# Patient Record
Sex: MALE | Race: WHITE | HISPANIC OR LATINO | Employment: OTHER | URBAN - METROPOLITAN AREA
[De-identification: names, ages, dates, MRNs, and addresses within clinical notes are randomized per-mention and may not be internally consistent; named-entity substitution may affect disease eponyms.]

---

## 2019-02-05 ENCOUNTER — OFFICE VISIT (OUTPATIENT)
Dept: URGENT CARE | Facility: CLINIC | Age: 69
End: 2019-02-05
Payer: MEDICARE

## 2019-02-05 VITALS
TEMPERATURE: 98 F | BODY MASS INDEX: 27.7 KG/M2 | HEIGHT: 69 IN | OXYGEN SATURATION: 95 % | DIASTOLIC BLOOD PRESSURE: 76 MMHG | SYSTOLIC BLOOD PRESSURE: 147 MMHG | HEART RATE: 68 BPM | RESPIRATION RATE: 18 BRPM | WEIGHT: 187 LBS

## 2019-02-05 DIAGNOSIS — J00 ACUTE NASOPHARYNGITIS: Primary | ICD-10-CM

## 2019-02-05 PROCEDURE — 99203 OFFICE O/P NEW LOW 30 MIN: CPT | Mod: S$GLB,,, | Performed by: NURSE PRACTITIONER

## 2019-02-05 PROCEDURE — 99203 PR OFFICE/OUTPT VISIT, NEW, LEVL III, 30-44 MIN: ICD-10-PCS | Mod: S$GLB,,, | Performed by: NURSE PRACTITIONER

## 2019-02-05 RX ORDER — LISINOPRIL 5 MG/1
5 TABLET ORAL DAILY
Status: ON HOLD | COMMUNITY
End: 2020-02-17

## 2019-02-05 RX ORDER — LEVOCETIRIZINE DIHYDROCHLORIDE 5 MG/1
5 TABLET, FILM COATED ORAL NIGHTLY
Qty: 10 TABLET | Refills: 0 | Status: ON HOLD | OUTPATIENT
Start: 2019-02-05 | End: 2020-02-17

## 2019-02-05 RX ORDER — METHYLPREDNISOLONE 4 MG/1
TABLET ORAL
Qty: 21 TABLET | Refills: 0 | Status: ON HOLD | OUTPATIENT
Start: 2019-02-05 | End: 2020-02-17

## 2019-02-05 RX ORDER — METOPROLOL SUCCINATE 50 MG/1
50 TABLET, EXTENDED RELEASE ORAL DAILY
COMMUNITY

## 2019-02-05 NOTE — PROGRESS NOTES
"Subjective:       Patient ID: Antonio Gross is a 68 y.o. male.    Vitals:  height is 5' 9" (1.753 m) and weight is 84.8 kg (187 lb). His temperature is 98 °F (36.7 °C). His blood pressure is 147/76 (abnormal) and his pulse is 68. His respiration is 18 and oxygen saturation is 95%.     Chief Complaint: Cough    Pt is here for a cough. He states that it started 7 days ago. He was in really cold weather in [place], and now is here where it is warm. He states that he has a hx of afib, he wants to rule out cough being caused to afib, or fluid in lungs. He denies any chest pain or palpitations. He solely complains of the cough. He has been taking his pulse and BP regularly and everything was normal. Accumulation of phlegm in the back of throat at nighttime. Pt took robitussin day and night medication for the past 3 days.       Cough   This is a new problem. The current episode started in the past 7 days. The problem has been unchanged. The cough is productive of sputum. Associated symptoms include postnasal drip. Pertinent negatives include no chills, ear congestion, ear pain, eye redness, fever, hemoptysis, myalgias, nasal congestion, rash, sore throat, shortness of breath or wheezing. Nothing aggravates the symptoms. He has tried OTC cough suppressant for the symptoms. There is no history of bronchitis, COPD or pneumonia.       Constitution: Negative for chills, sweating, fatigue and fever.   HENT: Positive for postnasal drip. Negative for ear pain, congestion, sinus pain, sinus pressure, sore throat and voice change.    Neck: Negative for painful lymph nodes.   Eyes: Negative for eye redness.   Respiratory: Positive for cough. Negative for chest tightness, sputum production, bloody sputum, COPD, shortness of breath, stridor, wheezing and asthma.    Gastrointestinal: Negative for nausea and vomiting.   Musculoskeletal: Negative for muscle ache.   Skin: Negative for rash.   Allergic/Immunologic: Negative " for seasonal allergies and asthma.   Hematologic/Lymphatic: Negative for swollen lymph nodes.       Objective:      Physical Exam   Constitutional: He is oriented to person, place, and time. He appears well-developed and well-nourished. He is cooperative.  Non-toxic appearance. He does not appear ill. No distress.   HENT:   Head: Normocephalic and atraumatic.   Right Ear: Hearing, tympanic membrane, external ear and ear canal normal.   Left Ear: Hearing, tympanic membrane, external ear and ear canal normal.   Nose: Rhinorrhea present. No mucosal edema or nasal deformity. No epistaxis. Right sinus exhibits no maxillary sinus tenderness and no frontal sinus tenderness. Left sinus exhibits no maxillary sinus tenderness and no frontal sinus tenderness.   Mouth/Throat: Uvula is midline, oropharynx is clear and moist and mucous membranes are normal. No trismus in the jaw. Normal dentition. No uvula swelling. No posterior oropharyngeal erythema.   PND   Eyes: Conjunctivae and lids are normal. No scleral icterus.   Sclera clear bilat   Neck: Trachea normal, full passive range of motion without pain and phonation normal. Neck supple.   Cardiovascular: Normal rate, regular rhythm, normal heart sounds, intact distal pulses and normal pulses.   Pulmonary/Chest: Effort normal and breath sounds normal. No stridor. No respiratory distress. He has no decreased breath sounds. He has no wheezes. He has no rhonchi. He has no rales.   Abdominal: Soft. Normal appearance and bowel sounds are normal. He exhibits no distension. There is no tenderness.   Musculoskeletal: Normal range of motion. He exhibits no edema or deformity.   Neurological: He is alert and oriented to person, place, and time. He exhibits normal muscle tone. Coordination normal.   Skin: Skin is warm, dry and intact. He is not diaphoretic. No pallor.   Psychiatric: He has a normal mood and affect. His speech is normal and behavior is normal. Judgment and thought content  normal. Cognition and memory are normal.   Nursing note and vitals reviewed.      Assessment:       1. Acute nasopharyngitis        Plan:         Acute nasopharyngitis    Other orders  -     methylPREDNISolone (MEDROL DOSEPACK) 4 mg tablet; use as directed  Dispense: 21 tablet; Refill: 0  -     levocetirizine (XYZAL) 5 MG tablet; Take 1 tablet (5 mg total) by mouth every evening. for 10 days  Dispense: 10 tablet; Refill: 0        Enfermedad Respiratoria Viral [Uri, Viral Respiratory Illness, Adult, No Abx]  Usted tiene madison enfermedad respiratoria de las vías superiores provocada por un virus. Esta enfermedad es contagiosa rohan los primeros días. Se transmite por el aire, por la tos o el estornudo de la persona afectada, o por contacto directo con zahra persona (si raymond toca a la persona enferma y después se toca los ojos, la nariz o la boca). La mayoría de las enfermedades virales mejoran en 7-10 días con reposo y simples fortino caseros; aunque, en ocasiones, la enfermedad puede durar varias semanas. Los antibióticos son ineficaces contra los virus, por lo que generalmente no se recetan para esta enfermedad.    Cuidados En [place]:  1) Si los síntomas son severos, descanse en guerrero casa rohan los primeros 2 ó 3 días. Cuando reanude derick actividades, evite cansarse demasiado.  2) Evite exponerse al humo de cigarrillo (suyo o de otras personas).  3) Puede usar Tylenol (acetaminofén) o ibuprofeno (Motrin o Advil) para controlar la fiebre o el dolor muscular y el dolor de guillaume. (La aspirina no debe usarse nunca en personas menores de 18 años enfermas con fiebre, ya que puede causar daños graves al hígado.)  4) Es posible que tenga poco apetito, por lo que madison dieta ligera es adecuada. Para evitar la deshidratación, asael entre 6 y 8 vasos de líquido cada día (agua, refrescos, jugos de fruta, té, sopa etc.). La abundancia de líquido ayuda a desprender las secreciones de la nariz y los pulmones.  5) Los medicamentos sin  receta para el resfriado no acortarán la duración de la enfermedad, eddi pueden ser útiles para aliviar los siguientes síntomas: tos (Robitussin MD); dolor de garganta (Chloraseptic en comprimidos o spray); congestión nasal y de los senos paranasales (Actifed, Sudafed o Chlortrimeton).  Dane madison VISITA DE CONTROL a guerrero médico, o según le indiquen, si no se siente mejor rohan la semana próxima.  Busque Prontamente Atención Médica  si algo de lo siguiente ocurre:  -- Tos con esputo de color (mucosidad) o con margarito.  -- Dolor en el pecho, falta de aire, silbidos o dificultad para respirar.  -- Dolor de guillaume justine, dolor en la nicole, el jamilah o los oídos.  -- Fiebre superior a los 100.4º F (38.0º C) rohan más de marnie días.  -- Incapacidad de tragar a causa del dolor de garganta.  Date Last Reviewed: 9/13/2015  © [id number] [employer]. [address] [place] [id number]. Todos los derechos reservados. Esta información no pretende sustituir la atención médica profesional. Sólo guerrero médico puede diagnosticar y tratar un problema de carlito.    Please drink plenty of fluids.  Please get plenty of rest.  Please return here or go to the Emergency Department for any concerns or worsening of condition.  If you were given a steroid shot in the clinic and have also been given a prescription for a steroid such as Prednisone or a Medrol Dose Pack, please begin taking them tomorrow.  If you do not have Hypertension or any history of palpitations, it is ok to take over the counter Sudafed or Mucinex D or Allegra-D or Claritin-D or Zyrtec-D.  If you do take one of the above, it is ok to combine that with plain over the counter Mucinex or Allegra or Claritin or Zyrtec.  If for example you are taking Zyrtec -D, you can combine that with Mucinex, but not Mucinex-D.  If you are taking Mucinex-D, you can combine that with plain Allegra or Claritin or Zyrtec.   If you do have Hypertension or palpitations, it is safe  to take Coricidin HBP for relief of sinus symptoms.  If not allergic, please take over the counter Tylenol (Acetaminophen) and/or Motrin (Ibuprofen) as directed for control of pain and/or fever.  Please follow up with your primary care doctor or specialist as needed.    If you  smoke, please stop smoking.

## 2019-02-05 NOTE — PATIENT INSTRUCTIONS
Enfermedad Respiratoria Viral [Uri, Viral Respiratory Illness, Adult, No Abx]  Usted tiene madison enfermedad respiratoria de las vías superiores provocada por un virus. Esta enfermedad es contagiosa rohan los primeros días. Se transmite por el aire, por la tos o el estornudo de la persona afectada, o por contacto directo con zahra persona (si raymond toca a la persona enferma y después se toca los ojos, la nariz o la boca). La mayoría de las enfermedades virales mejoran en 7-10 días con reposo y simples fortino caseros; aunque, en ocasiones, la enfermedad puede durar varias semanas. Los antibióticos son ineficaces contra los virus, por lo que generalmente no se recetan para esta enfermedad.    Cuidados En [place]:  1) Si los síntomas son severos, descanse en guerrero casa rohan los primeros 2 ó 3 días. Cuando reanude derick actividades, evite cansarse demasiado.  2) Evite exponerse al humo de cigarrillo (suyo o de otras personas).  3) Puede usar Tylenol (acetaminofén) o ibuprofeno (Motrin o Advil) para controlar la fiebre o el dolor muscular y el dolor de guillaume. (La aspirina no debe usarse nunca en personas menores de 18 años enfermas con fiebre, ya que puede causar daños graves al hígado.)  4) Es posible que tenga poco apetito, por lo que madison dieta ligera es adecuada. Para evitar la deshidratación, asael entre 6 y 8 vasos de líquido cada día (agua, refrescos, jugos de fruta, té, sopa etc.). La abundancia de líquido ayuda a desprender las secreciones de la nariz y los pulmones.  5) Los medicamentos sin receta para el resfriado no acortarán la duración de la enfermedad, eddi pueden ser útiles para aliviar los siguientes síntomas: tos (Robitussin MD); dolor de garganta (Chloraseptic en comprimidos o spray); congestión nasal y de los senos paranasales (Actifed, Sudafed o Chlortrimeton).  Dane madison VISITA DE CONTROL a guerrero médico, o según le indiquen, si no se siente mejor rohan la semana próxima.  Busque Prontamente Atención  Médica  si algo de lo siguiente ocurre:  -- Tos con esputo de color (mucosidad) o con margarito.  -- Dolor en el pecho, falta de aire, silbidos o dificultad para respirar.  -- Dolor de guillaume justine, dolor en la nicole, el jamilah o los oídos.  -- Fiebre superior a los 100.4º F (38.0º C) rohan más de marnie días.  -- Incapacidad de tragar a causa del dolor de garganta.  Date Last Reviewed: 9/13/2015  © 5448-3217 [employer]. [address] [place] [address], [place] [address]. Todos los derechos reservados. Esta información no pretende sustituir la atención médica profesional. Sólo guerrero médico puede diagnosticar y tratar un problema de carlito.    Please drink plenty of fluids.  Please get plenty of rest.  Please return here or go to the Emergency Department for any concerns or worsening of condition.  If you were given a steroid shot in the clinic and have also been given a prescription for a steroid such as Prednisone or a Medrol Dose Pack, please begin taking them tomorrow.  If you do not have Hypertension or any history of palpitations, it is ok to take over the counter Sudafed or Mucinex D or Allegra-D or Claritin-D or Zyrtec-D.  If you do take one of the above, it is ok to combine that with plain over the counter Mucinex or Allegra or Claritin or Zyrtec.  If for example you are taking Zyrtec -D, you can combine that with Mucinex, but not Mucinex-D.  If you are taking Mucinex-D, you can combine that with plain Allegra or Claritin or Zyrtec.   If you do have Hypertension or palpitations, it is safe to take Coricidin HBP for relief of sinus symptoms.  If not allergic, please take over the counter Tylenol (Acetaminophen) and/or Motrin (Ibuprofen) as directed for control of pain and/or fever.  Please follow up with your primary care doctor or specialist as needed.    If you  smoke, please stop smoking.

## 2020-02-15 ENCOUNTER — ANESTHESIA (OUTPATIENT)
Dept: SURGERY | Facility: HOSPITAL | Age: 70
DRG: 854 | End: 2020-02-15
Payer: MEDICARE

## 2020-02-15 ENCOUNTER — HOSPITAL ENCOUNTER (INPATIENT)
Facility: HOSPITAL | Age: 70
LOS: 7 days | Discharge: HOME HEALTH | DRG: 854 | End: 2020-02-22
Attending: EMERGENCY MEDICINE | Admitting: INTERNAL MEDICINE
Payer: MEDICARE

## 2020-02-15 ENCOUNTER — ANESTHESIA EVENT (OUTPATIENT)
Dept: SURGERY | Facility: HOSPITAL | Age: 70
DRG: 854 | End: 2020-02-15
Payer: MEDICARE

## 2020-02-15 DIAGNOSIS — R78.81 BACTEREMIA: ICD-10-CM

## 2020-02-15 DIAGNOSIS — A41.01 SEPSIS DUE TO METHICILLIN SUSCEPTIBLE STAPHYLOCOCCUS AUREUS, UNSPECIFIED WHETHER ACUTE ORGAN DYSFUNCTION PRESENT: ICD-10-CM

## 2020-02-15 DIAGNOSIS — R93.2 ABNORMAL FINDING ON IMAGING OF LIVER: ICD-10-CM

## 2020-02-15 DIAGNOSIS — R50.9 FEVER: ICD-10-CM

## 2020-02-15 DIAGNOSIS — N20.1 URETERAL STONE: ICD-10-CM

## 2020-02-15 DIAGNOSIS — N20.0 NEPHROLITHIASIS: ICD-10-CM

## 2020-02-15 DIAGNOSIS — B95.8 BACTEREMIA DUE TO STAPHYLOCOCCUS: Primary | ICD-10-CM

## 2020-02-15 DIAGNOSIS — R78.81 BACTEREMIA DUE TO STAPHYLOCOCCUS: Primary | ICD-10-CM

## 2020-02-15 DIAGNOSIS — A41.9 SEPSIS, DUE TO UNSPECIFIED ORGANISM, UNSPECIFIED WHETHER ACUTE ORGAN DYSFUNCTION PRESENT: ICD-10-CM

## 2020-02-15 DIAGNOSIS — N20.1 RIGHT URETERAL STONE: ICD-10-CM

## 2020-02-15 DIAGNOSIS — N12 PYELONEPHRITIS: ICD-10-CM

## 2020-02-15 DIAGNOSIS — R07.9 CHEST PAIN: ICD-10-CM

## 2020-02-15 PROBLEM — Z79.01 CURRENT USE OF LONG TERM ANTICOAGULATION: Status: ACTIVE | Noted: 2020-02-15

## 2020-02-15 PROBLEM — I48.91 ATRIAL FIBRILLATION: Status: ACTIVE | Noted: 2020-02-15

## 2020-02-15 PROBLEM — I10 ESSENTIAL HYPERTENSION: Status: ACTIVE | Noted: 2020-02-15

## 2020-02-15 LAB
ABO GROUP BLD: NORMAL
ALBUMIN SERPL BCP-MCNC: 3.1 G/DL (ref 3.5–5.2)
ALP SERPL-CCNC: 70 U/L (ref 55–135)
ALT SERPL W/O P-5'-P-CCNC: 40 U/L (ref 10–44)
ANION GAP SERPL CALC-SCNC: 9 MMOL/L (ref 8–16)
AST SERPL-CCNC: 33 U/L (ref 10–40)
BACTERIA #/AREA URNS AUTO: ABNORMAL /HPF
BASOPHILS # BLD AUTO: 0.03 K/UL (ref 0–0.2)
BASOPHILS NFR BLD: 0.2 % (ref 0–1.9)
BILIRUB SERPL-MCNC: 2.2 MG/DL (ref 0.1–1)
BILIRUB UR QL STRIP: NEGATIVE
BLD GP AB SCN CELLS X3 SERPL QL: NORMAL
BUN SERPL-MCNC: 18 MG/DL (ref 8–23)
CALCIUM SERPL-MCNC: 9.3 MG/DL (ref 8.7–10.5)
CHLORIDE SERPL-SCNC: 97 MMOL/L (ref 95–110)
CLARITY UR REFRACT.AUTO: ABNORMAL
CO2 SERPL-SCNC: 22 MMOL/L (ref 23–29)
COLOR UR AUTO: ABNORMAL
CREAT SERPL-MCNC: 1.4 MG/DL (ref 0.5–1.4)
DIFFERENTIAL METHOD: ABNORMAL
EOSINOPHIL # BLD AUTO: 0 K/UL (ref 0–0.5)
EOSINOPHIL NFR BLD: 0 % (ref 0–8)
ERYTHROCYTE [DISTWIDTH] IN BLOOD BY AUTOMATED COUNT: 13 % (ref 11.5–14.5)
EST. GFR  (AFRICAN AMERICAN): 58 ML/MIN/1.73 M^2
EST. GFR  (NON AFRICAN AMERICAN): 50.2 ML/MIN/1.73 M^2
GLUCOSE SERPL-MCNC: 115 MG/DL (ref 70–110)
GLUCOSE UR QL STRIP: NEGATIVE
HCT VFR BLD AUTO: 37.6 % (ref 40–54)
HGB BLD-MCNC: 13.1 G/DL (ref 14–18)
HGB UR QL STRIP: ABNORMAL
HYALINE CASTS UR QL AUTO: 0 /LPF
IMM GRANULOCYTES # BLD AUTO: 0.66 K/UL (ref 0–0.04)
IMM GRANULOCYTES NFR BLD AUTO: 3.5 % (ref 0–0.5)
INFLUENZA A, MOLECULAR: NEGATIVE
INFLUENZA B, MOLECULAR: NEGATIVE
KETONES UR QL STRIP: NEGATIVE
LACTATE SERPL-SCNC: 1.1 MMOL/L (ref 0.5–2.2)
LACTATE SERPL-SCNC: 1.7 MMOL/L (ref 0.5–2.2)
LEUKOCYTE ESTERASE UR QL STRIP: ABNORMAL
LYMPHOCYTES # BLD AUTO: 0.5 K/UL (ref 1–4.8)
LYMPHOCYTES NFR BLD: 2.7 % (ref 18–48)
MAGNESIUM SERPL-MCNC: 1.5 MG/DL (ref 1.6–2.6)
MCH RBC QN AUTO: 34.7 PG (ref 27–31)
MCHC RBC AUTO-ENTMCNC: 34.8 G/DL (ref 32–36)
MCV RBC AUTO: 100 FL (ref 82–98)
MICROSCOPIC COMMENT: ABNORMAL
MONOCYTES # BLD AUTO: 1.7 K/UL (ref 0.3–1)
MONOCYTES NFR BLD: 8.8 % (ref 4–15)
NEUTROPHILS # BLD AUTO: 15.9 K/UL (ref 1.8–7.7)
NEUTROPHILS NFR BLD: 84.8 % (ref 38–73)
NITRITE UR QL STRIP: NEGATIVE
NRBC BLD-RTO: 0 /100 WBC
PH UR STRIP: 5 [PH] (ref 5–8)
PHOSPHATE SERPL-MCNC: 2.6 MG/DL (ref 2.7–4.5)
PLATELET # BLD AUTO: 100 K/UL (ref 150–350)
PMV BLD AUTO: 10.3 FL (ref 9.2–12.9)
POTASSIUM SERPL-SCNC: 4.2 MMOL/L (ref 3.5–5.1)
PROCALCITONIN SERPL IA-MCNC: 2.77 NG/ML
PROT SERPL-MCNC: 7.1 G/DL (ref 6–8.4)
PROT UR QL STRIP: ABNORMAL
RBC # BLD AUTO: 3.77 M/UL (ref 4.6–6.2)
RBC #/AREA URNS AUTO: 6 /HPF (ref 0–4)
RH BLD: NORMAL
SODIUM SERPL-SCNC: 128 MMOL/L (ref 136–145)
SP GR UR STRIP: 1.02 (ref 1–1.03)
SPECIMEN SOURCE: NORMAL
URN SPEC COLLECT METH UR: ABNORMAL
WBC # BLD AUTO: 18.76 K/UL (ref 3.9–12.7)
WBC #/AREA URNS AUTO: >100 /HPF (ref 0–5)

## 2020-02-15 PROCEDURE — 87186 SC STD MICRODIL/AGAR DIL: CPT | Mod: 59

## 2020-02-15 PROCEDURE — 25000003 PHARM REV CODE 250: Performed by: STUDENT IN AN ORGANIZED HEALTH CARE EDUCATION/TRAINING PROGRAM

## 2020-02-15 PROCEDURE — D9220A PRA ANESTHESIA: Mod: ANES,,, | Performed by: ANESTHESIOLOGY

## 2020-02-15 PROCEDURE — 87502 INFLUENZA DNA AMP PROBE: CPT

## 2020-02-15 PROCEDURE — 11000001 HC ACUTE MED/SURG PRIVATE ROOM

## 2020-02-15 PROCEDURE — 99291 CRITICAL CARE FIRST HOUR: CPT | Mod: ,,, | Performed by: EMERGENCY MEDICINE

## 2020-02-15 PROCEDURE — 52332 CYSTOSCOPY AND TREATMENT: CPT | Mod: RT,,, | Performed by: UROLOGY

## 2020-02-15 PROCEDURE — C2617 STENT, NON-COR, TEM W/O DEL: HCPCS | Performed by: UROLOGY

## 2020-02-15 PROCEDURE — 87040 BLOOD CULTURE FOR BACTERIA: CPT | Mod: 59

## 2020-02-15 PROCEDURE — 83605 ASSAY OF LACTIC ACID: CPT

## 2020-02-15 PROCEDURE — S0028 INJECTION, FAMOTIDINE, 20 MG: HCPCS | Performed by: NURSE ANESTHETIST, CERTIFIED REGISTERED

## 2020-02-15 PROCEDURE — 84145 PROCALCITONIN (PCT): CPT

## 2020-02-15 PROCEDURE — 87086 URINE CULTURE/COLONY COUNT: CPT

## 2020-02-15 PROCEDURE — 86850 RBC ANTIBODY SCREEN: CPT

## 2020-02-15 PROCEDURE — 63600175 PHARM REV CODE 636 W HCPCS: Performed by: NURSE ANESTHETIST, CERTIFIED REGISTERED

## 2020-02-15 PROCEDURE — 36000706: Performed by: UROLOGY

## 2020-02-15 PROCEDURE — 52332 PR CYSTOSCOPY,INSERT URETERAL STENT: ICD-10-PCS | Mod: RT,,, | Performed by: UROLOGY

## 2020-02-15 PROCEDURE — 63600175 PHARM REV CODE 636 W HCPCS: Performed by: INTERNAL MEDICINE

## 2020-02-15 PROCEDURE — 86900 BLOOD TYPING SEROLOGIC ABO: CPT

## 2020-02-15 PROCEDURE — 25000003 PHARM REV CODE 250: Performed by: HOSPITALIST

## 2020-02-15 PROCEDURE — D9220A PRA ANESTHESIA: ICD-10-PCS | Mod: ANES,,, | Performed by: ANESTHESIOLOGY

## 2020-02-15 PROCEDURE — C1769 GUIDE WIRE: HCPCS | Performed by: UROLOGY

## 2020-02-15 PROCEDURE — 87086 URINE CULTURE/COLONY COUNT: CPT | Mod: 59

## 2020-02-15 PROCEDURE — C1758 CATHETER, URETERAL: HCPCS | Performed by: UROLOGY

## 2020-02-15 PROCEDURE — 87077 CULTURE AEROBIC IDENTIFY: CPT | Mod: 59

## 2020-02-15 PROCEDURE — 80053 COMPREHEN METABOLIC PANEL: CPT

## 2020-02-15 PROCEDURE — 63600175 PHARM REV CODE 636 W HCPCS: Performed by: EMERGENCY MEDICINE

## 2020-02-15 PROCEDURE — D9220A PRA ANESTHESIA: Mod: CRNA,,, | Performed by: NURSE ANESTHETIST, CERTIFIED REGISTERED

## 2020-02-15 PROCEDURE — 93005 ELECTROCARDIOGRAM TRACING: CPT

## 2020-02-15 PROCEDURE — 36000707: Performed by: UROLOGY

## 2020-02-15 PROCEDURE — 25000003 PHARM REV CODE 250: Performed by: NURSE ANESTHETIST, CERTIFIED REGISTERED

## 2020-02-15 PROCEDURE — 71000015 HC POSTOP RECOV 1ST HR: Performed by: UROLOGY

## 2020-02-15 PROCEDURE — 93005 ELECTROCARDIOGRAM TRACING: CPT | Performed by: INTERNAL MEDICINE

## 2020-02-15 PROCEDURE — 37000009 HC ANESTHESIA EA ADD 15 MINS: Performed by: UROLOGY

## 2020-02-15 PROCEDURE — 63600175 PHARM REV CODE 636 W HCPCS: Performed by: STUDENT IN AN ORGANIZED HEALTH CARE EDUCATION/TRAINING PROGRAM

## 2020-02-15 PROCEDURE — 84100 ASSAY OF PHOSPHORUS: CPT

## 2020-02-15 PROCEDURE — 25500020 PHARM REV CODE 255: Performed by: EMERGENCY MEDICINE

## 2020-02-15 PROCEDURE — 96375 TX/PRO/DX INJ NEW DRUG ADDON: CPT

## 2020-02-15 PROCEDURE — D9220A PRA ANESTHESIA: ICD-10-PCS | Mod: CRNA,,, | Performed by: NURSE ANESTHETIST, CERTIFIED REGISTERED

## 2020-02-15 PROCEDURE — 71000033 HC RECOVERY, INTIAL HOUR: Performed by: UROLOGY

## 2020-02-15 PROCEDURE — 99291 CRITICAL CARE FIRST HOUR: CPT | Mod: 25

## 2020-02-15 PROCEDURE — 99223 PR INITIAL HOSPITAL CARE,LEVL III: ICD-10-PCS | Mod: AI,,, | Performed by: INTERNAL MEDICINE

## 2020-02-15 PROCEDURE — 85025 COMPLETE CBC W/AUTO DIFF WBC: CPT

## 2020-02-15 PROCEDURE — 83735 ASSAY OF MAGNESIUM: CPT

## 2020-02-15 PROCEDURE — 93010 EKG 12-LEAD: ICD-10-PCS | Mod: ,,, | Performed by: INTERNAL MEDICINE

## 2020-02-15 PROCEDURE — 96374 THER/PROPH/DIAG INJ IV PUSH: CPT | Mod: 59

## 2020-02-15 PROCEDURE — 93010 ELECTROCARDIOGRAM REPORT: CPT | Mod: ,,, | Performed by: INTERNAL MEDICINE

## 2020-02-15 PROCEDURE — 99291 PR CRITICAL CARE, E/M 30-74 MINUTES: ICD-10-PCS | Mod: ,,, | Performed by: EMERGENCY MEDICINE

## 2020-02-15 PROCEDURE — 25500020 PHARM REV CODE 255: Performed by: UROLOGY

## 2020-02-15 PROCEDURE — 99223 1ST HOSP IP/OBS HIGH 75: CPT | Mod: AI,,, | Performed by: INTERNAL MEDICINE

## 2020-02-15 PROCEDURE — 81001 URINALYSIS AUTO W/SCOPE: CPT

## 2020-02-15 PROCEDURE — 86901 BLOOD TYPING SEROLOGIC RH(D): CPT

## 2020-02-15 PROCEDURE — 37000008 HC ANESTHESIA 1ST 15 MINUTES: Performed by: UROLOGY

## 2020-02-15 PROCEDURE — 87088 URINE BACTERIA CULTURE: CPT

## 2020-02-15 PROCEDURE — 25000003 PHARM REV CODE 250: Performed by: EMERGENCY MEDICINE

## 2020-02-15 DEVICE — STENT URETERAL UNIV 6FR 26CM: Type: IMPLANTABLE DEVICE | Site: URETER | Status: FUNCTIONAL

## 2020-02-15 RX ORDER — ACETAMINOPHEN 325 MG/1
650 TABLET ORAL EVERY 4 HOURS PRN
Status: DISCONTINUED | OUTPATIENT
Start: 2020-02-15 | End: 2020-02-23 | Stop reason: HOSPADM

## 2020-02-15 RX ORDER — SODIUM CHLORIDE 9 MG/ML
INJECTION, SOLUTION INTRAVENOUS CONTINUOUS
Status: DISCONTINUED | OUTPATIENT
Start: 2020-02-15 | End: 2020-02-17

## 2020-02-15 RX ORDER — SODIUM CHLORIDE 0.9 % (FLUSH) 0.9 %
10 SYRINGE (ML) INJECTION
Status: DISCONTINUED | OUTPATIENT
Start: 2020-02-15 | End: 2020-02-23 | Stop reason: HOSPADM

## 2020-02-15 RX ORDER — LIDOCAINE HYDROCHLORIDE 20 MG/ML
INJECTION INTRAVENOUS
Status: DISCONTINUED | OUTPATIENT
Start: 2020-02-15 | End: 2020-02-15

## 2020-02-15 RX ORDER — ROCURONIUM BROMIDE 10 MG/ML
INJECTION, SOLUTION INTRAVENOUS
Status: DISCONTINUED | OUTPATIENT
Start: 2020-02-15 | End: 2020-02-15

## 2020-02-15 RX ORDER — AMIODARONE HYDROCHLORIDE 100 MG/1
100 TABLET ORAL DAILY
Status: DISCONTINUED | OUTPATIENT
Start: 2020-02-16 | End: 2020-02-16

## 2020-02-15 RX ORDER — MORPHINE SULFATE 2 MG/ML
2 INJECTION, SOLUTION INTRAMUSCULAR; INTRAVENOUS EVERY 4 HOURS PRN
Status: DISCONTINUED | OUTPATIENT
Start: 2020-02-15 | End: 2020-02-23 | Stop reason: HOSPADM

## 2020-02-15 RX ORDER — OXYBUTYNIN CHLORIDE 5 MG/1
5 TABLET ORAL 3 TIMES DAILY PRN
Status: DISCONTINUED | OUTPATIENT
Start: 2020-02-15 | End: 2020-02-23 | Stop reason: HOSPADM

## 2020-02-15 RX ORDER — ONDANSETRON 2 MG/ML
4 INJECTION INTRAMUSCULAR; INTRAVENOUS DAILY PRN
Status: CANCELLED | OUTPATIENT
Start: 2020-02-15

## 2020-02-15 RX ORDER — FENTANYL CITRATE 50 UG/ML
25 INJECTION, SOLUTION INTRAMUSCULAR; INTRAVENOUS EVERY 5 MIN PRN
Status: CANCELLED | OUTPATIENT
Start: 2020-02-15

## 2020-02-15 RX ORDER — EPHEDRINE SULFATE 50 MG/ML
INJECTION, SOLUTION INTRAVENOUS
Status: DISCONTINUED | OUTPATIENT
Start: 2020-02-15 | End: 2020-02-15

## 2020-02-15 RX ORDER — TAMSULOSIN HYDROCHLORIDE 0.4 MG/1
0.4 CAPSULE ORAL DAILY
Status: DISCONTINUED | OUTPATIENT
Start: 2020-02-15 | End: 2020-02-23 | Stop reason: HOSPADM

## 2020-02-15 RX ORDER — PROPOFOL 10 MG/ML
VIAL (ML) INTRAVENOUS
Status: DISCONTINUED | OUTPATIENT
Start: 2020-02-15 | End: 2020-02-15

## 2020-02-15 RX ORDER — ONDANSETRON 2 MG/ML
INJECTION INTRAMUSCULAR; INTRAVENOUS
Status: DISCONTINUED | OUTPATIENT
Start: 2020-02-15 | End: 2020-02-15

## 2020-02-15 RX ORDER — ACETAMINOPHEN 500 MG
1000 TABLET ORAL
Status: COMPLETED | OUTPATIENT
Start: 2020-02-15 | End: 2020-02-15

## 2020-02-15 RX ORDER — DEXAMETHASONE SODIUM PHOSPHATE 4 MG/ML
INJECTION, SOLUTION INTRA-ARTICULAR; INTRALESIONAL; INTRAMUSCULAR; INTRAVENOUS; SOFT TISSUE
Status: DISCONTINUED | OUTPATIENT
Start: 2020-02-15 | End: 2020-02-15

## 2020-02-15 RX ORDER — AMOXICILLIN 250 MG
1 CAPSULE ORAL DAILY PRN
Status: DISCONTINUED | OUTPATIENT
Start: 2020-02-15 | End: 2020-02-23 | Stop reason: HOSPADM

## 2020-02-15 RX ORDER — ONDANSETRON 2 MG/ML
4 INJECTION INTRAMUSCULAR; INTRAVENOUS EVERY 8 HOURS PRN
Status: DISCONTINUED | OUTPATIENT
Start: 2020-02-15 | End: 2020-02-23 | Stop reason: HOSPADM

## 2020-02-15 RX ORDER — IBUPROFEN 200 MG
24 TABLET ORAL
Status: DISCONTINUED | OUTPATIENT
Start: 2020-02-15 | End: 2020-02-23 | Stop reason: HOSPADM

## 2020-02-15 RX ORDER — PHENYLEPHRINE HYDROCHLORIDE 10 MG/ML
INJECTION INTRAVENOUS
Status: DISCONTINUED | OUTPATIENT
Start: 2020-02-15 | End: 2020-02-15

## 2020-02-15 RX ORDER — SUCCINYLCHOLINE CHLORIDE 20 MG/ML
INJECTION INTRAMUSCULAR; INTRAVENOUS
Status: DISCONTINUED | OUTPATIENT
Start: 2020-02-15 | End: 2020-02-15

## 2020-02-15 RX ORDER — GLUCAGON 1 MG
1 KIT INJECTION
Status: DISCONTINUED | OUTPATIENT
Start: 2020-02-15 | End: 2020-02-23 | Stop reason: HOSPADM

## 2020-02-15 RX ORDER — FAMOTIDINE 10 MG/ML
INJECTION INTRAVENOUS
Status: DISCONTINUED | OUTPATIENT
Start: 2020-02-15 | End: 2020-02-15

## 2020-02-15 RX ORDER — FENTANYL CITRATE 50 UG/ML
INJECTION, SOLUTION INTRAMUSCULAR; INTRAVENOUS
Status: DISCONTINUED | OUTPATIENT
Start: 2020-02-15 | End: 2020-02-15

## 2020-02-15 RX ORDER — IBUPROFEN 200 MG
16 TABLET ORAL
Status: DISCONTINUED | OUTPATIENT
Start: 2020-02-15 | End: 2020-02-23 | Stop reason: HOSPADM

## 2020-02-15 RX ORDER — MAGNESIUM SULFATE HEPTAHYDRATE 40 MG/ML
2 INJECTION, SOLUTION INTRAVENOUS
Status: COMPLETED | OUTPATIENT
Start: 2020-02-15 | End: 2020-02-15

## 2020-02-15 RX ORDER — OXYCODONE HYDROCHLORIDE 5 MG/1
5 TABLET ORAL EVERY 6 HOURS PRN
Status: DISCONTINUED | OUTPATIENT
Start: 2020-02-15 | End: 2020-02-23 | Stop reason: HOSPADM

## 2020-02-15 RX ORDER — ACETAMINOPHEN 10 MG/ML
1000 INJECTION, SOLUTION INTRAVENOUS ONCE
Status: COMPLETED | OUTPATIENT
Start: 2020-02-15 | End: 2020-02-15

## 2020-02-15 RX ORDER — ONDANSETRON 2 MG/ML
4 INJECTION INTRAMUSCULAR; INTRAVENOUS ONCE AS NEEDED
Status: DISCONTINUED | OUTPATIENT
Start: 2020-02-15 | End: 2020-02-23 | Stop reason: HOSPADM

## 2020-02-15 RX ORDER — CETIRIZINE HYDROCHLORIDE 5 MG/1
5 TABLET ORAL DAILY
Status: DISCONTINUED | OUTPATIENT
Start: 2020-02-16 | End: 2020-02-23 | Stop reason: HOSPADM

## 2020-02-15 RX ADMIN — PHENYLEPHRINE HYDROCHLORIDE 100 MCG: 10 INJECTION INTRAVENOUS at 05:02

## 2020-02-15 RX ADMIN — APIXABAN 5 MG: 5 TABLET, FILM COATED ORAL at 10:02

## 2020-02-15 RX ADMIN — ACETAMINOPHEN 1000 MG: 500 TABLET ORAL at 09:02

## 2020-02-15 RX ADMIN — FENTANYL CITRATE 50 MCG: 50 INJECTION, SOLUTION INTRAMUSCULAR; INTRAVENOUS at 04:02

## 2020-02-15 RX ADMIN — ACETAMINOPHEN 1000 MG: 10 INJECTION, SOLUTION INTRAVENOUS at 06:02

## 2020-02-15 RX ADMIN — FAMOTIDINE 20 MG: 10 INJECTION, SOLUTION INTRAVENOUS at 05:02

## 2020-02-15 RX ADMIN — SODIUM CHLORIDE 100 ML/HR: 0.9 INJECTION, SOLUTION INTRAVENOUS at 06:02

## 2020-02-15 RX ADMIN — EPHEDRINE SULFATE 10 MG: 50 INJECTION, SOLUTION INTRAMUSCULAR; INTRAVENOUS; SUBCUTANEOUS at 05:02

## 2020-02-15 RX ADMIN — IOHEXOL 100 ML: 350 INJECTION, SOLUTION INTRAVENOUS at 01:02

## 2020-02-15 RX ADMIN — MAGNESIUM SULFATE HEPTAHYDRATE 2 G: 40 INJECTION, SOLUTION INTRAVENOUS at 09:02

## 2020-02-15 RX ADMIN — ROCURONIUM BROMIDE 10 MG: 10 INJECTION, SOLUTION INTRAVENOUS at 04:02

## 2020-02-15 RX ADMIN — SODIUM CHLORIDE, SODIUM GLUCONATE, SODIUM ACETATE, POTASSIUM CHLORIDE, MAGNESIUM CHLORIDE, SODIUM PHOSPHATE, DIBASIC, AND POTASSIUM PHOSPHATE: .53; .5; .37; .037; .03; .012; .00082 INJECTION, SOLUTION INTRAVENOUS at 04:02

## 2020-02-15 RX ADMIN — DEXAMETHASONE SODIUM PHOSPHATE 8 MG: 4 INJECTION, SOLUTION INTRAMUSCULAR; INTRAVENOUS at 05:02

## 2020-02-15 RX ADMIN — PIPERACILLIN SODIUM AND TAZOBACTAM SODIUM 4.5 G: 4; .5 INJECTION, POWDER, LYOPHILIZED, FOR SOLUTION INTRAVENOUS at 08:02

## 2020-02-15 RX ADMIN — PIPERACILLIN SODIUM AND TAZOBACTAM SODIUM 4.5 G: 4; .5 INJECTION, POWDER, LYOPHILIZED, FOR SOLUTION INTRAVENOUS at 11:02

## 2020-02-15 RX ADMIN — LIDOCAINE HYDROCHLORIDE 100 MG: 20 INJECTION, SOLUTION INTRAVENOUS at 04:02

## 2020-02-15 RX ADMIN — SUCCINYLCHOLINE CHLORIDE 120 MG: 20 INJECTION, SOLUTION INTRAMUSCULAR; INTRAVENOUS at 04:02

## 2020-02-15 RX ADMIN — TAMSULOSIN HYDROCHLORIDE 0.4 MG: 0.4 CAPSULE ORAL at 06:02

## 2020-02-15 RX ADMIN — SODIUM CHLORIDE 2586 ML: 0.9 INJECTION, SOLUTION INTRAVENOUS at 09:02

## 2020-02-15 RX ADMIN — ONDANSETRON 4 MG: 2 INJECTION INTRAMUSCULAR; INTRAVENOUS at 05:02

## 2020-02-15 RX ADMIN — PROPOFOL 100 MG: 10 INJECTION, EMULSION INTRAVENOUS at 04:02

## 2020-02-15 NOTE — ASSESSMENT & PLAN NOTE
69 y.o. male with a history of HTN, afib (on Eliquis), prostate cancer s/p RRP in 2013 who presents to the [facility] ED on 2/15/20 due to right pyelonephritis in the setting of a 6.3 mm right proximal ureteral stone with proximal hydronephrosis.    - To OR for class A right ureteral stent placement  - Consent obtained. I have explained the indications, risks, benefits, and alternatives of the procedure in detail. The patient voices understanding and all questions have been answered. The patient agrees to proceed as planned. Patient's first language is Yoruba but he also speaks English. I offered a Yoruba  and he declined.  - NPO. Last meal at 6 am and had a cup of ice at 11 am.  - Will defer work-up for right adrenal nodule as an outpatient  - Recommend admission to medicine vs. MICU given medical comorbidities.

## 2020-02-15 NOTE — SUBJECTIVE & OBJECTIVE
Past Medical History:   Diagnosis Date    A-fib     Hypertension        Past Surgical History:   Procedure Laterality Date    PROSTATECTOMY         Review of patient's allergies indicates:  No Known Allergies    Family History     Problem Relation (Age of Onset)    No Known Problems Mother, Father          Tobacco Use    Smoking status: Never Smoker    Smokeless tobacco: Never Used   Substance and Sexual Activity    Alcohol use: Yes    Drug use: No    Sexual activity: Not on file       Review of Systems   Constitutional: Positive for appetite change, chills and fever.   HENT: Negative.    Eyes: Negative.    Respiratory: Negative for chest tightness and shortness of breath.    Cardiovascular: Negative for chest pain and palpitations.   Gastrointestinal: Positive for abdominal pain and nausea. Negative for vomiting.   Genitourinary: Positive for flank pain and hematuria. Negative for dysuria.   Musculoskeletal: Negative for arthralgias and gait problem.   Skin: Negative for color change and rash.   Neurological: Negative for dizziness and headaches.   Psychiatric/Behavioral: Negative for agitation and confusion.       Objective:     Temp:  [99 °F (37.2 °C)-102.5 °F (39.2 °C)] 99 °F (37.2 °C)  Pulse:  [54-71] 67  Resp:  [18-26] 25  SpO2:  [94 %-99 %] 95 %  BP: (102-124)/(53-77) 124/61     Body mass index is 23.87 kg/m².           Drains     None                 Physical Exam   Nursing note and vitals reviewed.  Constitutional: He is oriented to person, place, and time. He appears well-developed and well-nourished. No distress.   HENT:   Head: Normocephalic and atraumatic.   Eyes: Pupils are equal, round, and reactive to light. Right eye exhibits no discharge. Left eye exhibits no discharge.   Cardiovascular: Normal rate.    Pulmonary/Chest: Effort normal. No respiratory distress.   Abdominal: Soft. He exhibits no distension. There is no tenderness.   R CVA tenderness   Genitourinary:   Genitourinary Comments:  Penis circumcised   Neurological: He is alert and oriented to person, place, and time.   Skin: Skin is warm. He is diaphoretic.     Psychiatric: He has a normal mood and affect. His behavior is normal. Judgment and thought content normal.       Significant Labs:    BMP:  Recent Labs   Lab 02/15/20  0919   *   K 4.2   CL 97   CO2 22*   BUN 18   CREATININE 1.4   CALCIUM 9.3       CBC:  Recent Labs   Lab 02/15/20  0919   WBC 18.76*   HGB 13.1*   HCT 37.6*   *       All pertinent labs results from the past 24 hours have been reviewed.    Significant Imaging:  CT Abdomen/Pelvis with Contrast 2/15/20  - 1.3 cm right adrenal nodule, left adrenal unremarkable  - No left-sided stones, masses, or hydronephrosis  - Left ureter normal in course and caliber  - Mild right hydronephrosis; no right renal stones or masses  - 5.3x6.3 mm right proximal ureteral stone  - Bladder unremarkable  - Surgical clips in pelvis  - Prostate surgically absent

## 2020-02-15 NOTE — H&P (VIEW-ONLY)
[facility]  Urology  Consult Note    Patient Name: Antonio Gross  MRN: [id number]  Admission Date: 2/15/2020  Hospital Length of Stay: 0   Code Status: No Order   Attending Provider: Keri Mauricio MD   Consulting Provider: Freddie Winters MD  Primary Care Physician: Primary Doctor No  Principal Problem:Right ureteral stone    Inpatient consult to Urology  Consult performed by: Freddie Winters MD  Consult ordered by: Keri Mauricio MD          Subjective:     HPI:  69 y.o. male with a history of HTN, afib (on Eliquis), prostate cancer s/p RRP in 2013 who [place] [facility] [place]15/20 due to right pyelonephritis in the setting of a 6.3 mm right proximal ureteral stone with proximal hydronephrosis. Patient has had weakness and constant right flank pain which began 3 days ago. He had an episode of diarrhea 3 days ago and has had poor appetite since. He has been nauseated but has had no vomiting. He denies dysuria but endorses gross hematuria. No suprapubic pain. Denies UTI or stone history.    In the ED, he was febrile to 102.5. He has not been tachycardic or hypotensive. Lactate is normal. His WBC is elevated to 18.8 and his Cr is 1.4 (no baseline on file). Clean catch UA was nitrite negative and showed 6 RBC/hpf, >100 WBC/hpf, and occasional bacteria. He has received 4.5 g of Zosyn. CT abdomen/pelvis with contrast demonstrated mild right hydronephrosis secondary to a 5.3.x6.3 mm right proximal ureteral stone. He has a delayed nephrogram on the right. In addition, there is a 1.3 cm right adrenal nodule.    Past Medical History:   Diagnosis Date    A-fib     Hypertension        Past Surgical History:   Procedure Laterality Date    PROSTATECTOMY         Review of patient's allergies indicates:  No Known Allergies    Family History     Problem Relation (Age of Onset)    No Known Problems Mother, Father          Tobacco Use    Smoking status: Never Smoker    Smokeless tobacco:  Never Used   Substance and Sexual Activity    Alcohol use: Yes    Drug use: No    Sexual activity: Not on file       Review of Systems   Constitutional: Positive for appetite change, chills and fever.   HENT: Negative.    Eyes: Negative.    Respiratory: Negative for chest tightness and shortness of breath.    Cardiovascular: Negative for chest pain and palpitations.   Gastrointestinal: Positive for abdominal pain and nausea. Negative for vomiting.   Genitourinary: Positive for flank pain and hematuria. Negative for dysuria.   Musculoskeletal: Negative for arthralgias and gait problem.   Skin: Negative for color change and rash.   Neurological: Negative for dizziness and headaches.   Psychiatric/Behavioral: Negative for agitation and confusion.       Objective:     Temp:  [99 °F (37.2 °C)-102.5 °F (39.2 °C)] 99 °F (37.2 °C)  Pulse:  [54-71] 67  Resp:  [18-26] 25  SpO2:  [94 %-99 %] 95 %  BP: (102-124)/(53-77) 124/61     Body mass index is 23.87 kg/m².           Drains     None                 Physical Exam   Nursing note and vitals reviewed.  Constitutional: He is oriented to person, place, and time. He appears well-developed and well-nourished. No distress.   HENT:   Head: Normocephalic and atraumatic.   Eyes: Pupils are equal, round, and reactive to light. Right eye exhibits no discharge. Left eye exhibits no discharge.   Cardiovascular: Normal rate.    Pulmonary/Chest: Effort normal. No respiratory distress.   Abdominal: Soft. He exhibits no distension. There is no tenderness.   R CVA tenderness   Genitourinary:   Genitourinary Comments: Penis circumcised   Neurological: He is alert and oriented to person, place, and time.   Skin: Skin is warm. He is diaphoretic.     Psychiatric: He has a normal mood and affect. His behavior is normal. Judgment and thought content normal.       Significant Labs:    BMP:  Recent Labs   Lab 02/15/20  0919   *   K 4.2   CL 97   CO2 22*   BUN 18   CREATININE 1.4   CALCIUM 9.3        CBC:  Recent Labs   Lab 02/15/20  0919   WBC 18.76*   HGB 13.1*   HCT 37.6*   *       All pertinent labs results from the past 24 hours have been reviewed.    Significant Imaging:  CT Abdomen/Pelvis with Contrast 2/15/20  - 1.3 cm right adrenal nodule, left adrenal unremarkable  - No left-sided stones, masses, or hydronephrosis  - Left ureter normal in course and caliber  - Mild right hydronephrosis; no right renal stones or masses  - 5.3x6.3 mm right proximal ureteral stone  - Bladder unremarkable  - Surgical clips in pelvis  - Prostate surgically absent      Assessment and Plan:     * Right ureteral stone  69 y.o. male with a history of HTN, afib (on Eliquis), prostate cancer s/p RRP in 2013 who [place] [facility] [place] due to right pyelonephritis in the setting of a 6.3 mm right proximal ureteral stone with proximal hydronephrosis.    - To OR for class A right ureteral stent placement  - Consent obtained. I have explained the indications, risks, benefits, and alternatives of the procedure in detail. The patient voices understanding and all questions have been answered. The patient agrees to proceed as planned. Patient's first language is Luxembourger but he also speaks English. I offered a Luxembourger  and he declined.  - NPO. Last meal at 6 am and had a cup of ice at 11 am.  - Follow-up urine and blood cultures. Continue empiric antibiotics and tailor to susceptibilities.  - Will defer work-up for right adrenal nodule as an outpatient   - Recommend admission to medicine vs. MICU given medical comorbidities.        VTE Risk Mitigation (From admission, onward)    None          Thank you for your consult. I will follow-up with patient. Please contact us if you have any additional questions.    Freddie Winters MD  Urology  [facility]

## 2020-02-15 NOTE — OP NOTE
Ureteral Stent Placement Operative Note    Preoperative Diagnosis:   1. Urolithiasis    Postoperative Diagnosis:  1. Urolithiasis    Procedure:  right ureteral stent placement    Attending Surgeon: Alejandro Anguiano MD    Anesthesia: General Endotracheal    EBL: <10 mL    Complications: None    Findings: Right Ureteral obstruction; purulent drainage.    Drains: Right ureteral stent    Reason for procedure: Antonio Gross is a very pleasant 69 y.o. male who presented with a history of obstructing urolithiasis. His fever and elevated WBC raised suspicion for infection behind the stone and recommendation was made for urgent ureteral stent placement.     Procedure in detail:  Informed consent was obtained by explaining all risks and benefits of the procedure to the patient in detail.  After informed consent, the patient was brought to the OR where General Endotracheal anesthesia  was administered by the anesthesia staff. Appropriate perioperative antibiotics were given within 30 minutes of beginning the procedure. A formal timeout was performed prior to the procedure. After induction, the patient was gently placed in lithotomy position with all pressure points padded. Bilateral sequential compression devices were applied and activated prior to induction. The patient was prepped and draped in standard fashion.    A 20 Faroese rigid cystoscope was passed per urethra into the bladder. Inspection of the bladder demonstrated no primary bladder pathology and no stones were seen to have passed into the bladder. There was persistent contrast behind the proximally obstructing stone and no retrograde pyelogram was performed.       A flexible-tipped Glidewire was advanced to the level of the renal pelvis, beyond the stone. Over the wire, a 6Fr x 26cm double-pigtailed ureteral stent was advanced into the ureter to the renal pelvis. The wire was withdrawn from the stent as the proximal end of the stent was advanced into the  bladder.  The proximal coil of the stent was confirmed in the renal pelvis and the distal coil was confirmed in the bladder. Purulent drainage was seen with decompression. A urine culture was then sent.     Of note, there was a standing column of contrast in the left distal ureter, though no ureteral dilation. There were no filling defects obvious on fluoroscopy in the left ureter. Review of the CT demonstrates no hydronephrosis or hydroureter and a normal delayed image after contrast on that side. With infected urine I saw no advantage to performing left retrograde pyelography.     He tolerated the procedure well and was extubated and transferred in stable condition to the recovery room.     We plan to see the patient back in 2-3 weeks, after adequate management of his infection, for definitive management of his right ureteral calculus with right USE.

## 2020-02-15 NOTE — HPI
69 y.o. male with a history of HTN, afib (on Eliquis), prostate cancer s/p RRP in 2013 who [place] [facility] [place] due to right pyelonephritis in the setting of a 6.3 mm right proximal ureteral stone with proximal hydronephrosis. Patient has had weakness and constant right flank pain which began 3 days ago. He had an episode of diarrhea 3 days ago and has had poor appetite since. He has been nauseated but has had no vomiting. He denies dysuria but endorses gross hematuria. No suprapubic pain. Denies UTI or stone history.    In the ED, he was febrile to 102.5. He has not been tachycardic or hypotensive. Lactate is normal. His WBC is elevated to 18.8 and his Cr is 1.4 (no baseline on file). Clean catch UA was nitrite negative and showed 6 RBC/hpf, >100 WBC/hpf, and occasional bacteria. He has received 4.5 g of Zosyn. CT abdomen/pelvis with contrast demonstrated mild right hydronephrosis secondary to a 5.3.x6.3 mm right proximal ureteral stone. He has a delayed nephrogram on the right. In addition, there is a 1.3 cm right adrenal nodule.

## 2020-02-15 NOTE — ANESTHESIA PREPROCEDURE EVALUATION
[facility]  Anesthesia Pre-Operative Evaluation         Patient Name: Antonio Gross  YOB: 1950  MRN: [id number]    SUBJECTIVE:     Pre-operative evaluation for Procedure(s) (LRB):  CYSTOSCOPY, WITH URETERAL STENT INSERTION (Right)     02/15/2020    Antonio Gross is a 69 y.o. male w/ a significant PMHx of HTN, afib (on Eliquis), prostate cancer s/p RRP in 2013 who presents to the [facility] on 2/15/20 due to right pyelonephritis in the setting of a 6.3 mm right proximal ureteral stone with proximal hydronephrosis.    Patient now presents for the above procedure(s).      LDA:       Peripheral IV - Single Lumen 02/15/20 0919 20 G Left Forearm (Active)   Site Assessment Clean;Dry;Intact 2/15/2020  9:19 AM   Dressing Intervention First dressing 2/15/2020  9:19 AM   Number of days: 0       Prev airway: None documented    Drips: None documented.      Patient Active Problem List   Diagnosis    Right ureteral stone    Pyelonephritis       Review of patient's allergies indicates:  No Known Allergies    Current Inpatient Medications:   piperacillin-tazobactam (ZOSYN) IVPB  4.5 g Intravenous Q8H       No current facility-administered medications on file prior to encounter.      Current Outpatient Medications on File Prior to Encounter   Medication Sig Dispense Refill    amiodarone HCl (AMIODARONE ORAL) Take 80 mg by mouth.      apixaban (ELIQUIS ORAL) Take by mouth.      levocetirizine (XYZAL) 5 MG tablet Take 1 tablet (5 mg total) by mouth every evening. for 10 days 10 tablet 0    lisinopril (PRINIVIL,ZESTRIL) 5 MG tablet Take 5 mg by mouth once daily.      methylPREDNISolone (MEDROL DOSEPACK) 4 mg tablet use as directed 21 tablet 0    metoprolol succinate (TOPROL-XL) 50 MG 24 hr tablet Take 50 mg by mouth once daily.         Past Surgical History:   Procedure Laterality Date     PROSTATECTOMY         Social History     Socioeconomic History    Marital status:      Spouse name: Not on file    Number of children: Not on file    Years of education: Not on file    Highest education level: Not on file   Occupational History    Not on file   Social Needs    Financial resource strain: Not on file    Food insecurity:     Worry: Not on file     Inability: Not on file    Transportation needs:     Medical: Not on file     Non-medical: Not on file   Tobacco Use    Smoking status: Never Smoker    Smokeless tobacco: Never Used   Substance and Sexual Activity    Alcohol use: Yes    Drug use: No    Sexual activity: Not on file   Lifestyle    Physical activity:     Days per week: Not on file     Minutes per session: Not on file    Stress: Not on file   Relationships    Social connections:     Talks on phone: Not on file     Gets together: Not on file     Attends [employer] service: Not on file     Active member of club or organization: Not on file     Attends meetings of clubs or organizations: Not on file     Relationship status: Not on file   Other Topics Concern    Not on file   Social History Narrative    Not on file       OBJECTIVE:     Vital Signs Range (Last 24H):  Temp:  [37.2 °C (99 °F)-39.2 °C (102.5 °F)]   Pulse:  [54-71]   Resp:  [18-26]   BP: (102-147)/(53-77)   SpO2:  [94 %-99 %]       Significant Labs:  Lab Results   Component Value Date    WBC 18.76 (H) 02/15/2020    HGB 13.1 (L) 02/15/2020    HCT 37.6 (L) 02/15/2020     (L) 02/15/2020    ALT 40 02/15/2020    AST 33 02/15/2020     (L) 02/15/2020    K 4.2 02/15/2020    CL 97 02/15/2020    CREATININE 1.4 02/15/2020    BUN 18 02/15/2020    CO2 22 (L) 02/15/2020       Diagnostic Studies: No relevant studies.    EKG:   No results found for this or any previous visit.    2D ECHO:  TTE:  No results found for this or any previous visit.    JOE:  No results found for this or any previous visit.    ASSESSMENT/PLAN:          Anesthesia Evaluation    I have reviewed the Patient Summary Reports.     I have reviewed the Medications.     Review of Systems  Anesthesia Hx:  No problems with previous Anesthesia  History of prior surgery of interest to airway management or planning: Denies Family Hx of Anesthesia complications.   Denies Personal Hx of Anesthesia complications.   Hematology/Oncology:         -- Anemia: --  Cancer in past history:  surgery    Cardiovascular:   Hypertension Dysrhythmias atrial fibrillation        Physical Exam  General:  Well nourished    Airway/Jaw/Neck:  Airway Findings: Mouth Opening: Normal Tongue: Large  General Airway Assessment: Adult  Mallampati: III  Improves to II with phonation.  TM Distance: 4 - 6 cm  Jaw/Neck Findings:  Neck ROM: Normal ROM     Eyes/Ears/Nose:  EYES/EARS/NOSE FINDINGS: Normal   Dental:  Dental Findings:   Chest/Lungs:  Chest/Lungs Findings: Clear to auscultation, Normal Respiratory Rate     Heart/Vascular:  Heart Findings: Rate: Normal  Rhythm: Regular Rhythm  Sounds: Normal  Vascular Findings:  Vascular Access: Peripheral IV(s)     Abdomen:  Abdomen Findings:  Normal, Nontender, Soft     Musculoskeletal:  Musculoskeletal Findings:    Skin:  Skin Findings:     Mental Status:  Mental Status Findings:  Cooperative, Alert and Oriented         Anesthesia Plan  Type of Anesthesia, risks & benefits discussed:  Anesthesia Type:  general  Patient's Preference: General  Intra-op Monitoring Plan: standard ASA monitors  Intra-op Monitoring Plan Comments:   Post Op Pain Control Plan: per primary service following discharge from PACU, IV/PO Opioids PRN and multimodal analgesia  Post Op Pain Control Plan Comments:   Induction:   IV  Beta Blocker:  Patient is on a Beta-Blocker and has received one dose within the past 24 hours (No further documentation required).       Informed Consent: Patient understands risks and agrees with Anesthesia plan.  Questions answered. Anesthesia consent signed  with patient.  ASA Score: 3  emergent   Day of Surgery Review of History & Physical:    H&P update referred to the surgeon.     Anesthesia Plan Notes: Discussed plan for general endotracheal anesthesia, pt understands and agrees with plan        Ready For Surgery From Anesthesia Perspective.

## 2020-02-15 NOTE — H&P
[facility]  Emergency Medicine  History & Physical      Patient Name: Antonio Gross  MRN: [id number]  Admission Date: 2/15/2020  Attending Physician: Keri Mauricio MD   Primary Care Provider: Primary Doctor [facility] Medicine Team: [facility]OSP MED D Lc Fabian MD     Patient information was obtained from patient and ER records.     Subjective:     Principal Problem:Right ureteral stone    Chief Complaint:   Chief Complaint   Patient presents with    Multiple Complaints     thurs morning noted unsteady, diarrhea        HPI:   Mr. Gloria is a 69 year old gentleman with PMH of HTN, atrial fibrillation (on Eliquis), prostate cancer s/p RRP in 2013 who presented to [facility]ED with 3 day history of generalized weakness, fatigue, and decreased po intake. He reports that he has had several episodes of diarrhea at the onset of his symptoms which have resolved. He also notes right flank and lower abdominal pain that is intermittent, non-radiating, crampy and 5/10 in severity. At baseline, the patient has a mild unsteady gait which has worsened due to the recent illness. He denies chills, nausea, vomiting, chest pain, shortness of breath or lower extremity swelling.     In the ED, he was febrile to 102.5F, tachypneic but not tachycardic. BP was stable and patient sating well on room air. WBC elevated at 18.8 and SCr was 1.4. Lactate normal and U/A showed > 100 WBC. CXR showed atelectasis without evidence of infection. CT A/P with contrast revealed mild hydronephrosis and obstructing 5 mm calculus at the right UPJ. The patient was started on zosyn, blood cultures sent and urology was consulted.     Past Medical History:   Diagnosis Date    A-fib     Hypertension        Past Surgical History:   Procedure Laterality Date    PROSTATECTOMY         Review of patient's allergies indicates:  No Known Allergies    No current facility-administered medications on file prior to encounter.      Current  Outpatient Medications on File Prior to Encounter   Medication Sig    amiodarone HCl (AMIODARONE ORAL) Take 80 mg by mouth.    apixaban (ELIQUIS ORAL) Take by mouth.    levocetirizine (XYZAL) 5 MG tablet Take 1 tablet (5 mg total) by mouth every evening. for 10 days    lisinopril (PRINIVIL,ZESTRIL) 5 MG tablet Take 5 mg by mouth once daily.    methylPREDNISolone (MEDROL DOSEPACK) 4 mg tablet use as directed    metoprolol succinate (TOPROL-XL) 50 MG 24 hr tablet Take 50 mg by mouth once daily.     Family History     Problem Relation (Age of Onset)    No Known Problems Mother, Father        Tobacco Use    Smoking status: Never Smoker    Smokeless tobacco: Never Used   Substance and Sexual Activity    Alcohol use: Yes    Drug use: No    Sexual activity: Not on file     Review of Systems   Constitutional: Positive for activity change, appetite change, fatigue and fever. Negative for chills.   HENT: Negative for congestion.    Eyes: Negative for visual disturbance.   Respiratory: Negative for cough and shortness of breath.    Cardiovascular: Negative for chest pain and leg swelling.   Gastrointestinal: Positive for abdominal pain. Negative for abdominal distention, nausea and vomiting.   Genitourinary: Positive for dysuria and flank pain. Negative for hematuria.   Musculoskeletal: Positive for gait problem.   Neurological: Positive for weakness. Negative for dizziness and numbness.   Psychiatric/Behavioral: Negative for confusion.     Objective:     Vital Signs (Most Recent):  Temp: 99 °F (37.2 °C) (02/15/20 1321)  Pulse: 65 (02/15/20 1507)  Resp: (!) 25 (02/15/20 1321)  BP: (!) 147/63 (02/15/20 1507)  SpO2: 96 % (02/15/20 1507) Vital Signs (24h Range):  Temp:  [99 °F (37.2 °C)-102.5 °F (39.2 °C)] 99 °F (37.2 °C)  Pulse:  [54-71] 65  Resp:  [18-26] 25  SpO2:  [94 %-99 %] 96 %  BP: (102-147)/(53-77) 147/63     Weight: 86.2 kg (190 lb)  Body mass index is 23.87 kg/m².    Physical Exam   Constitutional: He is  oriented to person, place, and time. He appears well-developed.   HENT:   Head: Normocephalic and atraumatic.   Eyes: Pupils are equal, round, and reactive to light. Conjunctivae and EOM are normal.   Neck: Neck supple.   Cardiovascular: Normal rate and regular rhythm.   Pulmonary/Chest: Effort normal and breath sounds normal.   Abdominal: Soft. Bowel sounds are normal. He exhibits no distension. There is tenderness. There is no guarding.   Musculoskeletal: Normal range of motion. He exhibits no edema.   Neurological: He is alert and oriented to person, place, and time.   Skin: Skin is warm. No erythema.   Psychiatric: He has a normal mood and affect.         CRANIAL NERVES     CN III, IV, VI   Pupils are equal, round, and reactive to light.  Extraocular motions are normal.       Significant Labs:   A1C: No results for input(s): HGBA1C in the last 4320 hours.  Blood Culture: No results for input(s): LABBLOO in the last 48 hours.  CBC:   Recent Labs   Lab 02/15/20  0919   WBC 18.76*   HGB 13.1*   HCT 37.6*   *     CMP:   Recent Labs   Lab 02/15/20  0919   *   K 4.2   CL 97   CO2 22*   *   BUN 18   CREATININE 1.4   CALCIUM 9.3   PROT 7.1   ALBUMIN 3.1*   BILITOT 2.2*   ALKPHOS 70   AST 33   ALT 40   ANIONGAP 9   EGFRNONAA 50.2*     Lactic Acid:   Recent Labs   Lab 02/15/20  0919 02/15/20  1329   LACTATE 1.7 1.1     Magnesium:   Recent Labs   Lab 02/15/20  0919   MG 1.5*     Urine Culture: No results for input(s): LABURIN in the last 48 hours.  Urine Studies:   Recent Labs   Lab 02/15/20  1145   COLORU Pamella   APPEARANCEUA Hazy*   PHUR 5.0   SPECGRAV 1.020   PROTEINUA 1+*   GLUCUA Negative   KETONESU Negative   BILIRUBINUA Negative   OCCULTUA 3+*   NITRITE Negative   LEUKOCYTESUR 3+*   RBCUA 6*   WBCUA >100*   BACTERIA Occasional   HYALINECASTS 0       Significant Imaging: I have reviewed and interpreted all pertinent imaging results/findings within the past 24 hours.     CT A/P  (2/15):      Assessment/Plan:     Active Diagnoses:    Diagnosis Date Noted POA    PRINCIPAL PROBLEM:  Right ureteral stone [N20.1] 02/15/2020 Yes    Pyelonephritis [N12] 02/15/2020 Yes      Problems Resolved During this Admission:     PLAN:    Sepsis due to pyelonephritis of right kidney   Obstructing right proximal ureteral stone   Hydronephrosis of right kidney   - febrile, tachycpneic, normotensive, sating well on room air  - WBC 18.8  - procal: 2.77  - lactate wnl  - CXR negative   - CT A/P showed obstructing 5 mm calculus at the right UPJ associated with mild hydronephrosis, renomegaly and perinephric stranding.   - U/A positive for UTI. followup ucx  - followup bcx  - 2.5L NS bolus in ED  - continue with NS @ 100 cc/hr  - urology consulted. apprec recs   -- NPO for class A right ureteral stent placement on 2/15  -- continue IV zosyn (D1- 2/15)  - tylenol and oxycodone prn for pain     Hyponatremia  - NA on admit, 128  - likely due to hypovolemia  - continue NS @ 100 cc/hr  - BMP daily     Renal insufficiency  - DDx: ALBERTO vs CKD  - Scr 1.4  - baseline creatinine not documented   - continue IVF  - BMP daily     Hypomagnesia   Hypophosphatemia   - replet prn     HTN  - hold home metoprolol and lisinopril due to normotensive   - resume antihypertensives when clinically appropriate     Atrial fibrillation   - resume Eliquis after stent placement   - hold metoprolol for now due to sepsis. Resume when clinically appropriate     Right adrenal nodule  - Outpatient endocrinology followup    Allergies  - continue home antihistamines      VTE Risk Mitigation (From admission, onward)    None        Time spent in care of patient: < 45 minutes     Lc Fabian MD  Department of Hospital Medicine   [facility]

## 2020-02-15 NOTE — TRANSFER OF CARE
"Anesthesia Transfer of Care Note    Patient: Antonio Gross    Procedure(s) Performed: Procedure(s) (LRB):  CYSTOSCOPY, WITH URETERAL STENT INSERTION (Right)    Patient location: PACU    Anesthesia Type: general    Transport from OR: Transported from OR on 6-10 L/min O2 by face mask with adequate spontaneous ventilation    Post pain: adequate analgesia    Post assessment: no apparent anesthetic complications and tolerated procedure well    Post vital signs: stable    Level of consciousness: awake    Nausea/Vomiting: no nausea/vomiting    Complications: none    Transfer of care protocol was followed      Last vitals:   Visit Vitals  BP (!) 145/70 (BP Location: Right arm, Patient Position: Lying)   Pulse 93   Temp 37.2 °C (99 °F)   Resp (!) 28   Ht 6' 2.8" (1.9 m)   Wt 86.2 kg (190 lb)   SpO2 99%   BMI 23.87 kg/m²     "

## 2020-02-15 NOTE — ANESTHESIA PROCEDURE NOTES
Intubation  Performed by: Jayne Ozuna CRNA  Authorized by: Chacho Bonilla MD     Intubation:     Induction:  Intravenous    Intubated:  Postinduction    Mask Ventilation:  N/a    Attempts:  1    Attempted By:  CRNA    Method of Intubation:  Direct    Blade:  Patel 2    Laryngeal View Grade: Grade IIA - cords partially seen      Difficult Airway Encountered?: No      Complications:  None    Airway Device:  Oral endotracheal tube    Airway Device Size:  8.0    Style/Cuff Inflation:  Cuffed    Inflation Amount (mL):  6    Tube secured:  23    Secured at:  The lips    Placement Verified By:  Capnometry    Complicating Factors:  Anterior larynx    Findings Post-Intubation:  BS equal bilateral

## 2020-02-15 NOTE — PROGRESS NOTES
Patient admitted to recovery see [facility] for complete assessment pacu bcg's maintained safety measures verified patient waking from sedation . Orders being carried out. Also blood drawn and sent down to lab as per orders.

## 2020-02-15 NOTE — CONSULTS
[facility]  Urology  Consult Note    Patient Name: Antonio Gross  MRN: [id number]  Admission Date: 2/15/2020  Hospital Length of Stay: 0   Code Status: No Order   Attending Provider: Keri Mauricio MD   Consulting Provider: Freddie Winters MD  Primary Care Physician: Primary Doctor No  Principal Problem:Right ureteral stone    Inpatient consult to Urology  Consult performed by: Freddie Winters MD  Consult ordered by: Keri Mauricio MD          Subjective:     HPI:  69 y.o. male with a history of HTN, afib (on Eliquis), prostate cancer s/p RRP in 2013 who presents to the [facility] ED on 2/15/20 due to right pyelonephritis in the setting of a 6.3 mm right proximal ureteral stone with proximal hydronephrosis. Patient has had weakness and constant right flank pain which began 3 days ago. He had an episode of diarrhea 3 days ago and has had poor appetite since. He has been nauseated but has had no vomiting. He denies dysuria but endorses gross hematuria. No suprapubic pain. Denies UTI or stone history.    In the ED, he was febrile to 102.5. He has not been tachycardic or hypotensive. Lactate is normal. His WBC is elevated to 18.8 and his Cr is 1.4 (no baseline on file). Clean catch UA was nitrite negative and showed 6 RBC/hpf, >100 WBC/hpf, and occasional bacteria. He has received 4.5 g of Zosyn. CT abdomen/pelvis with contrast demonstrated mild right hydronephrosis secondary to a 5.3.x6.3 mm right proximal ureteral stone. He has a delayed nephrogram on the right. In addition, there is a 1.3 cm right adrenal nodule.    Past Medical History:   Diagnosis Date    A-fib     Hypertension        Past Surgical History:   Procedure Laterality Date    PROSTATECTOMY         Review of patient's allergies indicates:  No Known Allergies    Family History     Problem Relation (Age of Onset)    No Known Problems Mother, Father          Tobacco Use    Smoking status: Never Smoker    Smokeless tobacco:  Never Used   Substance and Sexual Activity    Alcohol use: Yes    Drug use: No    Sexual activity: Not on file       Review of Systems   Constitutional: Positive for appetite change, chills and fever.   HENT: Negative.    Eyes: Negative.    Respiratory: Negative for chest tightness and shortness of breath.    Cardiovascular: Negative for chest pain and palpitations.   Gastrointestinal: Positive for abdominal pain and nausea. Negative for vomiting.   Genitourinary: Positive for flank pain and hematuria. Negative for dysuria.   Musculoskeletal: Negative for arthralgias and gait problem.   Skin: Negative for color change and rash.   Neurological: Negative for dizziness and headaches.   Psychiatric/Behavioral: Negative for agitation and confusion.       Objective:     Temp:  [99 °F (37.2 °C)-102.5 °F (39.2 °C)] 99 °F (37.2 °C)  Pulse:  [54-71] 67  Resp:  [18-26] 25  SpO2:  [94 %-99 %] 95 %  BP: (102-124)/(53-77) 124/61     Body mass index is 23.87 kg/m².           Drains     None                 Physical Exam   Nursing note and vitals reviewed.  Constitutional: He is oriented to person, place, and time. He appears well-developed and well-nourished. No distress.   HENT:   Head: Normocephalic and atraumatic.   Eyes: Pupils are equal, round, and reactive to light. Right eye exhibits no discharge. Left eye exhibits no discharge.   Cardiovascular: Normal rate.    Pulmonary/Chest: Effort normal. No respiratory distress.   Abdominal: Soft. He exhibits no distension. There is no tenderness.   R CVA tenderness   Genitourinary:   Genitourinary Comments: Penis circumcised   Neurological: He is alert and oriented to person, place, and time.   Skin: Skin is warm. He is diaphoretic.     Psychiatric: He has a normal mood and affect. His behavior is normal. Judgment and thought content normal.       Significant Labs:    BMP:  Recent Labs   Lab 02/15/20  0919   *   K 4.2   CL 97   CO2 22*   BUN 18   CREATININE 1.4   CALCIUM 9.3        CBC:  Recent Labs   Lab 02/15/20  0919   WBC 18.76*   HGB 13.1*   HCT 37.6*   *       All pertinent labs results from the past 24 hours have been reviewed.    Significant Imaging:  CT Abdomen/Pelvis with Contrast 2/15/20  - 1.3 cm right adrenal nodule, left adrenal unremarkable  - No left-sided stones, masses, or hydronephrosis  - Left ureter normal in course and caliber  - Mild right hydronephrosis; no right renal stones or masses  - 5.3x6.3 mm right proximal ureteral stone  - Bladder unremarkable  - Surgical clips in pelvis  - Prostate surgically absent      Assessment and Plan:     * Right ureteral stone  69 y.o. male with a history of HTN, afib (on Eliquis), prostate cancer s/p RRP in 2013 who presents to the [facility] ED on 2/15/20 due to right pyelonephritis in the setting of a 6.3 mm right proximal ureteral stone with proximal hydronephrosis.    - To OR for class A right ureteral stent placement  - Consent obtained. I have explained the indications, risks, benefits, and alternatives of the procedure in detail. The patient voices understanding and all questions have been answered. The patient agrees to proceed as planned. Patient's first language is Filipino but he also speaks English. I offered a Filipino  and he declined.  - NPO. Last meal at 6 am and had a cup of ice at 11 am.  - Follow-up urine and blood cultures. Continue empiric antibiotics and tailor to susceptibilities.  - Will defer work-up for right adrenal nodule as an outpatient   - Recommend admission to medicine vs. MICU given medical comorbidities.        VTE Risk Mitigation (From admission, onward)    None          Thank you for your consult. I will follow-up with patient. Please contact us if you have any additional questions.    Freddie Winters MD  Urology  [facility]

## 2020-02-15 NOTE — ED PROVIDER NOTES
Encounter Date: 2/15/2020       History     Chief Complaint   Patient presents with    Multiple Complaints     thurs morning noted unsteady, diarrhea     Mr. Gloria is a 69 M history of AFib on Eliquis, hypertension brought in by wife for evaluation of generalized weakness. Patient states for the past 3 days he has felt fatigued, weak with decreased p.o. intake.  He did report several episodes of diarrhea when symptoms started which is currently resolved.  He continues to have no appetite or energy.  Has been drinking fluids to stay hydrated.  Last bowel movement was 3 days ago.  Also reports right mid flank/lower abdominal pain that is intermittent.  Pain is described as cramping in nature, 5/10, without radiation.  He denies dysuria, nausea, vomiting.  Wife states he typically has non steady gait at baseline which has gotten worse since he has not felt well.        Review of patient's allergies indicates:  No Known Allergies  Past Medical History:   Diagnosis Date    A-fib     Hypertension      Past Surgical History:   Procedure Laterality Date    PROSTATECTOMY       Family History   Problem Relation Age of Onset    No Known Problems Mother     No Known Problems Father      Social History     Tobacco Use    Smoking status: Never Smoker    Smokeless tobacco: Never Used   Substance Use Topics    Alcohol use: Yes    Drug use: No     Review of Systems   Constitutional: Positive for activity change, appetite change, chills, fatigue and fever.   HENT: Negative for congestion, rhinorrhea and sore throat.    Eyes: Negative for photophobia.   Respiratory: Negative for shortness of breath.    Cardiovascular: Negative for chest pain, palpitations and leg swelling.   Gastrointestinal: Positive for abdominal pain and diarrhea. Negative for nausea and vomiting.   Genitourinary: Negative for dysuria and hematuria.   Musculoskeletal: Positive for gait problem. Negative for arthralgias and back pain.   Skin: Negative for  pallor and rash.   Neurological: Positive for weakness. Negative for light-headedness.   Psychiatric/Behavioral: Negative for confusion.       Physical Exam     Initial Vitals [02/15/20 0851]   BP Pulse Resp Temp SpO2   (!) 111/58 71 18 (!) 102.5 °F (39.2 °C) 96 %      MAP       --         Physical Exam    Nursing note and vitals reviewed.  Constitutional: He appears well-developed. No distress.   HENT:   Mouth/Throat: Oropharynx is clear and moist.   Eyes: Conjunctivae are normal. No scleral icterus.   Cardiovascular: Normal rate, regular rhythm and intact distal pulses.   Pulmonary/Chest: Breath sounds normal. No stridor. He has no wheezes. He has no rales.   Abdominal: Soft. There is tenderness (Right mid tenderness with no rebound or guarding).   Musculoskeletal: Normal range of motion. He exhibits no edema.   Neurological: He is alert and oriented to person, place, and time. He has normal strength.   Skin: Skin is warm. Capillary refill takes less than 2 seconds. No rash noted.         ED Course   Procedures  Labs Reviewed   CBC W/ AUTO DIFFERENTIAL - Abnormal; Notable for the following components:       Result Value    WBC 18.76 (*)     RBC 3.77 (*)     Hemoglobin 13.1 (*)     Hematocrit 37.6 (*)     Mean Corpuscular Volume 100 (*)     Mean Corpuscular Hemoglobin 34.7 (*)     Platelets 100 (*)     Immature Granulocytes 3.5 (*)     Gran # (ANC) 15.9 (*)     Immature Grans (Abs) 0.66 (*)     Lymph # 0.5 (*)     Mono # 1.7 (*)     Gran% 84.8 (*)     Lymph% 2.7 (*)     All other components within normal limits   COMPREHENSIVE METABOLIC PANEL - Abnormal; Notable for the following components:    Sodium 128 (*)     CO2 22 (*)     Glucose 115 (*)     Albumin 3.1 (*)     Total Bilirubin 2.2 (*)     eGFR if  58.0 (*)     eGFR if non  50.2 (*)     All other components within normal limits   URINALYSIS, REFLEX TO URINE CULTURE - Abnormal; Notable for the following components:    Appearance,  UA Hazy (*)     Protein, UA 1+ (*)     Occult Blood UA 3+ (*)     Leukocytes, UA 3+ (*)     All other components within normal limits    Narrative:     Preferred Collection Type->Urine, Clean Catch   PROCALCITONIN - Abnormal; Notable for the following components:    Procalcitonin 2.77 (*)     All other components within normal limits   MAGNESIUM - Abnormal; Notable for the following components:    Magnesium 1.5 (*)     All other components within normal limits   PHOSPHORUS - Abnormal; Notable for the following components:    Phosphorus 2.6 (*)     All other components within normal limits   URINALYSIS MICROSCOPIC - Abnormal; Notable for the following components:    RBC, UA 6 (*)     WBC, UA >100 (*)     All other components within normal limits    Narrative:     Preferred Collection Type->Urine, Clean Catch   INFLUENZA A & B BY MOLECULAR   CULTURE, BLOOD   CULTURE, BLOOD   CULTURE, URINE   LACTIC ACID, PLASMA   LACTIC ACID, PLASMA     EKG Readings: (Independently Interpreted)   EKG:  Sinus rhythm at 69, left bundle branch block, no ST elevations or depressions       Imaging Results           CT Abdomen Pelvis With Contrast (Final result)  Result time 02/15/20 14:08:06    Final result by Rosa Brizuela MD (02/15/20 14:08:06)                 Impression:      1. Obstructing calculus at the right ureteral pelvic junction measuring 5 mm associated with mild right hydronephrosis, renomegaly, and perinephric stranding.  2. Hepatosplenomegaly with slight nodularity of the liver suggesting the possibility of cirrhosis.  Correlate with liver function tests  3. Two indeterminate enhancing liver lesions.  Abdominal MRI is recommended for further characterization.  4. 1.3 cm right adrenal nodule with nonspecific enhancement characteristics.  This could also be further evaluated with abdominal MRI.  5. Postsurgical change of prior prostatectomy  6. Fat containing umbilical hernia and bilateral inguinal hernias  This report  was flagged in Epic as abnormal.      Electronically signed by: Rosa Brizuela MD  Date:    02/15/2020  Time:    14:08             Narrative:    EXAMINATION:  CT ABDOMEN PELVIS WITH CONTRAST    CLINICAL HISTORY:  Weakness, diarrhea, abdominal pain.    TECHNIQUE:  Low dose axial images, sagittal and coronal reformations were obtained from the lung bases to the pubic symphysis following the IV administration of  Omnipaque 350 without oral contrast.    COMPARISON:  None    FINDINGS:  The lung bases are clear.    The liver is mildly enlarged measuring approximately 19 cm and there is a suggestion of slight peripheral nodularity..  Background hepatic parenchyma is diffusely low attenuation suggesting steatosis.  There is an enhancing lesion in hepatic segment 8 measuring 2 cm (2:19).  A 2nd similar masses noted adjacent to the gallbladder fossa measuring approximately 2.5 cm (02:40).  The gallbladder is nondistended.  The portal vein is patent.  The spleen is mildly enlarged, measuring 13 cm.    There is a 1.3 cm right adrenal nodule with nonspecific attenuation characteristics.    There is relative hypoattenuation of the right kidney with renomegaly mild hydronephrosis as well as perinephric stranding associated with an obstructing calculus at the right ureteropelvic junction measuring 5 mm.    Stomach and small bowel are nondilated.  The colon has normal appearance    The bladder has a normal appearance.  There is postsurgical change of prior prostatectomy.  There is a fat containing umbilical hernia.    There is no free air or free fluid in the abdomen or pelvis.  No adenopathy is seen.  There are bilateral fat containing inguinal hernias.    There is osteopenia and degenerative change of the spine.                               X-Ray Chest AP Portable (Final result)  Result time 02/15/20 09:23:34    Final result by Jensen Lewis DO (02/15/20 09:23:34)                 Impression:      Left basilar subsegmental  atelectasis.  Otherwise no acute cardiopulmonary abnormality.      Electronically signed by: Jensen Lewis  Date:    02/15/2020  Time:    09:23             Narrative:    EXAMINATION:  XR CHEST AP PORTABLE    CLINICAL HISTORY:  Sepsis;    TECHNIQUE:  AP chest radiograph.    COMPARISON:  None available.    FINDINGS:  The lungs are well expanded.  There are mild streaky opacities in the left lung base, likely subsegmental atelectasis.  The pleural spaces are clear.  The cardiac silhouette is unremarkable.  Calcification of the aortic arch.  The visualized osseous structures demonstrate degenerative changes.                              X-Rays:   Independently Interpreted Readings:   Other Readings:  Chest x-ray:  No large consolidation or effusion    Medical Decision Making:   History:   I obtained history from: someone other than patient.       <> Summary of History: Wife at bedside  Old Medical Records: I decided to obtain old medical records.  Initial Assessment:   Emergent evaluation of 69-year-old male history of AFib, hypertension here for generalized weakness.  Patient was found to be febrile to 102.5 on arrival.  Differential Diagnosis:   Colitis, diverticulitis, enteritis, influenza, UTI, pneumonia, ALBERTO, dehydration, electrolyte derangement  Clinical Tests:   Lab Tests: Reviewed and Ordered  Radiological Study: Ordered and Reviewed  Medical Tests: Ordered and Reviewed  ED Management:  - labs  - EKG  - chest x-ray                  Attending Attestation:         Attending Critical Care:   Critical Care Times:   ==============================================================  · Total Critical Care Time - exclusive of procedural time: 35 minutes.  ==============================================================  Critical care was necessary to treat or prevent imminent or life-threatening deterioration of the following conditions: sepsis.   Critical care was time spent personally by me on the following activities:  examination of patient, review of old charts, ordering lab, x-rays, and/or EKG, discussion with consultants, evaluation of patient's response to treatment and re-evaluation of patient's conition.   Critical Care Condition: potentially life-threatening               ED Course as of Feb 15 1549   Sat Feb 15, 2020   1036 WBC(!): 18.76 [GM]   1036 Hemoglobin(!): 13.1 [GM]   1036 Platelets(!): 100 [GM]   1036 Sodium(!): 128 [GM]   1036 Creatinine: 1.4 [GM]   1036 Procalcitonin(!): 2.77 [GM]   1036 2/4 SIRS criteria- Zosyn given for possible intraabdominal source.    Magnesium(!): 1.5 [GM]   1250 UA with > 100 WBC, occasional bacteria, 3+ occult blood, 3+ leuks    [GM]   1546 CT abd/pelvis: 5 mm obstructing stone with hydronephrosis on the right.     Urology paged.  Will evaluated the patient    Pt admitted to  for urosepsis    [GM]      ED Course User Index  [GM] Keri Mauricio MD                Clinical Impression:       ICD-10-CM ICD-9-CM   1. Pyelonephritis N12 590.80   2. Fever R50.9 780.60   3. Sepsis, due to unspecified organism, unspecified whether acute organ dysfunction present A41.9 038.9     995.91   4. Nephrolithiasis N20.0 592.0         Disposition:   Disposition: Admitted  Condition: Serious                     Keri Mauricio MD  02/15/20 3248

## 2020-02-15 NOTE — ED NOTES
LOC: The patient is awake, alert and aware of environment with an appropriate affect, the patient is oriented x 3 and speaking appropriately.  APPEARANCE: patient is clean and well groomed, patient's clothing is properly fastened.  SKIN:  skin intact, no breakdown or bruising noted.  MUSCULOSKELETAL: Patient moving all extremities spontaneously, no obvious swelling or deformities noted.  RESPIRATORY: Airway is open and patent, respirations are spontaneous, patient has a normal effort and rate, no accessory muscle use noted  NEUROLOGIC:  facial expression is symmetrical, patient moving all extremities spontaneously, normal sensation in all extremities when touched with a finger.  Follows all commands appropriately.    Patient states since Wednesday night he has not been feeling well, patient states he has been weak all over and more tired, patient states he has not been having much of a appetite but trys to make himself drink water when he can, patient states he had a few episodes of diarrhea on Wednesday night but nothing since then, patient states he also has pain that comes and goes on his right side/right upper abd, cardiac monitoring in progress, will continue to monitor

## 2020-02-16 LAB
ALBUMIN SERPL BCP-MCNC: 2.4 G/DL (ref 3.5–5.2)
ALP SERPL-CCNC: 63 U/L (ref 55–135)
ALT SERPL W/O P-5'-P-CCNC: 34 U/L (ref 10–44)
ANION GAP SERPL CALC-SCNC: 9 MMOL/L (ref 8–16)
AST SERPL-CCNC: 28 U/L (ref 10–40)
BACTERIA UR CULT: NORMAL
BACTERIA UR CULT: NORMAL
BASOPHILS # BLD AUTO: 0 K/UL (ref 0–0.2)
BASOPHILS NFR BLD: 0 % (ref 0–1.9)
BILIRUB SERPL-MCNC: 1.1 MG/DL (ref 0.1–1)
BUN SERPL-MCNC: 19 MG/DL (ref 8–23)
CALCIUM SERPL-MCNC: 8.8 MG/DL (ref 8.7–10.5)
CHLORIDE SERPL-SCNC: 102 MMOL/L (ref 95–110)
CO2 SERPL-SCNC: 22 MMOL/L (ref 23–29)
CREAT SERPL-MCNC: 1.2 MG/DL (ref 0.5–1.4)
DIFFERENTIAL METHOD: ABNORMAL
EOSINOPHIL # BLD AUTO: 0 K/UL (ref 0–0.5)
EOSINOPHIL NFR BLD: 0 % (ref 0–8)
ERYTHROCYTE [DISTWIDTH] IN BLOOD BY AUTOMATED COUNT: 13.1 % (ref 11.5–14.5)
EST. GFR  (AFRICAN AMERICAN): >60 ML/MIN/1.73 M^2
EST. GFR  (NON AFRICAN AMERICAN): >60 ML/MIN/1.73 M^2
ESTIMATED AVG GLUCOSE: 77 MG/DL (ref 68–131)
GLUCOSE SERPL-MCNC: 235 MG/DL (ref 70–110)
HBA1C MFR BLD HPLC: 4.3 % (ref 4–5.6)
HCT VFR BLD AUTO: 35.9 % (ref 40–54)
HGB BLD-MCNC: 11.9 G/DL (ref 14–18)
IMM GRANULOCYTES # BLD AUTO: 0.21 K/UL (ref 0–0.04)
IMM GRANULOCYTES NFR BLD AUTO: 2.5 % (ref 0–0.5)
LYMPHOCYTES # BLD AUTO: 0.3 K/UL (ref 1–4.8)
LYMPHOCYTES NFR BLD: 4 % (ref 18–48)
MAGNESIUM SERPL-MCNC: 2.1 MG/DL (ref 1.6–2.6)
MCH RBC QN AUTO: 34.6 PG (ref 27–31)
MCHC RBC AUTO-ENTMCNC: 33.1 G/DL (ref 32–36)
MCV RBC AUTO: 104 FL (ref 82–98)
MONOCYTES # BLD AUTO: 0.5 K/UL (ref 0.3–1)
MONOCYTES NFR BLD: 5.5 % (ref 4–15)
NEUTROPHILS # BLD AUTO: 7.3 K/UL (ref 1.8–7.7)
NEUTROPHILS NFR BLD: 88 % (ref 38–73)
NRBC BLD-RTO: 0 /100 WBC
PHOSPHATE SERPL-MCNC: 3.2 MG/DL (ref 2.7–4.5)
PLATELET # BLD AUTO: 73 K/UL (ref 150–350)
PMV BLD AUTO: 10.9 FL (ref 9.2–12.9)
POTASSIUM SERPL-SCNC: 4 MMOL/L (ref 3.5–5.1)
PROT SERPL-MCNC: 6.2 G/DL (ref 6–8.4)
RBC # BLD AUTO: 3.44 M/UL (ref 4.6–6.2)
SODIUM SERPL-SCNC: 133 MMOL/L (ref 136–145)
TROPONIN I SERPL DL<=0.01 NG/ML-MCNC: 0.12 NG/ML (ref 0–0.03)
WBC # BLD AUTO: 8.29 K/UL (ref 3.9–12.7)

## 2020-02-16 PROCEDURE — 84484 ASSAY OF TROPONIN QUANT: CPT

## 2020-02-16 PROCEDURE — 84100 ASSAY OF PHOSPHORUS: CPT

## 2020-02-16 PROCEDURE — 80053 COMPREHEN METABOLIC PANEL: CPT

## 2020-02-16 PROCEDURE — 25000003 PHARM REV CODE 250: Performed by: HOSPITALIST

## 2020-02-16 PROCEDURE — 11000001 HC ACUTE MED/SURG PRIVATE ROOM

## 2020-02-16 PROCEDURE — 99233 SBSQ HOSP IP/OBS HIGH 50: CPT | Mod: ,,, | Performed by: INTERNAL MEDICINE

## 2020-02-16 PROCEDURE — 25000003 PHARM REV CODE 250: Performed by: INTERNAL MEDICINE

## 2020-02-16 PROCEDURE — 85025 COMPLETE CBC W/AUTO DIFF WBC: CPT

## 2020-02-16 PROCEDURE — 99233 PR SUBSEQUENT HOSPITAL CARE,LEVL III: ICD-10-PCS | Mod: ,,, | Performed by: INTERNAL MEDICINE

## 2020-02-16 PROCEDURE — 63600175 PHARM REV CODE 636 W HCPCS: Performed by: INTERNAL MEDICINE

## 2020-02-16 PROCEDURE — 83735 ASSAY OF MAGNESIUM: CPT

## 2020-02-16 PROCEDURE — 25000003 PHARM REV CODE 250: Performed by: STUDENT IN AN ORGANIZED HEALTH CARE EDUCATION/TRAINING PROGRAM

## 2020-02-16 PROCEDURE — 83036 HEMOGLOBIN GLYCOSYLATED A1C: CPT

## 2020-02-16 PROCEDURE — 36415 COLL VENOUS BLD VENIPUNCTURE: CPT

## 2020-02-16 RX ORDER — CANDESARTAN 4 MG/1
8 TABLET ORAL DAILY
Status: DISCONTINUED | OUTPATIENT
Start: 2020-02-16 | End: 2020-02-16

## 2020-02-16 RX ORDER — AMIODARONE HYDROCHLORIDE 100 MG/1
100 TABLET ORAL DAILY
Status: DISCONTINUED | OUTPATIENT
Start: 2020-02-16 | End: 2020-02-16

## 2020-02-16 RX ORDER — VANCOMYCIN 1.75 GRAM/500 ML IN 0.9 % SODIUM CHLORIDE INTRAVENOUS
1750 ONCE
Status: COMPLETED | OUTPATIENT
Start: 2020-02-16 | End: 2020-02-16

## 2020-02-16 RX ORDER — METOPROLOL SUCCINATE 50 MG/1
50 TABLET, EXTENDED RELEASE ORAL DAILY
Status: DISCONTINUED | OUTPATIENT
Start: 2020-02-17 | End: 2020-02-16

## 2020-02-16 RX ORDER — METOPROLOL SUCCINATE 50 MG/1
50 TABLET, EXTENDED RELEASE ORAL DAILY
Status: DISCONTINUED | OUTPATIENT
Start: 2020-02-16 | End: 2020-02-17

## 2020-02-16 RX ORDER — CANDESARTAN 4 MG/1
8 TABLET ORAL DAILY
Status: DISCONTINUED | OUTPATIENT
Start: 2020-02-16 | End: 2020-02-19

## 2020-02-16 RX ORDER — CANDESARTAN 8 MG/1
8 TABLET ORAL DAILY
COMMUNITY

## 2020-02-16 RX ORDER — METOPROLOL SUCCINATE 50 MG/1
50 TABLET, EXTENDED RELEASE ORAL DAILY
Status: DISCONTINUED | OUTPATIENT
Start: 2020-02-16 | End: 2020-02-16

## 2020-02-16 RX ORDER — AMIODARONE HYDROCHLORIDE 100 MG/1
100 TABLET ORAL DAILY
Status: DISCONTINUED | OUTPATIENT
Start: 2020-02-16 | End: 2020-02-23 | Stop reason: HOSPADM

## 2020-02-16 RX ADMIN — PIPERACILLIN SODIUM AND TAZOBACTAM SODIUM 4.5 G: 4; .5 INJECTION, POWDER, LYOPHILIZED, FOR SOLUTION INTRAVENOUS at 09:02

## 2020-02-16 RX ADMIN — VANCOMYCIN 1.75 GRAM/500 ML IN 0.9 % SODIUM CHLORIDE INTRAVENOUS 1750 MG: at 03:02

## 2020-02-16 RX ADMIN — CETIRIZINE HYDROCHLORIDE 5 MG: 5 TABLET ORAL at 08:02

## 2020-02-16 RX ADMIN — AMIODARONE HYDROCHLORIDE 100 MG: 100 TABLET ORAL at 07:02

## 2020-02-16 RX ADMIN — APIXABAN 5 MG: 5 TABLET, FILM COATED ORAL at 09:02

## 2020-02-16 RX ADMIN — TAMSULOSIN HYDROCHLORIDE 0.4 MG: 0.4 CAPSULE ORAL at 08:02

## 2020-02-16 RX ADMIN — PIPERACILLIN SODIUM AND TAZOBACTAM SODIUM 4.5 G: 4; .5 INJECTION, POWDER, LYOPHILIZED, FOR SOLUTION INTRAVENOUS at 03:02

## 2020-02-16 RX ADMIN — METOPROLOL SUCCINATE 50 MG: 50 TABLET, EXTENDED RELEASE ORAL at 07:02

## 2020-02-16 RX ADMIN — CANDESARTAN CILEXETIL 8 MG: 4 TABLET ORAL at 07:02

## 2020-02-16 RX ADMIN — APIXABAN 5 MG: 5 TABLET, FILM COATED ORAL at 08:02

## 2020-02-16 RX ADMIN — PIPERACILLIN SODIUM AND TAZOBACTAM SODIUM 4.5 G: 4; .5 INJECTION, POWDER, LYOPHILIZED, FOR SOLUTION INTRAVENOUS at 12:02

## 2020-02-16 NOTE — ASSESSMENT & PLAN NOTE
69 y.o. male with a history of HTN, afib (on Eliquis), prostate cancer s/p RRP in 2013 who presents to the [facility] ED on 2/15/20 due to right pyelonephritis in the setting of a 6.3 mm right proximal ureteral stone with proximal hydronephrosis. S/P right ureteral stent placement on 2/15/20.    - Follow up urine and blood cultures  - Continue broad spectrum abx until cultures results  - Currently on Zosyn, would broaden given blood cultures from [facility], possibly staph  - Will need to be discharged with 2 weeks of abx  - Patient states he has an upcoming trip to [place] and will be returning on March 8. Will set up for right ureteroscopy upon his return.  - Further management per medicine team

## 2020-02-16 NOTE — SUBJECTIVE & OBJECTIVE
Interval History:   No acute events overnight  Denies pain  Mild hematuria but voiding well  Afebrile for 12 hours  Tolerating regular diet, no nausea  Blood cultures growing gram positive cocci    Review of Systems  Objective:     Temp:  [97.9 °F (36.6 °C)-102.5 °F (39.2 °C)] 98.4 °F (36.9 °C)  Pulse:  [54-97] 58  Resp:  [16-29] 16  SpO2:  [94 %-99 %] 97 %  BP: (102-151)/(53-77) 127/62     Body mass index is 24.4 kg/m².           Drains     None                 Physical Exam   Nursing note and vitals reviewed.  Constitutional: He is oriented to person, place, and time. He appears well-developed and well-nourished. No distress.   HENT:   Head: Normocephalic and atraumatic.   Eyes: Pupils are equal, round, and reactive to light. Right eye exhibits no discharge. Left eye exhibits no discharge.   Cardiovascular: Normal rate and regular rhythm.    Pulmonary/Chest: Effort normal. No respiratory distress.   Abdominal: Soft. He exhibits no distension. There is no tenderness.   R CVA tenderness   Genitourinary:   Genitourinary Comments: Penis circumcised   Neurological: He is alert and oriented to person, place, and time.   Skin: Skin is warm. He is not diaphoretic.     Psychiatric: He has a normal mood and affect. His behavior is normal. Judgment and thought content normal.       Significant Labs:    BMP:  Recent Labs   Lab 02/15/20  0919 02/16/20  0343   * 133*   K 4.2 4.0   CL 97 102   CO2 22* 22*   BUN 18 19   CREATININE 1.4 1.2   CALCIUM 9.3 8.8       CBC:   Recent Labs   Lab 02/15/20  0919 02/16/20  0343   WBC 18.76* 8.29   HGB 13.1* 11.9*   HCT 37.6* 35.9*   * 73*       Blood Culture:   Recent Labs   Lab 02/15/20  0918 02/15/20  0930   LABBLOO Gram stain imelda bottle: Gram positive cocci in clusters resembling Staph   Positive results previously called 02/16/2020  06:05   Gram stain aer bottle: Gram positive cocci in clusters resembling Staph   02/16/2020  06:05 Gram stain imelda bottle: Gram positive  cocci in clusters resembling Staph   Results called to and read back by:Deyanira Nava RN 02/16/2020  06:04   Gram stain aer bottle: Gram positive cocci in clusters resembling Staph   02/16/2020  06:04     Urine Studies:   Recent Labs   Lab 02/15/20  1145   COLORU Pamella   APPEARANCEUA Hazy*   PHUR 5.0   SPECGRAV 1.020   PROTEINUA 1+*   GLUCUA Negative   KETONESU Negative   BILIRUBINUA Negative   OCCULTUA 3+*   NITRITE Negative   LEUKOCYTESUR 3+*   RBCUA 6*   WBCUA >100*   BACTERIA Occasional   HYALINECASTS 0       Significant Imaging:  All pertinent imaging results/findings from the past 24 hours have been reviewed.

## 2020-02-16 NOTE — PROGRESS NOTES
Pharmacokinetic Initial Assessment: IV Vancomycin    Assessment/Plan:    · Loading dose of vancomycin 1750 mg for 2/16 at 1530 followed by a maintenance dose of 1250 mg Q24H  · Desired trough is 15-20 mcg/mL   · A trough has been ordered 30 mins prior to the 3rd dose for 2/18 at 1500     Pharmacy will continue to follow and monitor vancomycin.      Please contact pharmacy at extension [phone] with any questions regarding this assessment.     Thank you for the consult,   Blanca Stinson, PharmD        Patient brief summary:  Antonio Gross is a 69 y.o. male initiated on antimicrobial therapy with IV Vancomycin for treatment of suspected bacteremia    Drug Allergies:   Review of patient's allergies indicates:  No Known Allergies    Actual Body Weight:   86.2 kg    Renal Function:   Estimated Creatinine Clearance: 65.6 mL/min (based on SCr of 1.2 mg/dL).,     Dialysis Method (if applicable):  N/A    CBC (last 72 hours):  Recent Labs   Lab Result Units 02/15/20  0919 02/16/20  0343 02/16/20  0502   WBC K/uL 18.76* 8.29  --    Hemoglobin g/dL 13.1* 11.9*  --    Hemoglobin A1C %  --   --  4.3   Hematocrit % 37.6* 35.9*  --    Platelets K/uL 100* 73*  --    Gran% % 84.8* 88.0*  --    Lymph% % 2.7* 4.0*  --    Mono% % 8.8 5.5  --    Eosinophil% % 0.0 0.0  --    Basophil% % 0.2 0.0  --    Differential Method  Automated Automated  --        Metabolic Panel (last 72 hours):  Recent Labs   Lab Result Units 02/15/20  0919 02/15/20  1145 02/16/20  0343   Sodium mmol/L 128*  --  133*   Potassium mmol/L 4.2  --  4.0   Chloride mmol/L 97  --  102   CO2 mmol/L 22*  --  22*   Glucose mg/dL 115*  --  235*   Glucose, UA   --  Negative  --    BUN, Bld mg/dL 18  --  19   Creatinine mg/dL 1.4  --  1.2   Albumin g/dL 3.1*  --  2.4*   Total Bilirubin mg/dL 2.2*  --  1.1*   Alkaline Phosphatase U/L 70  --  63   AST U/L 33  --  28   ALT U/L 40  --  34   Magnesium mg/dL 1.5*  --  2.1   Phosphorus mg/dL 2.6*  --  3.2       Drug levels  (last 3 results):  No results for input(s): VANCOMYCINRA, VANCOMYCINPE, VANCOMYCINTR in the last 72 hours.    Microbiologic Results:  Microbiology Results (last 7 days)     Procedure Component Value Units Date/Time    Blood culture x two cultures. Draw prior to antibiotics. [[id number]] Collected:  02/15/20 0918    Order Status:  Completed Specimen:  Blood from Peripheral, Forearm, Left Updated:  02/16/20 0605     Blood Culture, Routine Gram stain imelda bottle: Gram positive cocci in clusters resembling Staph       Positive results previously called 02/16/2020  06:05       Gram stain aer bottle: Gram positive cocci in clusters resembling Staph       02/16/2020  06:05    Narrative:       Aerobic and anaerobic    Blood culture x two cultures. Draw prior to antibiotics. [[id number]] Collected:  02/15/20 0930    Order Status:  Completed Specimen:  Blood from Peripheral, Forearm, Right Updated:  02/16/20 0605     Blood Culture, Routine Gram stain imelda bottle: Gram positive cocci in clusters resembling Staph       Results called to and read back by:Deyanira Nava RN 02/16/2020  06:04       Gram stain aer bottle: Gram positive cocci in clusters resembling Staph       02/16/2020  06:04    Narrative:       Aerobic and anaerobic    Urine Culture High Risk [[id number]] Collected:  02/15/20 1712    Order Status:  Sent Specimen:  Urine, Catheterized Updated:  02/15/20 1742    Urine culture [[id number]] Collected:  02/15/20 1145    Order Status:  No result Specimen:  Urine Updated:  02/15/20 1214    Influenza A & B by Molecular [[id number]] Collected:  02/15/20 0931    Order Status:  Completed Specimen:  Nasopharyngeal Swab Updated:  02/15/20 1035     Influenza A, Molecular Negative     Influenza B, Molecular Negative     Flu A & B Source Nasal swab

## 2020-02-16 NOTE — PLAN OF CARE
Reviewed with pt. And spouse his current plan of care, medicaion regimen and pain management. Pt. And spouse verbalized understanding of information given

## 2020-02-16 NOTE — PROGRESS NOTES
[facility] Hospital Medicine  Progress Note    Patient Name: Antonio Gross  MRN: [id number]  Patient Class: IP- Inpatient   Admission Date: 2/15/2020  Length of Stay: 1 days  Attending Physician: Lc Fabian MD  Primary Care Provider: Primary Doctor [facility] Medicine Team: [facility] D Lc Fabian MD    Subjective:     Principal Problem:Right ureteral stone    Interval History: Patient lying in bed, no acute distress. Reports mild right flank pain. Also reports hematuria after stent placement but no dysuria. Denies fever, chills, chest pain, shortness of breath, nausea or lower extremity swelling. Ambulating without difficulty.    Review of Systems   Constitutional: Positive for fatigue. Negative for chills and fever.   HENT: Negative for congestion.    Eyes: Negative for visual disturbance.   Respiratory: Negative for shortness of breath and wheezing.    Cardiovascular: Negative for chest pain and leg swelling.   Gastrointestinal: Negative for abdominal distention, abdominal pain, nausea and vomiting.   Genitourinary: Positive for flank pain and hematuria. Negative for dysuria.   Musculoskeletal: Negative for back pain.   Skin: Negative for wound.   Neurological: Positive for weakness. Negative for dizziness and headaches.   Psychiatric/Behavioral: Negative for confusion.     Objective:     Vital Signs (Most Recent):  Temp: 98.4 °F (36.9 °C) (02/16/20 0739)  Pulse: (!) 58 (02/16/20 0739)  Resp: 16 (02/16/20 0739)  BP: 127/62 (02/16/20 0739)  SpO2: 97 % (02/16/20 0739) Vital Signs (24h Range):  Temp:  [97.9 °F (36.6 °C)-102.5 °F (39.2 °C)] 98.4 °F (36.9 °C)  Pulse:  [54-97] 58  Resp:  [16-29] 16  SpO2:  [94 %-99 %] 97 %  BP: (102-151)/(53-77) 127/62     Weight: 86.2 kg (190 lb 0.6 oz)  Body mass index is 24.4 kg/m².    Intake/Output Summary (Last 24 hours) at 2/16/2020 2777  Last data filed at 2/16/2020 0520  Gross per 24 hour   Intake 600 ml   Output 900 ml    Net -300 ml      Physical Exam   Constitutional: He is oriented to person, place, and time. He appears well-developed and well-nourished.   HENT:   Head: Normocephalic and atraumatic.   Eyes: Pupils are equal, round, and reactive to light. Conjunctivae and EOM are normal.   Cardiovascular: Normal rate and regular rhythm.   Pulmonary/Chest: Effort normal and breath sounds normal. No respiratory distress.   Abdominal: Soft. Bowel sounds are normal. He exhibits no distension. There is tenderness. There is no guarding.   Musculoskeletal: Normal range of motion. He exhibits no edema.   Neurological: He is alert and oriented to person, place, and time. No cranial nerve deficit.   Skin: Skin is warm.   Psychiatric: He has a normal mood and affect.       Significant Labs:   A1C: No results for input(s): HGBA1C in the last 4320 hours.  Blood Culture:   Recent Labs   Lab 02/15/20  0918 02/15/20  0930   LABBLOO Gram stain imelda bottle: Gram positive cocci in clusters resembling Staph   Positive results previously called 02/16/2020  06:05   Gram stain aer bottle: Gram positive cocci in clusters resembling Staph   02/16/2020  06:05 Gram stain imelda bottle: Gram positive cocci in clusters resembling Staph   Results called to and read back by:Deyanira Nava RN 02/16/2020  06:04   Gram stain aer bottle: Gram positive cocci in clusters resembling Staph   02/16/2020  06:04     CBC:   Recent Labs   Lab 02/15/20  0919 02/16/20  0343   WBC 18.76* 8.29   HGB 13.1* 11.9*   HCT 37.6* 35.9*   * 73*     CMP:   Recent Labs   Lab 02/15/20  0919 02/16/20  0343   * 133*   K 4.2 4.0   CL 97 102   CO2 22* 22*   * 235*   BUN 18 19   CREATININE 1.4 1.2   CALCIUM 9.3 8.8   PROT 7.1 6.2   ALBUMIN 3.1* 2.4*   BILITOT 2.2* 1.1*   ALKPHOS 70 63   AST 33 28   ALT 40 34   ANIONGAP 9 9   EGFRNONAA 50.2* >60.0     Lactic Acid:   Recent Labs   Lab 02/15/20  0919 02/15/20  1329   LACTATE 1.7 1.1     Magnesium:   Recent Labs   Lab  02/15/20  0919 02/16/20  0343   MG 1.5* 2.1     Urine Culture: No results for input(s): LABURIN in the last 48 hours.  Urine Studies:   Recent Labs   Lab 02/15/20  1145   COLORU Pamella   APPEARANCEUA Hazy*   PHUR 5.0   SPECGRAV 1.020   PROTEINUA 1+*   GLUCUA Negative   KETONESU Negative   BILIRUBINUA Negative   OCCULTUA 3+*   NITRITE Negative   LEUKOCYTESUR 3+*   RBCUA 6*   WBCUA >100*   BACTERIA Occasional   HYALINECASTS 0       Significant Imaging: I have reviewed and interpreted all pertinent imaging results/findings within the past 24 hours.    Assessment/Plan:      Active Diagnoses:    Diagnosis Date Noted POA    PRINCIPAL PROBLEM:  Right ureteral stone [N20.1] 02/15/2020 Yes    Pyelonephritis [N12] 02/15/2020 Yes    Atrial fibrillation [I48.91] 02/15/2020 Yes    Current use of long term anticoagulation [Z79.01] 02/15/2020 Not Applicable    Essential hypertension [I10] 02/15/2020 Yes      Problems Resolved During this Admission:     Scheduled Meds:   amiodarone  100 mg Oral Daily    apixaban  5 mg Oral BID    cetirizine  5 mg Oral Daily    piperacillin-tazobactam (ZOSYN) IVPB  4.5 g Intravenous Q8H    sodium phosphate IVPB  20.01 mmol Intravenous Once    tamsulosin  0.4 mg Oral Daily    [START ON 2/17/2020] vancomycin (VANCOCIN) IVPB  1,250 mg Intravenous Q24H    vancomycin (VANCOCIN) IVPB  1,750 mg Intravenous Once     Continuous Infusions:   sodium chloride 0.9% 100 mL/hr (02/15/20 1831)     PRN Meds:.acetaminophen, Dextrose 10% Bolus, Dextrose 10% Bolus, glucagon (human recombinant), glucose, glucose, morphine, ondansetron, ondansetron, oxybutynin, oxyCODONE, promethazine (PHENERGAN) IVPB, senna-docusate 8.6-50 mg, sodium chloride 0.9%, Pharmacy to dose Vancomycin consult **AND** vancomycin - pharmacy to dose    PLAN:    Sepsis due to pyelonephritis of right kidney   Obstructing right proximal ureteral stone   Hydronephrosis of right kidney   - febrile, tachycpneic, normotensive, sating well on  room air  - WBC 18.8  - procal: 2.77  - lactate wnl  - CXR negative   - CT A/P showed obstructing 5 mm calculus at the right UPJ associated with mild hydronephrosis, renomegaly and perinephric stranding.   - U/A positive for UTI. followup ucx  - bcx growing GPC in clusters   - 2.5L NS bolus in ED  - continue with NS @ 100 cc/hr  - tylenol and oxycodone prn for pain   - urology consulted. apprec recs   -- s/p class A right ureteral stent placement on 2/15  -- continue IV zosyn (D1- 2/15). Started vancomycin (D1-2/16)  -- Will need to be discharged with 2 weeks of abx  -- Patient states he has an upcoming trip to [place] and will be returning on March 8. Will set up for right ureteroscopy upon his return.     Hyponatremia- improving   - NA on admit, 128 --> 133  - likely due to hypovolemia  - continue NS @ 100 cc/hr  - BMP daily      Renal insufficiency  - DDx: ALBERTO vs CKD  - Scr 1.4--> 1.2  - baseline creatinine not documented   - continue IVF  - BMP daily      Hypomagnesia   Hypophosphatemia   - replet prn      HTN  - hold home metoprolol and lisinopril due to normotensive   - resume antihypertensives when clinically appropriate      Atrial fibrillation   - resumed Eliquis after stent placement   - hold metoprolol for now due to sepsis. Resume when clinically appropriate      Right adrenal nodule  - Outpatient endocrinology followup     Allergies  - continue home antihistamines    VTE Risk Mitigation (From admission, onward)         Ordered     apixaban tablet 5 mg  2 times daily      02/15/20 2046     Place DRE hose  Until discontinued      02/15/20 1746     Place sequential compression device  Until discontinued      02/15/20 1746     IP VTE HIGH RISK PATIENT  Once      02/15/20 1702     Place sequential compression device  Until discontinued      02/15/20 1702     Place DRE hose  Until discontinued      02/15/20 1652              Time spent in care of patient : > 35 minutes     Lc Fabian MD  Department of  Hospital Medicine [facility]

## 2020-02-16 NOTE — PLAN OF CARE
Problem: Fall Injury Risk  Goal: Absence of Fall and Fall-Related Injury  Outcome: Ongoing, Progressing     Problem: Adult Inpatient Plan of Care  Goal: Plan of Care Review  Outcome: Ongoing, Progressing  Goal: Patient-Specific Goal (Individualization)  Outcome: Ongoing, Progressing  Goal: Absence of Hospital-Acquired Illness or Injury  Outcome: Ongoing, Progressing  Goal: Optimal Comfort and Wellbeing  Outcome: Ongoing, Progressing  Goal: Readiness for Transition of Care  Outcome: Ongoing, Progressing  Goal: Rounds/Family Conference  Outcome: Ongoing, Progressing       Plan of care communicated with patient. Pt verbalized understanding. VSS. Will continue to monitor.

## 2020-02-16 NOTE — PROGRESS NOTES
[facility]  Urology  Progress Note    Patient Name: Antonio Gross  MRN: [id number]  Admission Date: 2/15/2020  Hospital Length of Stay: 1 days  Code Status: Full Code   Attending Provider: Alejandor Anguiano MD  Primary Care Physician: Primary Doctor No    Subjective:     HPI:  69 y.o. male with a history of HTN, afib (on Eliquis), prostate cancer s/p RRP in 2013 who presents to the [facility] ED on 2/15/20 due to right pyelonephritis in the setting of a 6.3 mm right proximal ureteral stone with proximal hydronephrosis. Patient has had weakness and constant right flank pain which began 3 days ago. He had an episode of diarrhea 3 days ago and has had poor appetite since. He has been nauseated but has had no vomiting. He denies dysuria but endorses gross hematuria. No suprapubic pain. Denies UTI or stone history.    In the ED, he was febrile to 102.5. He has not been tachycardic or hypotensive. Lactate is normal. His WBC is elevated to 18.8 and his Cr is 1.4 (no baseline on file). Clean catch UA was nitrite negative and showed 6 RBC/hpf, >100 WBC/hpf, and occasional bacteria. He has received 4.5 g of Zosyn. CT abdomen/pelvis with contrast demonstrated mild right hydronephrosis secondary to a 5.3.x6.3 mm right proximal ureteral stone. He has a delayed nephrogram on the right. In addition, there is a 1.3 cm right adrenal nodule.    Interval History:   No acute events overnight  Denies pain  Mild hematuria but voiding well  Afebrile for 12 hours  Tolerating regular diet, no nausea  Blood cultures growing gram positive cocci    Review of Systems  Objective:     Temp:  [97.9 °F (36.6 °C)-102.5 °F (39.2 °C)] 98.4 °F (36.9 °C)  Pulse:  [54-97] 58  Resp:  [16-29] 16  SpO2:  [94 %-99 %] 97 %  BP: (102-151)/(53-77) 127/62     Body mass index is 24.4 kg/m².           Drains     None                 Physical Exam   Nursing note and vitals reviewed.  Constitutional: He is oriented to person, place, and time.  He appears well-developed and well-nourished. No distress.   HENT:   Head: Normocephalic and atraumatic.   Eyes: Pupils are equal, round, and reactive to light. Right eye exhibits no discharge. Left eye exhibits no discharge.   Cardiovascular: Normal rate and regular rhythm.    Pulmonary/Chest: Effort normal. No respiratory distress.   Abdominal: Soft. He exhibits no distension. There is no tenderness.   R CVA tenderness   Genitourinary:   Genitourinary Comments: Penis circumcised   Neurological: He is alert and oriented to person, place, and time.   Skin: Skin is warm. He is not diaphoretic.     Psychiatric: He has a normal mood and affect. His behavior is normal. Judgment and thought content normal.       Significant Labs:    BMP:  Recent Labs   Lab 02/15/20  0919 02/16/20  0343   * 133*   K 4.2 4.0   CL 97 102   CO2 22* 22*   BUN 18 19   CREATININE 1.4 1.2   CALCIUM 9.3 8.8       CBC:   Recent Labs   Lab 02/15/20  0919 02/16/20  0343   WBC 18.76* 8.29   HGB 13.1* 11.9*   HCT 37.6* 35.9*   * 73*       Blood Culture:   Recent Labs   Lab 02/15/20  0918 02/15/20  0930   LABBLOO Gram stain imelda bottle: Gram positive cocci in clusters resembling Staph   Positive results previously called 02/16/2020  06:05   Gram stain aer bottle: Gram positive cocci in clusters resembling Staph   02/16/2020  06:05 Gram stain imelda bottle: Gram positive cocci in clusters resembling Staph   Results called to and read back by:Deyanira Nava RN 02/16/2020  06:04   Gram stain aer bottle: Gram positive cocci in clusters resembling Staph   02/16/2020  06:04     Urine Studies:   Recent Labs   Lab 02/15/20  1145   COLORU Pamella   APPEARANCEUA Hazy*   PHUR 5.0   SPECGRAV 1.020   PROTEINUA 1+*   GLUCUA Negative   KETONESU Negative   BILIRUBINUA Negative   OCCULTUA 3+*   NITRITE Negative   LEUKOCYTESUR 3+*   RBCUA 6*   WBCUA >100*   BACTERIA Occasional   HYALINECASTS 0       Significant Imaging:  All pertinent imaging results/findings  from the past 24 hours have been reviewed.        Assessment/Plan:     * Right ureteral stone  69 y.o. male with a history of HTN, afib (on Eliquis), prostate cancer s/p RRP in 2013 who [place] [facility] [place] due to right pyelonephritis in the setting of a 6.3 mm right proximal ureteral stone with proximal hydronephrosis. S/P right ureteral stent placement on 2/15/20.    - Follow up urine and blood cultures  - Continue broad spectrum abx until cultures results  - Currently on Zosyn, would broaden given blood cultures from GPC, possibly staph  - Will need to be discharged with 2 weeks of abx  - Patient states he has an upcoming trip to [place] and will be returning on March 8. Will set up for right ureteroscopy upon his return.  - Further management per medicine team        VTE Risk Mitigation (From admission, onward)         Ordered     apixaban tablet 5 mg  2 times daily      02/15/20 2046     Place sequential compression device  Until discontinued      02/15/20 1746     IP VTE HIGH RISK PATIENT  Once      02/15/20 1702     Place sequential compression device  Until discontinued      02/15/20 1702     Place DRE hose  Until discontinued      02/15/20 1652                Marlena Comer MD  Urology  [facility]

## 2020-02-16 NOTE — ANESTHESIA POSTPROCEDURE EVALUATION
Anesthesia Post Evaluation    Patient: Antonio Gross    Procedure(s) Performed: Procedure(s) (LRB):  CYSTOSCOPY, WITH URETERAL STENT INSERTION (Right)    Final Anesthesia Type: general    Patient location during evaluation: PACU  Patient participation: Yes- Able to Participate  Level of consciousness: awake and alert  Post-procedure vital signs: reviewed and stable  Pain management: adequate  Airway patency: patent    PONV status at discharge: No PONV  Anesthetic complications: no      Cardiovascular status: blood pressure returned to baseline  Respiratory status: unassisted  Hydration status: euvolemic  Follow-up not needed.          Vitals Value Taken Time   /65 2/16/2020 11:59 AM   Temp 36.7 °C (98.1 °F) 2/16/2020 11:48 AM   Pulse 55 2/16/2020 11:59 AM   Resp 16 2/16/2020 11:48 AM   SpO2 98 % 2/16/2020 11:59 AM         Event Time     Out of Recovery 18:45:00          Pain/Zabrina Score: Pain Rating Prior to Med Admin: 2 (2/15/2020  6:31 PM)  Zabrina Score: 9 (2/15/2020  6:19 PM)

## 2020-02-16 NOTE — PROGRESS NOTES
Called report to floor nurse Angie still waiting on telemetry box to be delievered also still waiting for meds to be verified from pharmacy and let floor nurse know that patient still needs the meds due to me not being able to over ride meds and not verified by pharmacy.

## 2020-02-17 ENCOUNTER — TELEPHONE (OUTPATIENT)
Dept: UROLOGY | Facility: CLINIC | Age: 70
End: 2020-02-17

## 2020-02-17 PROBLEM — R78.81 BACTEREMIA DUE TO STAPHYLOCOCCUS: Status: ACTIVE | Noted: 2020-02-17

## 2020-02-17 PROBLEM — B95.8 BACTEREMIA DUE TO STAPHYLOCOCCUS: Status: ACTIVE | Noted: 2020-02-17

## 2020-02-17 LAB
ALBUMIN SERPL BCP-MCNC: 2.4 G/DL (ref 3.5–5.2)
ALP SERPL-CCNC: 76 U/L (ref 55–135)
ALT SERPL W/O P-5'-P-CCNC: 36 U/L (ref 10–44)
ANION GAP SERPL CALC-SCNC: 6 MMOL/L (ref 8–16)
ANISOCYTOSIS BLD QL SMEAR: SLIGHT
AST SERPL-CCNC: 33 U/L (ref 10–40)
BASOPHILS # BLD AUTO: 0.01 K/UL (ref 0–0.2)
BASOPHILS NFR BLD: 0.1 % (ref 0–1.9)
BILIRUB SERPL-MCNC: 0.8 MG/DL (ref 0.1–1)
BUN SERPL-MCNC: 29 MG/DL (ref 8–23)
CALCIUM SERPL-MCNC: 9.3 MG/DL (ref 8.7–10.5)
CHLORIDE SERPL-SCNC: 104 MMOL/L (ref 95–110)
CO2 SERPL-SCNC: 25 MMOL/L (ref 23–29)
CREAT SERPL-MCNC: 1.1 MG/DL (ref 0.5–1.4)
DIFFERENTIAL METHOD: ABNORMAL
EOSINOPHIL # BLD AUTO: 0 K/UL (ref 0–0.5)
EOSINOPHIL NFR BLD: 0 % (ref 0–8)
ERYTHROCYTE [DISTWIDTH] IN BLOOD BY AUTOMATED COUNT: 12.7 % (ref 11.5–14.5)
EST. GFR  (AFRICAN AMERICAN): >60 ML/MIN/1.73 M^2
EST. GFR  (NON AFRICAN AMERICAN): >60 ML/MIN/1.73 M^2
GLUCOSE SERPL-MCNC: 116 MG/DL (ref 70–110)
HCT VFR BLD AUTO: 36.5 % (ref 40–54)
HGB BLD-MCNC: 12 G/DL (ref 14–18)
HYPOCHROMIA BLD QL SMEAR: ABNORMAL
IMM GRANULOCYTES # BLD AUTO: 0.17 K/UL (ref 0–0.04)
IMM GRANULOCYTES NFR BLD AUTO: 1.3 % (ref 0–0.5)
LYMPHOCYTES # BLD AUTO: 0.7 K/UL (ref 1–4.8)
LYMPHOCYTES NFR BLD: 5.3 % (ref 18–48)
MAGNESIUM SERPL-MCNC: 1.9 MG/DL (ref 1.6–2.6)
MCH RBC QN AUTO: 34 PG (ref 27–31)
MCHC RBC AUTO-ENTMCNC: 32.9 G/DL (ref 32–36)
MCV RBC AUTO: 103 FL (ref 82–98)
MONOCYTES # BLD AUTO: 0.9 K/UL (ref 0.3–1)
MONOCYTES NFR BLD: 7.1 % (ref 4–15)
NEUTROPHILS # BLD AUTO: 11 K/UL (ref 1.8–7.7)
NEUTROPHILS NFR BLD: 86.2 % (ref 38–73)
NRBC BLD-RTO: 0 /100 WBC
OVALOCYTES BLD QL SMEAR: ABNORMAL
PHOSPHATE SERPL-MCNC: 2.9 MG/DL (ref 2.7–4.5)
PLATELET # BLD AUTO: 119 K/UL (ref 150–350)
PMV BLD AUTO: 10.5 FL (ref 9.2–12.9)
POIKILOCYTOSIS BLD QL SMEAR: SLIGHT
POLYCHROMASIA BLD QL SMEAR: ABNORMAL
POTASSIUM SERPL-SCNC: 4.1 MMOL/L (ref 3.5–5.1)
PROT SERPL-MCNC: 6.2 G/DL (ref 6–8.4)
RBC # BLD AUTO: 3.53 M/UL (ref 4.6–6.2)
SODIUM SERPL-SCNC: 135 MMOL/L (ref 136–145)
TROPONIN I SERPL DL<=0.01 NG/ML-MCNC: 0.19 NG/ML (ref 0–0.03)
WBC # BLD AUTO: 12.76 K/UL (ref 3.9–12.7)

## 2020-02-17 PROCEDURE — 99233 PR SUBSEQUENT HOSPITAL CARE,LEVL III: ICD-10-PCS | Mod: ,,, | Performed by: INTERNAL MEDICINE

## 2020-02-17 PROCEDURE — 63600175 PHARM REV CODE 636 W HCPCS: Performed by: INTERNAL MEDICINE

## 2020-02-17 PROCEDURE — 25000003 PHARM REV CODE 250: Performed by: PHYSICIAN ASSISTANT

## 2020-02-17 PROCEDURE — 84484 ASSAY OF TROPONIN QUANT: CPT

## 2020-02-17 PROCEDURE — 11000001 HC ACUTE MED/SURG PRIVATE ROOM

## 2020-02-17 PROCEDURE — 97116 GAIT TRAINING THERAPY: CPT

## 2020-02-17 PROCEDURE — 25000003 PHARM REV CODE 250: Performed by: INTERNAL MEDICINE

## 2020-02-17 PROCEDURE — 85025 COMPLETE CBC W/AUTO DIFF WBC: CPT

## 2020-02-17 PROCEDURE — 97165 OT EVAL LOW COMPLEX 30 MIN: CPT

## 2020-02-17 PROCEDURE — 87040 BLOOD CULTURE FOR BACTERIA: CPT

## 2020-02-17 PROCEDURE — 25000003 PHARM REV CODE 250: Performed by: HOSPITALIST

## 2020-02-17 PROCEDURE — 97530 THERAPEUTIC ACTIVITIES: CPT

## 2020-02-17 PROCEDURE — 25000003 PHARM REV CODE 250: Performed by: STUDENT IN AN ORGANIZED HEALTH CARE EDUCATION/TRAINING PROGRAM

## 2020-02-17 PROCEDURE — 97161 PT EVAL LOW COMPLEX 20 MIN: CPT

## 2020-02-17 PROCEDURE — 36415 COLL VENOUS BLD VENIPUNCTURE: CPT

## 2020-02-17 PROCEDURE — 83735 ASSAY OF MAGNESIUM: CPT

## 2020-02-17 PROCEDURE — 80053 COMPREHEN METABOLIC PANEL: CPT

## 2020-02-17 PROCEDURE — 84100 ASSAY OF PHOSPHORUS: CPT

## 2020-02-17 PROCEDURE — 99233 SBSQ HOSP IP/OBS HIGH 50: CPT | Mod: ,,, | Performed by: INTERNAL MEDICINE

## 2020-02-17 RX ORDER — HYDRALAZINE HYDROCHLORIDE 25 MG/1
25 TABLET, FILM COATED ORAL EVERY 8 HOURS PRN
Status: DISCONTINUED | OUTPATIENT
Start: 2020-02-17 | End: 2020-02-23 | Stop reason: HOSPADM

## 2020-02-17 RX ORDER — FUROSEMIDE 80 MG/1
80 TABLET ORAL DAILY PRN
COMMUNITY

## 2020-02-17 RX ORDER — LOPERAMIDE HYDROCHLORIDE 2 MG/1
2 CAPSULE ORAL 4 TIMES DAILY PRN
Status: DISCONTINUED | OUTPATIENT
Start: 2020-02-17 | End: 2020-02-23 | Stop reason: HOSPADM

## 2020-02-17 RX ORDER — HYDROXYZINE HYDROCHLORIDE 25 MG/1
25 TABLET, FILM COATED ORAL 3 TIMES DAILY PRN
Status: DISCONTINUED | OUTPATIENT
Start: 2020-02-17 | End: 2020-02-23 | Stop reason: HOSPADM

## 2020-02-17 RX ORDER — TALC
6 POWDER (GRAM) TOPICAL NIGHTLY PRN
Status: DISCONTINUED | OUTPATIENT
Start: 2020-02-17 | End: 2020-02-23 | Stop reason: HOSPADM

## 2020-02-17 RX ORDER — METOPROLOL SUCCINATE 50 MG/1
50 TABLET, EXTENDED RELEASE ORAL NIGHTLY
Status: DISCONTINUED | OUTPATIENT
Start: 2020-02-18 | End: 2020-02-23 | Stop reason: HOSPADM

## 2020-02-17 RX ORDER — METOPROLOL SUCCINATE 25 MG/1
25 TABLET, EXTENDED RELEASE ORAL DAILY
Status: DISCONTINUED | OUTPATIENT
Start: 2020-02-17 | End: 2020-02-17

## 2020-02-17 RX ADMIN — VANCOMYCIN HYDROCHLORIDE 1250 MG: 1.25 INJECTION, POWDER, LYOPHILIZED, FOR SOLUTION INTRAVENOUS at 04:02

## 2020-02-17 RX ADMIN — APIXABAN 5 MG: 5 TABLET, FILM COATED ORAL at 10:02

## 2020-02-17 RX ADMIN — PIPERACILLIN SODIUM AND TAZOBACTAM SODIUM 4.5 G: 4; .5 INJECTION, POWDER, LYOPHILIZED, FOR SOLUTION INTRAVENOUS at 05:02

## 2020-02-17 RX ADMIN — TAMSULOSIN HYDROCHLORIDE 0.4 MG: 0.4 CAPSULE ORAL at 09:02

## 2020-02-17 RX ADMIN — AMIODARONE HYDROCHLORIDE 100 MG: 100 TABLET ORAL at 09:02

## 2020-02-17 RX ADMIN — CANDESARTAN CILEXETIL 8 MG: 4 TABLET ORAL at 09:02

## 2020-02-17 RX ADMIN — APIXABAN 5 MG: 5 TABLET, FILM COATED ORAL at 09:02

## 2020-02-17 NOTE — TELEPHONE ENCOUNTER
----- Message from Freddie Winters MD sent at 2/15/2020  6:05 PM CST -----  Patient will need to follow-up with Dr. Anguiano in 2 weeks in cy[place] for right ureteroscopy with laser lithotripsy.    Thanks,  NAOMI Winters MD  Urology, PGY-2  Pager: [phone]

## 2020-02-17 NOTE — SUBJECTIVE & OBJECTIVE
Past Medical History:   Diagnosis Date    A-fib     Hypertension        Past Surgical History:   Procedure Laterality Date    CYSTOSCOPY W/ URETERAL STENT PLACEMENT Right 2/15/2020    Procedure: CYSTOSCOPY, WITH URETERAL STENT INSERTION;  Surgeon: Alejandro Anguiano MD;  Location: [facility] OR [address] [place] [address];  Service: Urology;  Laterality: Right;    PROSTATECTOMY         Review of patient's allergies indicates:  No Known Allergies    Medications:  Medications Prior to Admission   Medication Sig    candesartan (ATACAND) 8 MG tablet Take 8 mg by mouth once daily.    furosemide (LASIX) 80 MG tablet Take 80 mg by mouth daily as needed (pt takes 1/2 - 1 tab as needed for fluid).    amiodarone HCl (AMIODARONE ORAL) Take 100 mg by mouth once daily.     apixaban (ELIQUIS ORAL) Take 5 mg by mouth 2 (two) times daily.     metoprolol succinate (TOPROL-XL) 50 MG 24 hr tablet Take 50 mg by mouth once daily.     Antibiotics (From admission, onward)    Start     Stop Route Frequency Ordered    02/17/20 1530  vancomycin 1.25 g in dextrose 5% 250 mL IVPB (ready to mix)      -- IV Every 24 hours (non-standard times) 02/17/20 0801    02/17/20 0901  vancomycin - pharmacy to dose      -- IV pharmacy to manage frequency 02/17/20 0801        Antifungals (From admission, onward)    None        Antivirals (From admission, onward)    None             There is no immunization history on file for this patient.    Family History     Problem Relation (Age of Onset)    No Known Problems Mother, Father        Social History     Socioeconomic History    Marital status:      Spouse name: Not on file    Number of children: Not on file    Years of education: Not on file    Highest education level: Not on file   Occupational History    Not on file   Social Needs    Financial resource strain: Not on file    Food insecurity:     Worry: Not on file     Inability: Not on file    Transportation needs:     Medical: Not on file     Non-medical:  Not on file   Tobacco Use    Smoking status: Never Smoker    Smokeless tobacco: Never Used   Substance and Sexual Activity    Alcohol use: Yes    Drug use: No    Sexual activity: Not on file   Lifestyle    Physical activity:     Days per week: Not on file     Minutes per session: Not on file    Stress: Not on file   Relationships    Social connections:     Talks on phone: Not on file     Gets together: Not on file     Attends [employer] service: Not on file     Active member of club or organization: Not on file     Attends meetings of clubs or organizations: Not on file     Relationship status: Not on file   Other Topics Concern    Not on file   Social History Narrative    Not on file     Review of Systems   Constitutional: Negative for appetite change, chills, diaphoresis, fatigue and fever.   Eyes: Negative for visual disturbance.   Respiratory: Negative for cough and shortness of breath.    Cardiovascular: Negative for chest pain and leg swelling.   Gastrointestinal: Positive for abdominal pain. Negative for constipation, diarrhea, nausea and vomiting.   Genitourinary: Positive for dysuria and flank pain. Negative for difficulty urinating, frequency and hematuria.   Musculoskeletal: Negative for arthralgias, back pain and joint swelling.   Skin: Negative for color change, rash and wound.   Neurological: Positive for weakness. Negative for dizziness, numbness and headaches.   Psychiatric/Behavioral: Negative for agitation and confusion. The patient is not nervous/anxious.    All other systems reviewed and are negative.    Objective:     Vital Signs (Most Recent):  Temp: 98.7 °F (37.1 °C) (02/17/20 1142)  Pulse: 64 (02/17/20 1142)  Resp: 18 (02/17/20 0732)  BP: (!) 148/79 (02/17/20 1142)  SpO2: (!) 92 % (02/17/20 1142) Vital Signs (24h Range):  Temp:  [97.8 °F (36.6 °C)-98.8 °F (37.1 °C)] 98.7 °F (37.1 °C)  Pulse:  [50-67] 64  Resp:  [16-18] 18  SpO2:  [92 %-99 %] 92 %  BP: (123-157)/(64-79) 148/79      Weight: 86.2 kg (190 lb 0.6 oz)  Body mass index is 24.4 kg/m².    Estimated Creatinine Clearance: 71.6 mL/min (based on SCr of 1.1 mg/dL).    Physical Exam   Constitutional: He is oriented to person, place, and time. He appears well-developed and well-nourished. No distress.   HENT:   Head: Normocephalic and atraumatic.   Mouth/Throat: No oropharyngeal exudate.   Eyes: Conjunctivae are normal. No scleral icterus.   Cardiovascular: Normal rate and regular rhythm.   Pulmonary/Chest: Effort normal and breath sounds normal. No respiratory distress. He has no wheezes.   Abdominal: Soft. He exhibits no distension. There is tenderness (R).   Musculoskeletal: He exhibits no edema.   Neurological: He is alert and oriented to person, place, and time.   Skin: Skin is warm and dry. No rash noted. He is not diaphoretic.   Psychiatric: He has a normal mood and affect. His behavior is normal. Thought content normal.       Significant Labs: All pertinent labs within the past 24 hours have been reviewed.    Significant Imaging: I have reviewed all pertinent imaging results/findings within the past 24 hours.

## 2020-02-17 NOTE — CONSULTS
"Admission Medication Reconciliation - Pharmacy Consult Note    The home medication history was taken by Dinorah Felix CPhT.  Based on information gathered and subsequent review by the clinical pharmacist, the items below may need attention.     You may go to "Admission" then "Reconcile Home Medications" tabs to review and/or act upon these items.     Potentially problematic discrepancies with current MAR  o Patient IS NOT taking the following which was ordered upon admit  o Cetirizine 5 mg po daily      Please address this information as you see fit.  Feel free to contact us if you have any questions or require assistance.    Jayne Contreras, PharmD, BCPS  [phone]  "

## 2020-02-17 NOTE — PT/OT/SLP EVAL
Occupational Therapy   Evaluation and Discharge Note    Name: Antonio Gross  MRN: [id number]  Admitting Diagnosis:  Right ureteral stone 2 Days Post-Op    Recommendations:     Discharge Recommendations: home  Discharge Equipment Recommendations:  none  Barriers to discharge:  None    Assessment:     Antonio Gross is a 69 y.o. male with a medical diagnosis of Right ureteral stone. At this time, patient is functioning at their prior level of function and does not require further acute OT services.     Plan:     During this hospitalization, patient does not require further acute OT services.  Please re-consult if situation changes.    · Plan of Care Reviewed with: patient    Subjective     Chief Complaint: none  Patient/Family Comments/goals: to go home    Occupational Profile:  Patient lives in 2 story apt with wife with 1 NIRMAL and 1 flight of stairs within home with handrails. He has a walk-in shower with built-in chair. He was (I) PTA. Owns no DME. He works PT as an  and enjoys going to concerts.   Prior to admission, patients level of function was independent.  Equipment used at home: (built-in shower bench ).  DME owned (not currently used): none.  Upon discharge, patient will have assistance from wife.     Pain/Comfort:  · Pain Rating 1: 0/10  · Pain Rating Post-Intervention 1: 0/10    Patients cultural, spiritual, Protestant conflicts given the current situation: no    Objective:     Communicated with: VARUN prior to session.  Patient found sitting EOB with peripheral IV upon OT entry to room.    General Precautions: Standard,  fall  Orthopedic Precautions:N/A   Braces: N/A     Occupational Performance:      Functional Mobility/Transfers:  · Patient completed Sit <> Stand Transfer with independence  with  no assistive device   · Functional Mobility: Patient performed functional ambulation in room and in hallway simulating household mobility distances with no AD and  independently. No LOB noted. Patient had previously exited the bathroom prior to OT session as he showered independently. The floor was unsafe and completely wet/damp. Unsafe to perform transfer in the bathroom at the time of OT evaluation.     Activities of Daily Living:  · Bathing: independence per patient report in shower prior to OT session.  · Upper Body Dressing: independence per patient report. Patient declined donning back gown  · Lower Body Dressing: independence Donning socks sitting EOB  · Toileting: independence per patient report.     Cognitive/Visual Perceptual:  Cognitive/Psychosocial Skills:     -       Oriented to: Person, Place, Time and Situation   -       Follows Commands/attention:Follows multistep  commands  -       Communication: clear/fluent  -       Memory: No Deficits noted  -       Safety awareness/insight to disability: intact   -       Mood/Affect/Coping skills/emotional control: Appropriate to situation  Visual/Perceptual:      -Intact      Physical Exam:  Upper Extremity Range of Motion:     -       Right Upper Extremity: WFL  -       Left Upper Extremity: WFL  Upper Extremity Strength:    -       Right Upper Extremity: WFL  -       Left Upper Extremity: WFL   Strength:    -       Right Upper Extremity: WFL  -       Left Upper Extremity: WFL  Fine Motor Coordination:    -       Intact  Gross motor coordination:   WFL    AMPAC 6 Click ADL:  AMPAC Total Score: 24    Treatment & Education:  Role of OT and POC  ADL retraining  Functional mobility training  Safety  Discharge planning  Education:    Patient left sitting EOB with call button in reach and all needs met.    GOALS:   Multidisciplinary Problems     Occupational Therapy Goals        Problem: Occupational Therapy Goal    Goal Priority Disciplines Outcome Interventions   Occupational Therapy Goal     OT, PT/OT Ongoing, Progressing                    History:     Past Medical History:   Diagnosis Date    A-fib     Hypertension         Past Surgical History:   Procedure Laterality Date    CYSTOSCOPY W/ URETERAL STENT PLACEMENT Right 2/15/2020    Procedure: CYSTOSCOPY, WITH URETERAL STENT INSERTION;  Surgeon: Alejandro Anguiano MD;  Location: [facility] OR [address] [place] [address];  Service: Urology;  Laterality: Right;    PROSTATECTOMY         Time Tracking:     OT Date of Treatment: 02/17/20  OT Start Time: 1136  OT Stop Time: 1152  OT Total Time (min): 16 min    Billable Minutes:Evaluation 8  Therapeutic Activity 8    LEONARD Alonzo  2/17/2020

## 2020-02-17 NOTE — CONSULTS
[facility]  Infectious Disease  Consult Note    Patient Name: Antonio Gross  MRN: [id number]  Admission Date: 2/15/2020  Hospital Length of Stay: 2 days  Attending Physician: Lc Fabian MD  Primary Care Provider: Primary Doctor No     Isolation Status: No active isolations    Patient information was obtained from patient and past medical records.      Inpatient consult to Infectious Diseases  Consult performed by: Belinda Bean PA-C  Consult ordered by: Lc Fabian MD        Assessment/Plan:     Bacteremia due to Staphylococcus     69 year-old male admitted with sepsis found to have right sided obstructing ureteral stone s/p stent placement on 2/15; now with Staph aureus in the blood and urine. His fevers resolved on Vanc/Zosyn though still with abdominal pain, dysuria and hematuria. Also reports episodes of bowel incontinence today. ID consulted for antibiotic recommendations.      Staph aureus usually seeds the urine through hematogenous spread. Will need further evaluation.           Plan  - Continue Vancomycin. Vanc trough goal 15-20. Inpatient pharmacy managing dosing.  - Discontinue Zosyn  - Follow up Staph susceptibilities. Repeat blood cultures ordered  - Recommend at minimum 2D echo for evaluation of endocarditis  - If he continues to experience bowel incontinence, recommend spine imaging  - ID will follow.        Thank you for the consult. Please call for any questions.  Belinda Bean PA-C  Phone: [phone]  Pager: [phone]    Subjective:     Principal Problem: Right ureteral stone    HPI: Mr. Gloria is a 69 year old gentleman with PMH of HTN, A fib, prostate cancer s/p RRP in 2013 who presented to [facility] with a 3 day history of generalized weakness, fever and decreased po intake. He also notes right flank and lower abdominal pain. He denies chills, nausea, vomiting, chest pain, shortness of breath or lower extremity swelling.  No diarrhea.     In the ED, he  was febrile to 102.5F, tachypneic. WBC elevated at 18.8 and SCr was 1.4. Lactate normal. U/A showed > 100 WBC. CXR showed atelectasis without evidence of infection. CT A/P with contrast  revealed an obstructing calculus at the right ureteral pelvic junction measuring 5 mm associated with mild right hydronephrosis, renomegaly, and perinephric stranding. Urology consulted and patient is now s/p right ureteral stent placement on 2/15. Per op note, purulent drainage was seen.    Admission blood cultures are positive for 4/4 bottles with Staph species. Urine cx with CONS. Repeat urine cx 2/15 obtained during procedure with multiple organisms isolated. He denies recent newman placement or instrumentation prior to admission. No recent procedures or dental work. No hardware.    He has been on Vanc and Zosyn. Fevers resolved. WBC trending down. Abdominal pain stable. Reports episode of incontinence today.    The patient and his wife (professor at [employer]) live [facility]. Reports recent diagnosis of A fib and HF. Had a few procedures in 2018 and 2019 related to this, including cardioversion.    Past Medical History:   Diagnosis Date    A-fib     Hypertension        Past Surgical History:   Procedure Laterality Date    CYSTOSCOPY W/ URETERAL STENT PLACEMENT Right 2/15/2020    Procedure: CYSTOSCOPY, WITH URETERAL STENT INSERTION;  Surgeon: Alejandro Anguiano MD;  Location: [facility] OR [address] [place] [address];  Service: Urology;  Laterality: Right;    PROSTATECTOMY         Review of patient's allergies indicates:  No Known Allergies    Medications:  Medications Prior to Admission   Medication Sig    candesartan (ATACAND) 8 MG tablet Take 8 mg by mouth once daily.    furosemide (LASIX) 80 MG tablet Take 80 mg by mouth daily as needed (pt takes 1/2 - 1 tab as needed for fluid).    amiodarone HCl (AMIODARONE ORAL) Take 100 mg by mouth once daily.     apixaban (ELIQUIS ORAL) Take 5 mg by mouth 2 (two) times daily.      metoprolol succinate (TOPROL-XL) 50 MG 24 hr tablet Take 50 mg by mouth once daily.     Antibiotics (From admission, onward)    Start     Stop Route Frequency Ordered    02/17/20 1530  vancomycin 1.25 g in dextrose 5% 250 mL IVPB (ready to mix)      -- IV Every 24 hours (non-standard times) 02/17/20 0801    02/17/20 0901  vancomycin - pharmacy to dose      -- IV pharmacy to manage frequency 02/17/20 0801        Antifungals (From admission, onward)    None        Antivirals (From admission, onward)    None             There is no immunization history on file for this patient.    Family History     Problem Relation (Age of Onset)    No Known Problems Mother, Father        Social History     Socioeconomic History    Marital status:      Spouse name: Not on file    Number of children: Not on file    Years of education: Not on file    Highest education level: Not on file   Occupational History    Not on file   Social Needs    Financial resource strain: Not on file    Food insecurity:     Worry: Not on file     Inability: Not on file    Transportation needs:     Medical: Not on file     Non-medical: Not on file   Tobacco Use    Smoking status: Never Smoker    Smokeless tobacco: Never Used   Substance and Sexual Activity    Alcohol use: Yes    Drug use: No    Sexual activity: Not on file   Lifestyle    Physical activity:     Days per week: Not on file     Minutes per session: Not on file    Stress: Not on file   Relationships    Social connections:     Talks on phone: Not on file     Gets together: Not on file     Attends Spiritism service: Not on file     Active member of club or organization: Not on file     Attends meetings of clubs or organizations: Not on file     Relationship status: Not on file   Other Topics Concern    Not on file   Social History Narrative    Not on file     Review of Systems   Constitutional: Negative for appetite change, chills, diaphoresis, fatigue and fever.   Eyes:  Negative for visual disturbance.   Respiratory: Negative for cough and shortness of breath.    Cardiovascular: Negative for chest pain and leg swelling.   Gastrointestinal: Positive for abdominal pain. Negative for constipation, diarrhea, nausea and vomiting.   Genitourinary: Positive for dysuria, hematuria and flank pain. Negative for difficulty urinating and frequency.  Musculoskeletal: Negative for arthralgias, back pain and joint swelling.   Skin: Negative for color change, rash and wound.   Neurological: Positive for weakness. Negative for dizziness, numbness and headaches.   Psychiatric/Behavioral: Negative for agitation and confusion. The patient is not nervous/anxious.    All other systems reviewed and are negative.    Objective:     Vital Signs (Most Recent):  Temp: 98.7 °F (37.1 °C) (02/17/20 1142)  Pulse: 64 (02/17/20 1142)  Resp: 18 (02/17/20 0732)  BP: (!) 148/79 (02/17/20 1142)  SpO2: (!) 92 % (02/17/20 1142) Vital Signs (24h Range):  Temp:  [97.8 °F (36.6 °C)-98.8 °F (37.1 °C)] 98.7 °F (37.1 °C)  Pulse:  [50-67] 64  Resp:  [16-18] 18  SpO2:  [92 %-99 %] 92 %  BP: (123-157)/(64-79) 148/79     Weight: 86.2 kg (190 lb 0.6 oz)  Body mass index is 24.4 kg/m².    Estimated Creatinine Clearance: 71.6 mL/min (based on SCr of 1.1 mg/dL).    Physical Exam   Constitutional: He is oriented to person, place, and time. He appears well-developed and well-nourished. No distress.   HENT:   Head: Normocephalic and atraumatic.   Mouth/Throat: No oropharyngeal exudate.   Eyes: Conjunctivae are normal. No scleral icterus.   Cardiovascular: Normal rate and regular rhythm.   Pulmonary/Chest: Effort normal and breath sounds normal. No respiratory distress. He has no wheezes.   Abdominal: Soft. He exhibits no distension. There is tenderness (R).   Musculoskeletal: He exhibits no edema.   Neurological: He is alert and oriented to person, place, and time.   Skin: Skin is warm and dry. No rash noted. He is not diaphoretic.  Hyperpigmentation BLE. Scattered lesions.  Psychiatric: He has a normal mood and affect. His behavior is normal. Thought content normal.       Significant Labs: All pertinent labs within the past 24 hours have been reviewed.    Significant Imaging: I have reviewed all pertinent imaging results/findings within the past 24 hours.

## 2020-02-17 NOTE — NURSING
Patient family called nurse into room to page Dr. Fabian to come to the bedside for the third time today.  The are concerned with his SBP in the 150's.  Again, patient has refused to take him morning Metoprolol, which was explained to the family.  RN strained urine, NO STONES were found.  Melatonin PRN.  Delirium precautions placed, SIGN PLACED ON DOOR. Patient and family both updated.

## 2020-02-17 NOTE — PLAN OF CARE
02/16/2020      Antonio Gross  [address] [address]          [facility] Medicine Dept.  [facility] [address] [id number] [phone] after hours  [phone] fax Antonio Gross has been hospitalized at the [facility] since 2/15/2020. Due to Mr. Gloria's severe medical condition, his family had to cancel their flight arrangements in order to be with him during this hospitalization.    Please contact me if you have any questions.                  __________________________  Lc Fabian MD  02/16/2020

## 2020-02-17 NOTE — NURSING
Patient and family concerned about patient not having Melatonin available tonight prn.  RN reassured that he does have Melatonin PRN ordered, but patient would have to ask for it.  RN also reminded them that patient has PRN Imodium.  Patient's wife extremely concerned, once again, that patient would be interrupted from his sleep overnight.  RN educated patient on delirium precautions for the third time.

## 2020-02-17 NOTE — TELEPHONE ENCOUNTER
----- Message from Marlena Comer MD sent at 2/16/2020  8:53 AM CST -----  Can we please set this patient up with Dr. Anguiano for a right ureteroscopy.  He is going out of town until March 8 so would like it to be done following this.     Thanks,  Marlena

## 2020-02-17 NOTE — NURSING
"Patient extremely anxious and fixated on his cardiac medications.  Due to bradycardia, patient's Metoprolol was decrease to 25 mg PO daily.  Patient refusing to take this medication in the morning, stating, "I take that at night because that is when it works best."  "

## 2020-02-17 NOTE — NURSING
Patient c/o of bloody urine and pain upon urination.  Notified Dr. Fabina.  Will continue to monitor.  Patient currently afebrile.

## 2020-02-17 NOTE — ASSESSMENT & PLAN NOTE
69 year-old male admitted with sepsis found to have right sided obstructing ureteral stone s/p stent placement on 2/15; now with Staph aureus in the blood and urine. His fevers resolved on Vanc/Zosyn though still with abdominal pain. Also reports episodes of bowel incontinence today. ID consulted for antibiotic recommendations.      Staph aureus usually seeds the urine through hematogenous spread. Will need further evaluation.           Plan  - Continue Vancomycin. Vanc trough goal 15-20. Inpatient pharmacy managing dosing.  - Discontinue Zosyn  - Follow up Staph susceptibilities. Repeat blood cultures ordered  - Recommend at minimum 2D echo for evaluation of endocarditis  - If he continues to experience bowel incontinence, recommend spine imaging  - ID will follow.

## 2020-02-17 NOTE — NURSING
Patient refusing Pipercillin. Stating that ID told him it was discontinued. Contacted ID for clarification, ID will discontinue.

## 2020-02-17 NOTE — PLAN OF CARE
Problem: Occupational Therapy Goal  Goal: Occupational Therapy Goal  Outcome: Ongoing, Progressing   Evaluation/Discharge only. No acute OT needs at this time. No goals established. Re-consult if situation changes.    LEONARD Alonzo  2/17/2020

## 2020-02-17 NOTE — NURSING
Dr. Fabian called patient's wife, Lorna, to update family on plan of care.  Hydralazine SBP > 160 ordered PRN. Family understands and is aware.

## 2020-02-17 NOTE — PLAN OF CARE
Problem: Physical Therapy Goal  Goal: Physical Therapy Goal  Description  PT evaluation completed    Outcome: Met   Initial evaluation completed. Patient tolerated assessment well. Established POC and goals. Patient would continue to benefit from skilled PT services in order to improve functional mobility independence.       Catrina Harman, PT, DPT  2/17/2020

## 2020-02-17 NOTE — PT/OT/SLP EVAL
"Physical Therapy Evaluation and Discharge Note    Patient Name:  Antonio Gross   MRN:  [id number]    Recommendations:     Discharge Recommendations:  home   Discharge Equipment Recommendations: none   Barriers to discharge: None    Assessment:     Antonio Gross is a 69 y.o. male admitted with a medical diagnosis of Right ureteral stone. .  At this time, patient is functioning at their prior level of function and does not require further acute PT services.     Recent Surgery: Procedure(s) (LRB):  CYSTOSCOPY, WITH URETERAL STENT INSERTION (Right) 2 Days Post-Op    Plan:     During this hospitalization, patient does not require further acute PT services.  Please re-consult if situation changes.      Subjective     Chief Complaint: none  Patient/Family Comments/goals: "But how do you know when I can go home."  Pain/Comfort:  · Pain Rating 1: 0/10  · Pain Rating Post-Intervention 1: 0/10    Patients cultural, spiritual, Orthodox conflicts given the current situation: no    Living Environment:  Patient lives in [address] [facility] with wife with 1 NIRMAL and 1 flight of stairs within home with handrails. He has a walk-in shower with built-in chair. He was (I) PTA. Owns no DME. He works PT as an  and enjoys going to concerts.   Prior to admission, patients level of function was independent.  Equipment used at home: (built-in shower bench ).  DME owned (not currently used): none.  Upon discharge, patient will have assistance from wife.    Objective:     Communicated with RN prior to session.  Patient found sitting EOB  with peripheral IV upon PT entry to room.    General Precautions: Standard, fall   Orthopedic Precautions:N/A   Braces: N/A     Exams:  · Gross Motor Coordination:  WFL  · Postural Exam:  Patient presented with the following abnormalities:    · -       Rounded shoulders  · -       Forward head  · Sensation:    · -       Intact  light/touch BLE  · RLE ROM: WFL  · RLE Strength: " WFL  · LLE ROM: WFL  · LLE Strength: WFL    Functional Mobility:  · Bed Mobility:     · Scooting: independence  · Transfers:     · Sit to Stand:  independence with no AD  · Gait: ~110ft with (I), slow gait speed, no LOB or gait deviations   · Balance: good throughout; patient also reported that he had previously takne a shower (I)     AM-PAC 6 CLICK MOBILITY  Total Score:24       Therapeutic Activities and Exercises:   Patient educated on role and goal of PT   White board updated   Questions and concerns answered within PT scope     AM-PAC 6 CLICK MOBILITY  Total Score:24     Patient left sitting EOB  with all lines intact, call button in reach and RN notified.    GOALS:   Multidisciplinary Problems     Physical Therapy Goals     Not on file          Multidisciplinary Problems (Resolved)        Problem: Physical Therapy Goal    Goal Priority Disciplines Outcome Goal Variances Interventions   Physical Therapy Goal   (Resolved)     PT, PT/OT Met     Description:  PT evaluation completed                     History:     Past Medical History:   Diagnosis Date    A-fib     Hypertension        Past Surgical History:   Procedure Laterality Date    CYSTOSCOPY W/ URETERAL STENT PLACEMENT Right 2/15/2020    Procedure: CYSTOSCOPY, WITH URETERAL STENT INSERTION;  Surgeon: Alejandro Anguiano MD;  Location: [facility] OR [address] [place] [address];  Service: Urology;  Laterality: Right;    PROSTATECTOMY         Time Tracking:     PT Received On: 02/17/20  PT Start Time: 1136     PT Stop Time: 1152  PT Total Time (min): 16 min     Billable Minutes: Evaluation 8 and Gait Training 8      Catrina Harman, PT  02/17/2020

## 2020-02-17 NOTE — HPI
Mr. Gloria is a 69 year old gentleman with PMH of HTN, A fib, prostate cancer s/p RRP in 2013 who presented to [facility] with a 3 day history of generalized weakness, fever and decreased po intake. He also notes right flank and lower abdominal pain. He denies chills, nausea, vomiting, chest pain, shortness of breath or lower extremity swelling. No diarrhea.     In the ED, he was febrile to 102.5F, tachypneic. WBC elevated at 18.8 and SCr was 1.4. Lactate normal. U/A showed > 100 WBC. CXR showed atelectasis without evidence of infection. CT A/P with contrast revealed an obstructing calculus at the right ureteral pelvic junction measuring 5 mm associated with mild right hydronephrosis, renomegaly, and perinephric stranding. Urology consulted and patient is now s/p right ureteral stent placement on 2/15. Per op note, purulent drainage was seen.    Admission blood cultures are positive for 4/4 bottles with Staph species. Urine cx with CONS. Repeat urine cx 2/15 obtained during procedure with multiple organisms isolated. He denies recent newman placement or instrumentation prior to admission. No recent procedures or dental work. No hardware.    He has been on Vanc and Zosyn. Fevers resolved. WBC trending down. Abdominal pain stable. Reports episode of incontinence today.    The patient and his wife (professor at [employer]) live between [facility]. Reports recent diagnosis of A fib and HF. Had a few procedures in 2018 and 2019 related to this, including cardioversion. Denies cuts/abrasions thugh does have hyperpigmentation of lower extremities and scrathces frequently.

## 2020-02-17 NOTE — PROGRESS NOTES
Urology Progress Note    - Patient is growing coag negative staph and GPC in clusters in blood.  - He is improving clinically on vanc/zosyn.  - Called micro lab this AM who will run sensitivities on coag negative staph in urine which should take a few days.  - Follow-up cultures and tailor abx to susceptibilities.  - Will set up for outpatient ureteroscopy once infection is adequately treated to definitively treat ureteral stone.  - Urology will sign off at this time. Please call with questions.    NAOMI Winters MD  Urology, PGY-2  Pager: [phone]

## 2020-02-17 NOTE — PLAN OF CARE
Pt remains free from falls and injury. Pt makes statement of no pain. Strained urine with no results. Pt did not want to receive IV antibiotic at night, stating he needed to sleep. Pt informed that the antibiotics needed to be given as scheduled in order to be effective, after several times explaining this to pt. He allowed antibiotic to be given. This morning pt stated he did not want any treatment at night. I encouraged the pt to discuss this with the team when they round today. Bed low and locked, Call light within reach.

## 2020-02-17 NOTE — PROGRESS NOTES
Pharmacokinetic Assessment Follow Up: IV Vancomycin    Vancomycin incidentally discontinued; patient received vancomycin 1750 mg IV x1 dose yesterday.    Vancomycin Regimen Plan:  1.  Continue with vancomycin 1250 mg IV Q24H  2.  Obtain trough 2/18 @ 1500, goal trough 15-20 mcg/mL  3.  Continue to monitor RF and make adjustments as needed    Pharmacy will continue to follow and monitor vancomycin.    Thank you for the consult,   Jayne Contreras, PharmD, BCPS  [phone]

## 2020-02-17 NOTE — PROGRESS NOTES
[facility] Hospital Medicine  Progress Note    Patient Name: Antonio Gross  MRN: [id number]  Patient Class: IP- Inpatient   Admission Date: 2/15/2020  Length of Stay: 2 days  Attending Physician: Lc Fabian MD  Primary Care Provider: Primary Doctor [facility] Medicine Team: [facility] MED D Lc Fabian MD    Subjective:     Principal Problem:Right ureteral stone    Interval History: Patient sitting in chair, appear mildly anxious. Reports continued right flank pain 3/10 in severity and continued hematuria. Denies fever, chills, chest pain, shortness of breath.     Review of Systems   Constitutional: Positive for fatigue. Negative for chills and fever.   HENT: Negative for congestion.    Eyes: Negative for visual disturbance.   Respiratory: Negative for shortness of breath and wheezing.    Cardiovascular: Negative for chest pain and leg swelling.   Gastrointestinal: Negative for abdominal distention, abdominal pain, nausea and vomiting.   Genitourinary: Positive for flank pain and hematuria. Negative for dysuria.   Musculoskeletal: Negative for back pain.   Skin: Negative for wound.   Neurological: Positive for weakness. Negative for dizziness and headaches.   Psychiatric/Behavioral: Negative for confusion.     Objective:     Vital Signs (Most Recent):  Temp: 98.2 °F (36.8 °C) (02/17/20 0732)  Pulse: (!) 58 (02/17/20 0732)  Resp: 18 (02/17/20 0732)  BP: (!) 157/77 (02/17/20 0732)  SpO2: 99 % (02/17/20 0732) Vital Signs (24h Range):  Temp:  [97.8 °F (36.6 °C)-98.8 °F (37.1 °C)] 98.2 °F (36.8 °C)  Pulse:  [50-67] 58  Resp:  [16-18] 18  SpO2:  [97 %-99 %] 99 %  BP: (107-157)/(57-77) 157/77     Weight: 86.2 kg (190 lb 0.6 oz)  Body mass index is 24.4 kg/m².    Intake/Output Summary (Last 24 hours) at 2/17/2020 0810  Last data filed at 2/17/2020 0600  Gross per 24 hour   Intake 600 ml   Output 750 ml   Net -150 ml      Physical Exam   Constitutional: He is oriented to  person, place, and time. He appears well-developed and well-nourished.   HENT:   Head: Normocephalic and atraumatic.   Eyes: Pupils are equal, round, and reactive to light. Conjunctivae and EOM are normal.   Cardiovascular: Normal rate and regular rhythm.   Pulmonary/Chest: Effort normal and breath sounds normal. No respiratory distress.   Abdominal: Soft. Bowel sounds are normal. He exhibits no distension. There is tenderness. There is no guarding.   Musculoskeletal: Normal range of motion. He exhibits no edema.   Neurological: He is alert and oriented to person, place, and time. No cranial nerve deficit.   Skin: Skin is warm.   Psychiatric: He has a normal mood and affect.       Significant Labs:   A1C:   Recent Labs   Lab 02/16/20  0502   HGBA1C 4.3     Blood Culture:   Recent Labs   Lab 02/15/20  0918 02/15/20  0930   LABBLOO Gram stain imelda bottle: Gram positive cocci in clusters resembling Staph   Positive results previously called 02/16/2020  06:05   Gram stain aer bottle: Gram positive cocci in clusters resembling Staph   02/16/2020  06:05 Gram stain imelda bottle: Gram positive cocci in clusters resembling Staph   Results called to and read back by:Deyanira Nava RN 02/16/2020  06:04   Gram stain aer bottle: Gram positive cocci in clusters resembling Staph   02/16/2020  06:04     CBC:   Recent Labs   Lab 02/15/20  0919 02/16/20  0343 02/17/20  0351   WBC 18.76* 8.29 12.76*   HGB 13.1* 11.9* 12.0*   HCT 37.6* 35.9* 36.5*   * 73* 119*     CMP:   Recent Labs   Lab 02/15/20  0919 02/16/20  0343 02/17/20  0351   * 133* 135*   K 4.2 4.0 4.1   CL 97 102 104   CO2 22* 22* 25   * 235* 116*   BUN 18 19 29*   CREATININE 1.4 1.2 1.1   CALCIUM 9.3 8.8 9.3   PROT 7.1 6.2 6.2   ALBUMIN 3.1* 2.4* 2.4*   BILITOT 2.2* 1.1* 0.8   ALKPHOS 70 63 76   AST 33 28 33   ALT 40 34 36   ANIONGAP 9 9 6*   EGFRNONAA 50.2* >60.0 >60.0     Lactic Acid:   Recent Labs   Lab 02/15/20  0919 02/15/20  1329   LACTATE 1.7 1.1      Magnesium:   Recent Labs   Lab 02/15/20  0919 02/16/20  0343 02/17/20  0351   MG 1.5* 2.1 1.9     Urine Culture:   Recent Labs   Lab 02/15/20  1145 02/15/20  1712   LABURIN COAGULASE-NEGATIVE STAPHYLOCOCCUS SPECIES  50,000 - 99,999 cfu/ml  Identification and susceptibility pending  * Multiple organisms isolated. None in predominance.  Repeat if  clinically necessary.     Urine Studies:   Recent Labs   Lab 02/15/20  1145   COLORU Pamella   APPEARANCEUA Hazy*   PHUR 5.0   SPECGRAV 1.020   PROTEINUA 1+*   GLUCUA Negative   KETONESU Negative   BILIRUBINUA Negative   OCCULTUA 3+*   NITRITE Negative   LEUKOCYTESUR 3+*   RBCUA 6*   WBCUA >100*   BACTERIA Occasional   HYALINECASTS 0       Significant Imaging: I have reviewed and interpreted all pertinent imaging results/findings within the past 24 hours.    Assessment/Plan:      Active Diagnoses:    Diagnosis Date Noted POA    PRINCIPAL PROBLEM:  Right ureteral stone [N20.1] 02/15/2020 Yes    Pyelonephritis [N12] 02/15/2020 Yes    Atrial fibrillation [I48.91] 02/15/2020 Yes    Current use of long term anticoagulation [Z79.01] 02/15/2020 Not Applicable    Essential hypertension [I10] 02/15/2020 Yes      Problems Resolved During this Admission:     Scheduled Meds:   amiodarone  100 mg Oral Daily    apixaban  5 mg Oral BID    candesartan  8 mg Oral Daily    cetirizine  5 mg Oral Daily    metoprolol succinate  25 mg Oral Daily    piperacillin-tazobactam (ZOSYN) IVPB  4.5 g Intravenous Q8H    sodium phosphate IVPB  20.01 mmol Intravenous Once    tamsulosin  0.4 mg Oral Daily    vancomycin (VANCOCIN) IVPB  1,250 mg Intravenous Q24H     Continuous Infusions:   sodium chloride 0.9% 100 mL/hr (02/15/20 1831)     PRN Meds:.acetaminophen, Dextrose 10% Bolus, Dextrose 10% Bolus, glucagon (human recombinant), glucose, glucose, morphine, ondansetron, ondansetron, oxybutynin, oxyCODONE, promethazine (PHENERGAN) IVPB, senna-docusate 8.6-50 mg, sodium chloride 0.9%,  Pharmacy to dose Vancomycin consult **AND** vancomycin - pharmacy to dose    PLAN:    Sepsis due to pyelonephritis of right kidney   Obstructing right proximal ureteral stone   Hydronephrosis of right kidney   - febrile, tachycpneic, normotensive, sating well on room air  - WBC 18.8 --> 12  - procal: 2.77  - lactate wnl  - CXR negative   - CT A/P showed obstructing 5 mm calculus at the right UPJ associated with mild hydronephrosis, renomegaly and perinephric stranding.   - U/A positive for UTI.  - ucx growing staph aureus   - bcx growing staph aureus   - awaiting sensitivities   - ID consulted. apprec  -- Continue Vancomycin. Vanc trough goal 15-20. Inpatient pharmacy managing dosing.  -- Discontinue Zosyn on 2/17  -- Follow up Staph susceptibilities. Repeat blood cultures ordered  -- followup 2D echo for evaluation of endocarditis  -- If he continues to experience bowel incontinence, recommend spine imaging  - urology consulted. apprec recs   -- s/p class A right ureteral stent placement on 2/15  -- Patient states he has an upcoming trip to [place] and will be returning on March 8. Will set up for right ureteroscopy upon his return.     Hyponatremia- resolved   - NA on admit, 128   - likely due to hypovolemia  - s/p NS @ 100 cc/hr  - BMP daily      Acute renal insufficiency- resolved   - DDx: ALBERTO vs CKD  - Scr 1.4--> 1.1  - baseline creatinine not documented   - s/p IVF  - BMP daily      Hypomagnesia   Hypophosphatemia   - replet prn      HTN  - resumed home metoprolol nightly and candesartan daily      Atrial fibrillation   - on home Eliquis and metoprolol       Right adrenal nodule  - Outpatient endocrinology followup     Allergies  - continue home antihistamines    VTE Risk Mitigation (From admission, onward)         Ordered     apixaban tablet 5 mg  2 times daily      02/15/20 2046     Place sequential compression device  Until discontinued      02/15/20 1746     IP VTE HIGH RISK PATIENT  Once      02/15/20 1702      Place sequential compression device  Until discontinued      02/15/20 1702     Place DRE hose  Until discontinued      02/15/20 1652              Time spent in care of patient : > 35 minutes     Lc Fabian MD  Department of Hospital Medicine   [facility]

## 2020-02-17 NOTE — PROGRESS NOTES
Therapy with vancomycin complete and/or consult discontinued by provider.  Pharmacy will sign off, please re-consult as needed.    Jayne Contreras, PharmD, BCPS  [id number]

## 2020-02-17 NOTE — PLAN OF CARE
02/16/2020      Antonio Gross  [address] [address]          [facility] Medicine Dept.  [facility] [address] [facility] [address] [phone] after hours  [phone] fax Antonio Gross has been hospitalized at the [facility] since 2/15/2020.  Due to Mr. Gloria's severe medical condition, his family had to cancel their flight arrangements in order to be with him during this hospitalization. The patient's anticipated discharge is undetermined at this time but will likely occur within one week so family has been advised to make scheduling adjustments accordingly.      Please contact me if you have any questions.                  __________________________  Lc Fabian MD  02/16/2020

## 2020-02-17 NOTE — NURSING
"Patient and patient's wife both remain visibly anxious.  RN offered Imodium, as patient concerned about diarrhea.  When RN brought medication to room, patient stated, "no I don't want that I want to wait until I have the diarrhea."  Patient's SBP in the 150s, which is unchanged.  Patient's wife stated, "so what are we going to do about this blood pressure because it's too high."  RN explained that patient received his daily blood pressure medications, but what was insistent on not taking daily Metoprolol because he takes his medications at night.  Patient's wife verbally expressed unhappiness with housekeeping and night staff stating, "no one is to come into this room overnight because my  is getting confused, do you understand?" Delirium precautions placed, sign will be made and hung on room.  "Do not disturb between 10 pm and 5 am."  "

## 2020-02-18 LAB
ALBUMIN SERPL BCP-MCNC: 2.5 G/DL (ref 3.5–5.2)
ALP SERPL-CCNC: 86 U/L (ref 55–135)
ALT SERPL W/O P-5'-P-CCNC: 59 U/L (ref 10–44)
ANION GAP SERPL CALC-SCNC: 8 MMOL/L (ref 8–16)
ASCENDING AORTA: 3.91 CM
AST SERPL-CCNC: 66 U/L (ref 10–40)
AV INDEX (PROSTH): 0.75
AV MEAN GRADIENT: 5 MMHG
AV PEAK GRADIENT: 11 MMHG
AV VALVE AREA: 2.87 CM2
AV VELOCITY RATIO: 0.72
BACTERIA BLD CULT: ABNORMAL
BACTERIA UR CULT: ABNORMAL
BASO STIPL BLD QL SMEAR: ABNORMAL
BASOPHILS # BLD AUTO: 0.02 K/UL (ref 0–0.2)
BASOPHILS NFR BLD: 0.2 % (ref 0–1.9)
BILIRUB SERPL-MCNC: 1.3 MG/DL (ref 0.1–1)
BSA FOR ECHO PROCEDURE: 2.12 M2
BUN SERPL-MCNC: 23 MG/DL (ref 8–23)
CALCIUM SERPL-MCNC: 9.3 MG/DL (ref 8.7–10.5)
CHLORIDE SERPL-SCNC: 102 MMOL/L (ref 95–110)
CO2 SERPL-SCNC: 26 MMOL/L (ref 23–29)
CREAT SERPL-MCNC: 1 MG/DL (ref 0.5–1.4)
CV ECHO LV RWT: 0.38 CM
DIFFERENTIAL METHOD: ABNORMAL
DOP CALC AO PEAK VEL: 1.65 M/S
DOP CALC AO VTI: 35.83 CM
DOP CALC LVOT AREA: 3.8 CM2
DOP CALC LVOT DIAMETER: 2.21 CM
DOP CALC LVOT PEAK VEL: 1.19 M/S
DOP CALC LVOT STROKE VOLUME: 102.83 CM3
DOP CALCLVOT PEAK VEL VTI: 26.82 CM
E WAVE DECELERATION TIME: 213.65 MSEC
E/A RATIO: 1.64
E/E' RATIO: 10.57 M/S
ECHO LV POSTERIOR WALL: 1.04 CM (ref 0.6–1.1)
EOSINOPHIL # BLD AUTO: 0.1 K/UL (ref 0–0.5)
EOSINOPHIL NFR BLD: 1 % (ref 0–8)
ERYTHROCYTE [DISTWIDTH] IN BLOOD BY AUTOMATED COUNT: 12.7 % (ref 11.5–14.5)
EST. GFR  (AFRICAN AMERICAN): >60 ML/MIN/1.73 M^2
EST. GFR  (NON AFRICAN AMERICAN): >60 ML/MIN/1.73 M^2
FRACTIONAL SHORTENING: 31 % (ref 28–44)
GLUCOSE SERPL-MCNC: 86 MG/DL (ref 70–110)
HCT VFR BLD AUTO: 38.7 % (ref 40–54)
HGB BLD-MCNC: 12.6 G/DL (ref 14–18)
IMM GRANULOCYTES # BLD AUTO: 0.04 K/UL (ref 0–0.04)
IMM GRANULOCYTES NFR BLD AUTO: 0.5 % (ref 0–0.5)
INTERVENTRICULAR SEPTUM: 0.94 CM (ref 0.6–1.1)
IVRT: 0.11 MSEC
LA MAJOR: 6.27 CM
LA MINOR: 6.53 CM
LA WIDTH: 5.81 CM
LEFT ATRIUM SIZE: 4.58 CM
LEFT ATRIUM VOLUME INDEX: 68.1 ML/M2
LEFT ATRIUM VOLUME: 144.7 CM3
LEFT INTERNAL DIMENSION IN SYSTOLE: 3.79 CM (ref 2.1–4)
LEFT VENTRICLE DIASTOLIC VOLUME INDEX: 70.01 ML/M2
LEFT VENTRICLE DIASTOLIC VOLUME: 148.87 ML
LEFT VENTRICLE MASS INDEX: 100 G/M2
LEFT VENTRICLE SYSTOLIC VOLUME INDEX: 29 ML/M2
LEFT VENTRICLE SYSTOLIC VOLUME: 61.59 ML
LEFT VENTRICULAR INTERNAL DIMENSION IN DIASTOLE: 5.52 CM (ref 3.5–6)
LEFT VENTRICULAR MASS: 211.66 G
LV LATERAL E/E' RATIO: 8.22 M/S
LV SEPTAL E/E' RATIO: 14.8 M/S
LYMPHOCYTES # BLD AUTO: 1.1 K/UL (ref 1–4.8)
LYMPHOCYTES NFR BLD: 13.6 % (ref 18–48)
MCH RBC QN AUTO: 33.9 PG (ref 27–31)
MCHC RBC AUTO-ENTMCNC: 32.6 G/DL (ref 32–36)
MCV RBC AUTO: 104 FL (ref 82–98)
MONOCYTES # BLD AUTO: 1 K/UL (ref 0.3–1)
MONOCYTES NFR BLD: 11.7 % (ref 4–15)
MV PEAK A VEL: 0.45 M/S
MV PEAK E VEL: 0.74 M/S
NEUTROPHILS # BLD AUTO: 6.1 K/UL (ref 1.8–7.7)
NEUTROPHILS NFR BLD: 73 % (ref 38–73)
NRBC BLD-RTO: 0 /100 WBC
PISA TR MAX VEL: 2.54 M/S
PLATELET # BLD AUTO: 150 K/UL (ref 150–350)
PLATELET BLD QL SMEAR: ABNORMAL
PMV BLD AUTO: 10.5 FL (ref 9.2–12.9)
POLYCHROMASIA BLD QL SMEAR: ABNORMAL
POTASSIUM SERPL-SCNC: 4 MMOL/L (ref 3.5–5.1)
PROCALCITONIN SERPL IA-MCNC: 0.84 NG/ML
PROT SERPL-MCNC: 6.7 G/DL (ref 6–8.4)
PULM VEIN S/D RATIO: 0.57
PV PEAK D VEL: 0.67 M/S
PV PEAK S VEL: 0.38 M/S
RA MAJOR: 5.27 CM
RA PRESSURE: 3 MMHG
RA WIDTH: 5.03 CM
RBC # BLD AUTO: 3.72 M/UL (ref 4.6–6.2)
RIGHT VENTRICULAR END-DIASTOLIC DIMENSION: 5.06 CM
RV TISSUE DOPPLER FREE WALL SYSTOLIC VELOCITY 1 (APICAL 4 CHAMBER VIEW): 14.08 CM/S
SINUS: 3.59 CM
SODIUM SERPL-SCNC: 136 MMOL/L (ref 136–145)
STJ: 3.37 CM
TDI LATERAL: 0.09 M/S
TDI SEPTAL: 0.05 M/S
TDI: 0.07 M/S
TOXIC GRANULES BLD QL SMEAR: PRESENT
TR MAX PG: 26 MMHG
TRICUSPID ANNULAR PLANE SYSTOLIC EXCURSION: 3.28 CM
TROPONIN I SERPL DL<=0.01 NG/ML-MCNC: 0.16 NG/ML (ref 0–0.03)
TV REST PULMONARY ARTERY PRESSURE: 29 MMHG
WBC # BLD AUTO: 8.29 K/UL (ref 3.9–12.7)

## 2020-02-18 PROCEDURE — 94761 N-INVAS EAR/PLS OXIMETRY MLT: CPT

## 2020-02-18 PROCEDURE — 99232 SBSQ HOSP IP/OBS MODERATE 35: CPT | Mod: ,,, | Performed by: INTERNAL MEDICINE

## 2020-02-18 PROCEDURE — 36415 COLL VENOUS BLD VENIPUNCTURE: CPT

## 2020-02-18 PROCEDURE — 84145 PROCALCITONIN (PCT): CPT

## 2020-02-18 PROCEDURE — 25000003 PHARM REV CODE 250: Performed by: STUDENT IN AN ORGANIZED HEALTH CARE EDUCATION/TRAINING PROGRAM

## 2020-02-18 PROCEDURE — 63600175 PHARM REV CODE 636 W HCPCS: Performed by: PHYSICIAN ASSISTANT

## 2020-02-18 PROCEDURE — 99233 PR SUBSEQUENT HOSPITAL CARE,LEVL III: ICD-10-PCS | Mod: ,,, | Performed by: PHYSICIAN ASSISTANT

## 2020-02-18 PROCEDURE — 25000003 PHARM REV CODE 250: Performed by: HOSPITALIST

## 2020-02-18 PROCEDURE — 11000001 HC ACUTE MED/SURG PRIVATE ROOM

## 2020-02-18 PROCEDURE — 99232 PR SUBSEQUENT HOSPITAL CARE,LEVL II: ICD-10-PCS | Mod: ,,, | Performed by: INTERNAL MEDICINE

## 2020-02-18 PROCEDURE — 85025 COMPLETE CBC W/AUTO DIFF WBC: CPT

## 2020-02-18 PROCEDURE — 80053 COMPREHEN METABOLIC PANEL: CPT

## 2020-02-18 PROCEDURE — 84484 ASSAY OF TROPONIN QUANT: CPT

## 2020-02-18 PROCEDURE — 99233 SBSQ HOSP IP/OBS HIGH 50: CPT | Mod: ,,, | Performed by: PHYSICIAN ASSISTANT

## 2020-02-18 PROCEDURE — 25000003 PHARM REV CODE 250: Performed by: INTERNAL MEDICINE

## 2020-02-18 RX ORDER — CEFAZOLIN SODIUM 1 G/3ML
2 INJECTION, POWDER, FOR SOLUTION INTRAMUSCULAR; INTRAVENOUS
Status: DISCONTINUED | OUTPATIENT
Start: 2020-02-18 | End: 2020-02-23 | Stop reason: HOSPADM

## 2020-02-18 RX ADMIN — METOPROLOL SUCCINATE 50 MG: 50 TABLET, EXTENDED RELEASE ORAL at 09:02

## 2020-02-18 RX ADMIN — CANDESARTAN CILEXETIL 8 MG: 4 TABLET ORAL at 11:02

## 2020-02-18 RX ADMIN — ACETAMINOPHEN 650 MG: 325 TABLET ORAL at 01:02

## 2020-02-18 RX ADMIN — APIXABAN 5 MG: 5 TABLET, FILM COATED ORAL at 09:02

## 2020-02-18 RX ADMIN — HYDROXYZINE HYDROCHLORIDE 25 MG: 25 TABLET, FILM COATED ORAL at 09:02

## 2020-02-18 RX ADMIN — APIXABAN 5 MG: 5 TABLET, FILM COATED ORAL at 11:02

## 2020-02-18 RX ADMIN — DEXTROSE 6 G: 5 SOLUTION INTRAVENOUS at 11:02

## 2020-02-18 RX ADMIN — CETIRIZINE HYDROCHLORIDE 5 MG: 5 TABLET ORAL at 11:02

## 2020-02-18 RX ADMIN — Medication 6 MG: at 09:02

## 2020-02-18 RX ADMIN — TAMSULOSIN HYDROCHLORIDE 0.4 MG: 0.4 CAPSULE ORAL at 11:02

## 2020-02-18 RX ADMIN — Medication 6 MG: at 01:02

## 2020-02-18 RX ADMIN — AMIODARONE HYDROCHLORIDE 100 MG: 100 TABLET ORAL at 11:02

## 2020-02-18 NOTE — PROGRESS NOTES
Therapy with vancomycin complete and/or consult discontinued by provider.  Pharmacy will sign off, please re-consult as needed.    Jayne Contreras, PharmD, BCPS  [phone]

## 2020-02-18 NOTE — SUBJECTIVE & OBJECTIVE
Interval History: NAEON. Patient overall feels improved. Still with flank pain and hematuria. No stones seen while straining urine. Family at bedside. Discussed abx plan.    Review of Systems   Constitutional: Negative for appetite change, chills, diaphoresis, fatigue and fever.   Respiratory: Negative for cough and shortness of breath.    Cardiovascular: Negative for chest pain and leg swelling.   Gastrointestinal: Positive for abdominal pain. Negative for diarrhea and nausea.   Genitourinary: Positive for dysuria and flank pain. Negative for difficulty urinating, frequency and hematuria.   Musculoskeletal: Negative for arthralgias and back pain.   Skin: Positive for color change (legs). Negative for rash and wound.   Neurological: Positive for weakness. Negative for dizziness and headaches.   Psychiatric/Behavioral: Negative for agitation and confusion. The patient is not nervous/anxious.    All other systems reviewed and are negative.    Objective:     Vital Signs (Most Recent):  Temp: 97.9 °F (36.6 °C) (02/18/20 1231)  Pulse: (!) 51 (02/18/20 1231)  Resp: 14 (02/18/20 1231)  BP: (!) 153/74 (02/18/20 1231)  SpO2: 98 % (02/18/20 1231) Vital Signs (24h Range):  Temp:  [97.8 °F (36.6 °C)-99 °F (37.2 °C)] 97.9 °F (36.6 °C)  Pulse:  [48-60] 51  Resp:  [14-18] 14  SpO2:  [94 %-99 %] 98 %  BP: (151-157)/(74-80) 153/74     Weight: 86.2 kg (190 lb 0.6 oz)  Body mass index is 24.4 kg/m².    Estimated Creatinine Clearance: 78.8 mL/min (based on SCr of 1 mg/dL).    Physical Exam   Constitutional: He is oriented to person, place, and time. He appears well-developed and well-nourished. No distress.   HENT:   Head: Normocephalic and atraumatic.   Mouth/Throat: No oropharyngeal exudate.   Eyes: Conjunctivae are normal. No scleral icterus.   Cardiovascular: Normal rate and regular rhythm.   Pulmonary/Chest: Effort normal and breath sounds normal. No respiratory distress. He has no wheezes.   Abdominal: Soft. He exhibits no  distension. There is tenderness (R).   Musculoskeletal: He exhibits no edema.   Neurological: He is alert and oriented to person, place, and time.   Skin: Skin is warm and dry. No rash noted. He is not diaphoretic.   Hyperpigmentation of lower extremities   Psychiatric: He has a normal mood and affect. His behavior is normal. Thought content normal.       Significant Labs: All pertinent labs within the past 24 hours have been reviewed.    Significant Imaging: I have reviewed all pertinent imaging results/findings within the past 24 hours.

## 2020-02-18 NOTE — PROGRESS NOTES
[facility]  Infectious Disease  Progress Note    Patient Name: Antonio Gross  MRN: [id number]  Admission Date: 2/15/2020  Length of Stay: 3 days  Attending Physician: Lc Fabian MD  Primary Care Provider: Primary Doctor No    Isolation Status: No active isolations  Assessment/Plan:      Bacteremia due to Staphylococcus     69 year-old male admitted with sepsis found to have right sided obstructing ureteral stone s/p stent placement on 2/15; with MSSA in the blood and urine. His fevers resolved on Vanc/Zosyn though still with abdominal/flank pain. Leukocytosis resolved. VSS. Repeat blood cx negative. Staph aureus usually seeds the urine through hematogenous spread and TTE has been ordered.           Plan  - Discontinue Vancomycin and start Cefazolin for MSSA  - Follow repeat blood cultures to ensure sterile  - Recommend at minimum 2D echo for evaluation of endocarditis  - Anticipate at minimum two weeks of IV antibiotics   - Patient seen and plan discussed with primary team. ID will follow.      Please call for any questions. Thank you.  Belinda Bean PA-C  Phone: [phone]  Pager: [phone]    Subjective:     Principal Problem:Right ureteral stone    HPI: Mr. Gloria is a 69 year old gentleman with PMH of HTN, A fib, prostate cancer s/p RRP in 2013 who [place] [facility] with a 3 day history of generalized weakness, fever and decreased po intake. He also notes right flank and lower abdominal pain. He denies chills, nausea, vomiting, chest pain, shortness of breath or lower extremity swelling.  No diarrhea.     In the ED, he was febrile to 102.5F, tachypneic. WBC elevated at 18.8 and SCr was 1.4. Lactate normal. U/A showed > 100 WBC. CXR showed atelectasis without evidence of infection. CT A/P with contrast  revealed an obstructing calculus at the right ureteral pelvic junction measuring 5 mm associated with mild right hydronephrosis, renomegaly, and perinephric stranding. Urology  consulted and patient is now s/p right ureteral stent placement on 2/15. Per op note, purulent drainage was seen.    Admission blood cultures are positive for 4/4 bottles with Staph species. Urine cx with CONS. Repeat urine cx 2/15 obtained during procedure with multiple organisms isolated. He denies recent newman placement or instrumentation prior to admission. No recent procedures or dental work. No hardware.    He has been on Vanc and Zosyn. Fevers resolved. WBC trending down. Abdominal pain stable. Reports episode of incontinence today.    The patient and his wife (professor at [employer]) live between [facility]. Reports recent diagnosis of A fib and HF. Had a few procedures in 2018 and 2019 related to this, including cardioversion. Denies cuts/abrasions thugh does have hyperpigmentation of lower extremities and scrathces frequently.   Interval History: NAEON. Patient overall feels improved. Still with flank pain and hematuria. No stones seen while straining urine. Family at bedside. Discussed abx plan.    Review of Systems   Constitutional: Negative for appetite change, chills, diaphoresis, fatigue and fever.   Respiratory: Negative for cough and shortness of breath.    Cardiovascular: Negative for chest pain and leg swelling.   Gastrointestinal: Positive for abdominal pain. Negative for diarrhea and nausea.   Genitourinary: Positive for dysuria and flank pain. Negative for difficulty urinating, frequency and hematuria.   Musculoskeletal: Negative for arthralgias and back pain.   Skin: Positive for color change (legs). Negative for rash and wound.   Neurological: Positive for weakness. Negative for dizziness and headaches.   Psychiatric/Behavioral: Negative for agitation and confusion. The patient is not nervous/anxious.    All other systems reviewed and are negative.    Objective:     Vital Signs (Most Recent):  Temp: 97.9 °F (36.6 °C) (02/18/20 1231)  Pulse: (!) 51 (02/18/20 1231)  Resp: 14 (02/18/20  1231)  BP: (!) 153/74 (02/18/20 1231)  SpO2: 98 % (02/18/20 1231) Vital Signs (24h Range):  Temp:  [97.8 °F (36.6 °C)-99 °F (37.2 °C)] 97.9 °F (36.6 °C)  Pulse:  [48-60] 51  Resp:  [14-18] 14  SpO2:  [94 %-99 %] 98 %  BP: (151-157)/(74-80) 153/74     Weight: 86.2 kg (190 lb 0.6 oz)  Body mass index is 24.4 kg/m².    Estimated Creatinine Clearance: 78.8 mL/min (based on SCr of 1 mg/dL).    Physical Exam   Constitutional: He is oriented to person, place, and time. He appears well-developed and well-nourished. No distress.   HENT:   Head: Normocephalic and atraumatic.   Mouth/Throat: No oropharyngeal exudate.   Eyes: Conjunctivae are normal. No scleral icterus.   Cardiovascular: Normal rate and regular rhythm.   Pulmonary/Chest: Effort normal and breath sounds normal. No respiratory distress. He has no wheezes.   Abdominal: Soft. He exhibits no distension. There is tenderness (R).   Musculoskeletal: He exhibits no edema.   Neurological: He is alert and oriented to person, place, and time.   Skin: Skin is warm and dry. No rash noted. He is not diaphoretic.   Hyperpigmentation of lower extremities   Psychiatric: He has a normal mood and affect. His behavior is normal. Thought content normal.       Significant Labs: All pertinent labs within the past 24 hours have been reviewed.    Significant Imaging: I have reviewed all pertinent imaging results/findings within the past 24 hours.

## 2020-02-18 NOTE — PLAN OF CARE
Pt remains free from falls and injury. Provided with PRN analgesic with positive results. Pt called RN to room at 01:30 upset he did not receive metotoprolol at HS. Informed pt it was not ordered. Pt then requested VS taken at which time his pulse was 54. Pt then requested tylenol and melantonin. Pt pulse dropped the 43 during this shift per telemetry. Bed low and locked, Call light within reach.     Pt urine strained with no stone found

## 2020-02-18 NOTE — ASSESSMENT & PLAN NOTE
69 year-old male admitted with sepsis found to have right sided obstructing ureteral stone s/p stent placement on 2/15; with MSSA in the blood and urine. His fevers resolved on Vanc/Zosyn though still with abdominal/flank pain. Leukocytosis resolved. VSS. Repeat blood cx negative. Staph aureus usually seeds the urine through hematogenous spread and TTE has been ordered.           Plan  - Discontinue Vancomycin and start Cefazolin for MSSA  - Follow repeat blood cultures to ensure sterile  - Recommend at minimum 2D echo for evaluation of endocarditis  - Anticipate at minimum two weeks of IV antibiotics   - Patient seen and plan discussed with primary team. ID will follow.

## 2020-02-18 NOTE — PLAN OF CARE
CM spoke with patient at bedside. CM explained the role of the CM/SW in assisting with discharge planning. Information in medical records verified with patient. Patient lives with spouse on the 3rd floor of [facility] with twelve steps at entrance, no home DME utilized. Patient anticipates being discharged under the care of himself and family once medically stable. CM name and number on board.         PHARM:   [id number] [place], [place] [facility] [address]  [address] [address]  Phone: [phone] Fax: [phone]        Payor: MANA / Plan: [employer] OPEN CHOICE / Product Type: PPO /          02/18/20 0927   Discharge Assessment   Assessment Type Discharge Planning Assessment   Confirmed/corrected address and phone number on facesheet? Yes   Assessment information obtained from? Patient;Medical Record   Expected Length of Stay (days) 4   Communicated expected length of stay with patient/caregiver yes   Prior to hospitilization cognitive status: Alert/Oriented;No Deficits   Prior to hospitalization functional status: Independent   Current cognitive status: Alert/Oriented;No Deficits   Current Functional Status: Independent   Lives With spouse   Able to Return to Prior Arrangements yes   Is patient able to care for self after discharge? Yes   Patient's perception of discharge disposition home or selfcare   Readmission Within the Last 30 Days no previous admission in last 30 days   Patient currently being followed by outpatient case management? No   Patient currently receives any other outside agency services? No   Equipment Currently Used at Home none   Do you have any problems affording any of your prescribed medications? No   Is the patient taking medications as prescribed? yes   Does the patient have transportation home? Yes   Transportation Anticipated family or friend will provide   Does the patient receive services at the [facility]? No   Discharge Plan A Home   Discharge Plan  B Home;Home with family   DME Needed Upon Discharge  none   Patient/Family in Agreement with Plan yes     Nely Burns RN, BSN     Ext [phone]

## 2020-02-18 NOTE — PROGRESS NOTES
[facility] Hospital Medicine  Progress Note    Patient Name: Antonio Gross  MRN: [id number]  Patient Class: IP- Inpatient   Admission Date: 2/15/2020  Length of Stay: 3 days  Attending Physician: Lc Fabian MD  Primary Care Provider: Primary Doctor [facility] Medicine Team: [facility] Lc Fabian MD    Subjective:     Principal Problem:Right ureteral stone    Interval History: Patient sitting in chair, no acute distress. Family at bedside. Right flank pain and hematuria improving. No new complaints,     Review of Systems   Constitutional: Positive for fatigue. Negative for chills and fever.   HENT: Negative for congestion.    Eyes: Negative for visual disturbance.   Respiratory: Negative for shortness of breath and wheezing.    Cardiovascular: Negative for chest pain and leg swelling.   Gastrointestinal: Negative for abdominal distention, abdominal pain, nausea and vomiting.   Genitourinary: Positive for flank pain and hematuria. Negative for dysuria.   Musculoskeletal: Negative for back pain.   Skin: Negative for wound.   Neurological: Positive for weakness. Negative for dizziness and headaches.   Psychiatric/Behavioral: Negative for confusion.     Objective:     Vital Signs (Most Recent):  Temp: 98.7 °F (37.1 °C) (02/18/20 0130)  Pulse: (!) 49 (02/18/20 0300)  Resp: 18 (02/18/20 0130)  BP: (!) 151/76 (02/18/20 0130)  SpO2: 95 % (02/18/20 0130) Vital Signs (24h Range):  Temp:  [97.9 °F (36.6 °C)-99 °F (37.2 °C)] 98.7 °F (37.1 °C)  Pulse:  [48-64] 49  Resp:  [18] 18  SpO2:  [92 %-99 %] 95 %  BP: (148-157)/(76-80) 151/76     Weight: 86.2 kg (190 lb 0.6 oz)  Body mass index is 24.4 kg/m².    Intake/Output Summary (Last 24 hours) at 2/18/2020 0646  Last data filed at 2/18/2020 0130  Gross per 24 hour   Intake --   Output 500 ml   Net -500 ml      Physical Exam   Constitutional: He is oriented to person, place, and time. He appears well-developed and  well-nourished.   HENT:   Head: Normocephalic and atraumatic.   Eyes: Pupils are equal, round, and reactive to light. Conjunctivae and EOM are normal.   Cardiovascular: Normal rate and regular rhythm.   Pulmonary/Chest: Effort normal and breath sounds normal. No respiratory distress.   Abdominal: Soft. Bowel sounds are normal. He exhibits no distension. There is tenderness. There is no guarding.   Musculoskeletal: Normal range of motion. He exhibits no edema.   Neurological: He is alert and oriented to person, place, and time. No cranial nerve deficit.   Skin: Skin is warm.   Psychiatric: He has a normal mood and affect.       Significant Labs:   A1C:   Recent Labs   Lab 02/16/20  0502   HGBA1C 4.3     Blood Culture:   Recent Labs   Lab 02/17/20  1208   LABBLOO No Growth to date  No Growth to date     CBC:   Recent Labs   Lab 02/17/20  0351   WBC 12.76*   HGB 12.0*   HCT 36.5*   *     CMP:   Recent Labs   Lab 02/17/20  0351   *   K 4.1      CO2 25   *   BUN 29*   CREATININE 1.1   CALCIUM 9.3   PROT 6.2   ALBUMIN 2.4*   BILITOT 0.8   ALKPHOS 76   AST 33   ALT 36   ANIONGAP 6*   EGFRNONAA >60.0     Lactic Acid:   No results for input(s): LACTATE in the last 48 hours.  Magnesium:   Recent Labs   Lab 02/17/20  0351   MG 1.9     Urine Culture:   No results for input(s): LABURIN in the last 48 hours.  Urine Studies:   No results for input(s): COLORU, APPEARANCEUA, PHUR, SPECGRAV, PROTEINUA, GLUCUA, KETONESU, BILIRUBINUA, OCCULTUA, NITRITE, UROBILINOGEN, LEUKOCYTESUR, RBCUA, WBCUA, BACTERIA, SQUAMEPITHEL, HYALINECASTS in the last 48 hours.    Invalid input(s): WRIGHTSUR    Significant Imaging: I have reviewed and interpreted all pertinent imaging results/findings within the past 24 hours.    Assessment/Plan:      Active Diagnoses:    Diagnosis Date Noted POA    PRINCIPAL PROBLEM:  Right ureteral stone [N20.1] 02/15/2020 Yes    Bacteremia due to Staphylococcus [R78.81] 02/17/2020 Unknown     Pyelonephritis [N12] 02/15/2020 Yes    Atrial fibrillation [I48.91] 02/15/2020 Yes    Current use of long term anticoagulation [Z79.01] 02/15/2020 Not Applicable    Essential hypertension [I10] 02/15/2020 Yes      Problems Resolved During this Admission:     Scheduled Meds:   amiodarone  100 mg Oral Daily    apixaban  5 mg Oral BID    candesartan  8 mg Oral Daily    cetirizine  5 mg Oral Daily    metoprolol succinate  50 mg Oral QHS    sodium phosphate IVPB  20.01 mmol Intravenous Once    tamsulosin  0.4 mg Oral Daily    vancomycin (VANCOCIN) IVPB  1,250 mg Intravenous Q24H     Continuous Infusions:    PRN Meds:.acetaminophen, Dextrose 10% Bolus, Dextrose 10% Bolus, glucagon (human recombinant), glucose, glucose, hydrALAZINE, hydrOXYzine HCl, loperamide, melatonin, morphine, ondansetron, ondansetron, oxybutynin, oxyCODONE, promethazine (PHENERGAN) IVPB, senna-docusate 8.6-50 mg, sodium chloride 0.9%, Pharmacy to dose Vancomycin consult **AND** vancomycin - pharmacy to dose    PLAN:    Sepsis due to pyelonephritis of right kidney   MSSA bacteremia   Obstructing right proximal ureteral stone   Hydronephrosis of right kidney   - febrile, tachycpneic, normotensive, sating well on room air  - WBC 18.8 --> 12  - procal: 2.77  - lactate wnl  - CXR negative   - CT A/P showed obstructing 5 mm calculus at the right UPJ associated with mild hydronephrosis, renomegaly and perinephric stranding.   - U/A positive for UTI.  - bcx and ucx growing MSSA  - bcx (2/17) no growth   - ID consulted. apprec  -- Discontinued Vancomycin and started Cefazolin for MSSA on 2/18  -- Follow repeat bcx   -- TTE negative for vegetation   -- Anticipate at minimum two weeks of IV antibiotics   - urology consulted. apprec recs   -- s/p class A right ureteral stent placement on 2/15  -- Patient states he has an upcoming trip to [place] and will be returning on March 8. Will set up for right ureteroscopy upon his return.     Hyponatremia-  resolved   - NA on admit, 128   - likely due to hypovolemia  - off NS @ 100 cc/hr  - BMP daily      Acute renal insufficiency- resolved   - DDx: ALBERTO vs CKD  - Scr 1.4--> 1.1  - baseline creatinine not documented   - s/p IVF  - BMP daily      Hypomagnesia   Hypophosphatemia   - replet prn      HTN  - resumed home metoprolol nightly and candesartan daily      Atrial fibrillation   - on home Eliquis and metoprolol       Right adrenal nodule  - Outpatient endocrinology followup     Allergies  - continue home antihistamines    VTE Risk Mitigation (From admission, onward)         Ordered     apixaban tablet 5 mg  2 times daily      02/15/20 2046     Place sequential compression device  Until discontinued      02/15/20 1746     IP VTE HIGH RISK PATIENT  Once      02/15/20 1702     Place sequential compression device  Until discontinued      02/15/20 1702     Place DRE hose  Until discontinued      02/15/20 1652              Time spent in care of patient : > 35 minutes     Lc Fabian MD  Department of Hospital Medicine   [facility]

## 2020-02-19 LAB
ALBUMIN SERPL BCP-MCNC: 2.3 G/DL (ref 3.5–5.2)
ALP SERPL-CCNC: 81 U/L (ref 55–135)
ALT SERPL W/O P-5'-P-CCNC: 67 U/L (ref 10–44)
ANION GAP SERPL CALC-SCNC: 7 MMOL/L (ref 8–16)
AST SERPL-CCNC: 71 U/L (ref 10–40)
BASOPHILS # BLD AUTO: 0.02 K/UL (ref 0–0.2)
BASOPHILS NFR BLD: 0.3 % (ref 0–1.9)
BILIRUB SERPL-MCNC: 1.1 MG/DL (ref 0.1–1)
BUN SERPL-MCNC: 17 MG/DL (ref 8–23)
CALCIUM SERPL-MCNC: 8.9 MG/DL (ref 8.7–10.5)
CHLORIDE SERPL-SCNC: 103 MMOL/L (ref 95–110)
CO2 SERPL-SCNC: 26 MMOL/L (ref 23–29)
CREAT SERPL-MCNC: 0.8 MG/DL (ref 0.5–1.4)
DIFFERENTIAL METHOD: ABNORMAL
EOSINOPHIL # BLD AUTO: 0.1 K/UL (ref 0–0.5)
EOSINOPHIL NFR BLD: 1.6 % (ref 0–8)
ERYTHROCYTE [DISTWIDTH] IN BLOOD BY AUTOMATED COUNT: 12.8 % (ref 11.5–14.5)
EST. GFR  (AFRICAN AMERICAN): >60 ML/MIN/1.73 M^2
EST. GFR  (NON AFRICAN AMERICAN): >60 ML/MIN/1.73 M^2
GLUCOSE SERPL-MCNC: 118 MG/DL (ref 70–110)
HCT VFR BLD AUTO: 36.7 % (ref 40–54)
HGB BLD-MCNC: 11.8 G/DL (ref 14–18)
IMM GRANULOCYTES # BLD AUTO: 0.03 K/UL (ref 0–0.04)
IMM GRANULOCYTES NFR BLD AUTO: 0.5 % (ref 0–0.5)
LYMPHOCYTES # BLD AUTO: 0.9 K/UL (ref 1–4.8)
LYMPHOCYTES NFR BLD: 15.3 % (ref 18–48)
MCH RBC QN AUTO: 33.6 PG (ref 27–31)
MCHC RBC AUTO-ENTMCNC: 32.2 G/DL (ref 32–36)
MCV RBC AUTO: 105 FL (ref 82–98)
MONOCYTES # BLD AUTO: 0.7 K/UL (ref 0.3–1)
MONOCYTES NFR BLD: 12.8 % (ref 4–15)
NEUTROPHILS # BLD AUTO: 4 K/UL (ref 1.8–7.7)
NEUTROPHILS NFR BLD: 69.5 % (ref 38–73)
NRBC BLD-RTO: 0 /100 WBC
PLATELET # BLD AUTO: 161 K/UL (ref 150–350)
PLATELET BLD QL SMEAR: ABNORMAL
PMV BLD AUTO: 10 FL (ref 9.2–12.9)
POTASSIUM SERPL-SCNC: 3.9 MMOL/L (ref 3.5–5.1)
PROT SERPL-MCNC: 6.3 G/DL (ref 6–8.4)
RBC # BLD AUTO: 3.51 M/UL (ref 4.6–6.2)
SODIUM SERPL-SCNC: 136 MMOL/L (ref 136–145)
WBC # BLD AUTO: 5.76 K/UL (ref 3.9–12.7)

## 2020-02-19 PROCEDURE — 76937 US GUIDE VASCULAR ACCESS: CPT

## 2020-02-19 PROCEDURE — 11000001 HC ACUTE MED/SURG PRIVATE ROOM

## 2020-02-19 PROCEDURE — 63600175 PHARM REV CODE 636 W HCPCS: Performed by: PHYSICIAN ASSISTANT

## 2020-02-19 PROCEDURE — 99231 SBSQ HOSP IP/OBS SF/LOW 25: CPT | Mod: ,,, | Performed by: INTERNAL MEDICINE

## 2020-02-19 PROCEDURE — 25000003 PHARM REV CODE 250: Performed by: HOSPITALIST

## 2020-02-19 PROCEDURE — 94761 N-INVAS EAR/PLS OXIMETRY MLT: CPT

## 2020-02-19 PROCEDURE — 99231 PR SUBSEQUENT HOSPITAL CARE,LEVL I: ICD-10-PCS | Mod: ,,, | Performed by: INTERNAL MEDICINE

## 2020-02-19 PROCEDURE — C1751 CATH, INF, PER/CENT/MIDLINE: HCPCS

## 2020-02-19 PROCEDURE — 85025 COMPLETE CBC W/AUTO DIFF WBC: CPT

## 2020-02-19 PROCEDURE — 36415 COLL VENOUS BLD VENIPUNCTURE: CPT

## 2020-02-19 PROCEDURE — 25000003 PHARM REV CODE 250: Performed by: STUDENT IN AN ORGANIZED HEALTH CARE EDUCATION/TRAINING PROGRAM

## 2020-02-19 PROCEDURE — 99233 SBSQ HOSP IP/OBS HIGH 50: CPT | Mod: ,,, | Performed by: PHYSICIAN ASSISTANT

## 2020-02-19 PROCEDURE — 25000003 PHARM REV CODE 250: Performed by: INTERNAL MEDICINE

## 2020-02-19 PROCEDURE — 99233 PR SUBSEQUENT HOSPITAL CARE,LEVL III: ICD-10-PCS | Mod: ,,, | Performed by: PHYSICIAN ASSISTANT

## 2020-02-19 PROCEDURE — 80053 COMPREHEN METABOLIC PANEL: CPT

## 2020-02-19 PROCEDURE — 36410 VNPNXR 3YR/> PHY/QHP DX/THER: CPT

## 2020-02-19 RX ORDER — CANDESARTAN 4 MG/1
8 TABLET ORAL DAILY
Status: DISCONTINUED | OUTPATIENT
Start: 2020-02-19 | End: 2020-02-23 | Stop reason: HOSPADM

## 2020-02-19 RX ORDER — CANDESARTAN 4 MG/1
16 TABLET ORAL DAILY
Status: DISCONTINUED | OUTPATIENT
Start: 2020-02-19 | End: 2020-02-19

## 2020-02-19 RX ADMIN — CETIRIZINE HYDROCHLORIDE 5 MG: 5 TABLET ORAL at 10:02

## 2020-02-19 RX ADMIN — CEFAZOLIN 2 G: 1 INJECTION, POWDER, FOR SOLUTION INTRAMUSCULAR; INTRAVENOUS at 09:02

## 2020-02-19 RX ADMIN — CEFAZOLIN 2 G: 1 INJECTION, POWDER, FOR SOLUTION INTRAMUSCULAR; INTRAVENOUS at 03:02

## 2020-02-19 RX ADMIN — APIXABAN 5 MG: 5 TABLET, FILM COATED ORAL at 09:02

## 2020-02-19 RX ADMIN — METOPROLOL SUCCINATE 50 MG: 50 TABLET, EXTENDED RELEASE ORAL at 09:02

## 2020-02-19 RX ADMIN — APIXABAN 5 MG: 5 TABLET, FILM COATED ORAL at 10:02

## 2020-02-19 RX ADMIN — AMIODARONE HYDROCHLORIDE 100 MG: 100 TABLET ORAL at 10:02

## 2020-02-19 RX ADMIN — Medication 6 MG: at 09:02

## 2020-02-19 RX ADMIN — TAMSULOSIN HYDROCHLORIDE 0.4 MG: 0.4 CAPSULE ORAL at 10:02

## 2020-02-19 RX ADMIN — CANDESARTAN 8 MG: 4 TABLET ORAL at 10:02

## 2020-02-19 NOTE — PLAN OF CARE
02/19/2020      Antonio Gross  [address] [place] [address]          Hospital Medicine Dept.  [facility] [address] [address] [phone] after hours  [phone] fax Antonio Gross has been hospitalized at the [facility] since 2/15/2020.  Due to a severe blood infection, Mr. Gloria will be unable to travel for at least four to six weeks while receiving antibiotics intravenously at home three times daily. Mr. Gloria and his family members will be required to stay at his home in [place] to assist with antibiotic administration and will be unable to travel until he is medically cleared by his medical providers.     Please contact me if you have any questions.                  __________________________  Lc Fabian MD  02/19/2020

## 2020-02-19 NOTE — PROGRESS NOTES
[facility] Hospital Medicine  Progress Note    Patient Name: Antonio Gross  MRN: [id number]  Patient Class: IP- Inpatient   Admission Date: 2/15/2020  Length of Stay: 4 days  Attending Physician: Lc Fabian MD  Primary Care Provider: Primary Doctor [facility] Medicine Team: [facility] D Lc Fabian MD    Subjective:     Principal Problem:Right ureteral stone    Interval History: Patient sitting in chair, no acute distress. Family at bedside. Patient reports right flank pain and hematuria improving. Denies fever, chills, chest pain, shortness of breath or worsening lower extremity swelling. Loose stools have improved and he is tolerating diet. Denies dizziness, lightheadedness, or syncope.     Review of Systems   Constitutional: Positive for fatigue. Negative for chills and fever.   HENT: Negative for congestion.    Eyes: Negative for visual disturbance.   Respiratory: Negative for shortness of breath and wheezing.    Cardiovascular: Negative for chest pain and leg swelling.   Gastrointestinal: Negative for abdominal distention, abdominal pain, nausea and vomiting.   Genitourinary: Positive for flank pain and hematuria. Negative for dysuria.   Musculoskeletal: Negative for back pain.   Skin: Negative for wound.   Neurological: Positive for weakness. Negative for dizziness and headaches.   Psychiatric/Behavioral: Negative for confusion.     Objective:     Vital Signs (Most Recent):  Temp: 98.6 °F (37 °C) (02/19/20 0030)  Pulse: (!) 54 (02/19/20 0300)  Resp: 20 (02/19/20 0030)  BP: (!) 162/76 (02/19/20 0030)  SpO2: 98 % (02/19/20 0030) Vital Signs (24h Range):  Temp:  [97.8 °F (36.6 °C)-98.6 °F (37 °C)] 98.6 °F (37 °C)  Pulse:  [47-69] 54  Resp:  [14-20] 20  SpO2:  [96 %-99 %] 98 %  BP: (153-164)/(74-80) 162/76     Weight: 86.2 kg (190 lb)  Body mass index is 24.39 kg/m².    Intake/Output Summary (Last 24 hours) at 2/19/2020 0637  Last data filed at 2/19/2020  0300  Gross per 24 hour   Intake 600 ml   Output 2250 ml   Net -1650 ml      Physical Exam   Constitutional: He is oriented to person, place, and time. He appears well-developed and well-nourished.   HENT:   Head: Normocephalic and atraumatic.   Eyes: Pupils are equal, round, and reactive to light. Conjunctivae and EOM are normal.   Cardiovascular: Normal rate and regular rhythm.   Pulmonary/Chest: Effort normal and breath sounds normal. No respiratory distress.   Abdominal: Soft. Bowel sounds are normal. He exhibits no distension. There is tenderness. There is no guarding.   Musculoskeletal: Normal range of motion. He exhibits no edema.   Neurological: He is alert and oriented to person, place, and time. No cranial nerve deficit.   Skin: Skin is warm.   Psychiatric: He has a normal mood and affect.       Significant Labs:   A1C:   Recent Labs   Lab 02/16/20  0502   HGBA1C 4.3     Blood Culture:   Recent Labs   Lab 02/17/20  1208   LABBLOO No Growth to date  No Growth to date  No Growth to date  No Growth to date     CBC:   Recent Labs   Lab 02/18/20  0839   WBC 8.29   HGB 12.6*   HCT 38.7*        CMP:   Recent Labs   Lab 02/18/20  0839      K 4.0      CO2 26   GLU 86   BUN 23   CREATININE 1.0   CALCIUM 9.3   PROT 6.7   ALBUMIN 2.5*   BILITOT 1.3*   ALKPHOS 86   AST 66*   ALT 59*   ANIONGAP 8   EGFRNONAA >60.0     Significant Imaging: I have reviewed and interpreted all pertinent imaging results/findings within the past 24 hours.    Assessment/Plan:      Active Diagnoses:    Diagnosis Date Noted POA    PRINCIPAL PROBLEM:  Right ureteral stone [N20.1] 02/15/2020 Yes    Bacteremia due to Staphylococcus [R78.81] 02/17/2020 Yes    Pyelonephritis [N12] 02/15/2020 Yes    Atrial fibrillation [I48.91] 02/15/2020 Yes    Current use of long term anticoagulation [Z79.01] 02/15/2020 Not Applicable    Essential hypertension [I10] 02/15/2020 Yes      Problems Resolved During this Admission:      Scheduled Meds:   amiodarone  100 mg Oral Daily    apixaban  5 mg Oral BID    candesartan  16 mg Oral Daily    ceFAZolin (ANCEF) IVPB  2 g Intravenous Q8H    cetirizine  5 mg Oral Daily    metoprolol succinate  50 mg Oral QHS    sodium phosphate IVPB  20.01 mmol Intravenous Once    tamsulosin  0.4 mg Oral Daily     Continuous Infusions:    PRN Meds:.acetaminophen, Dextrose 10% Bolus, Dextrose 10% Bolus, glucagon (human recombinant), glucose, glucose, hydrALAZINE, hydrOXYzine HCl, loperamide, melatonin, morphine, ondansetron, ondansetron, oxybutynin, oxyCODONE, promethazine (PHENERGAN) IVPB, senna-docusate 8.6-50 mg, sodium chloride 0.9%    PLAN:    Sepsis due to pyelonephritis of right kidney   MSSA bacteremia   Obstructing right proximal ureteral stone   Hydronephrosis of right kidney   - febrile, tachycpneic, normotensive, sating well on room air  - WBC 18.8 --> 12  - procal: 2.77--> 0.84  - lactate wnl  - CXR negative   - CT A/P showed obstructing 5 mm calculus at the right UPJ associated with mild hydronephrosis, renomegaly and perinephric stranding.   - U/A positive for UTI  - bcx and ucx on 2/15 growing MSSA  - bcx (2/17) no growth   - midline placed on 2/19 AM when initial plan was 2 weeks of antibiotic therapy. Patient will need PICC placed prior to discharge for updated 4-6 week antibiotic therapy course.   - ID consulted. apprec  -- Recommend Cefazolin 2 g IV q 8 hours x 6 weeks (end date 3/30/20)  -- Patient will need CBC and CMP to be drawn weekly with results faxed to the ID Department at  [phone]  -- We will arrange for a follow-up appointment in Infectious Diseases clinic in 2 weeks.  -- Dr. Gamino (ID attending) will meet with patient and family tomorrow to discuss abx therapy   - urology consulted. apprec recs   -- s/p class A right ureteral stent placement on 2/15  -- Urology attending will meet with patient and family tomorrow to discuss options regarding stone and stent  removal     Hyponatremia- resolved   - NA on admit, 128   - likely due to hypovolemia  - off NS @ 100 cc/hr  - BMP daily      ALBERTO- resolved   - DDx: ALBERTO vs CKD  - Scr 1.4--> 0.8  - baseline creatinine not documented   - s/p IVF  - BMP daily      Hypomagnesia   Hypophosphatemia   - replet prn      HTN  - on home metoprolol nightly   - on home candesartan 8 mg daily      Atrial fibrillation   - on home Eliquis and metoprolol       Right adrenal nodule  - Outpatient endocrinology followup     Allergies  - continue home antihistamines    Patient relations  - Patient and family were concerned about the level of communication between departments involved in the patient's care and the clarification of the plan of care when presented to the patient and family.  - Primary team informed the patient and family about the patient relations department as a resource to express their concerns regarding patient care and primary team notified the charge nurse to assist in arranging a representative to speak with the family tomorrow.     VTE Risk Mitigation (From admission, onward)         Ordered     apixaban tablet 5 mg  2 times daily      02/15/20 2046     Place sequential compression device  Until discontinued      02/15/20 1746     IP VTE HIGH RISK PATIENT  Once      02/15/20 1702     Place sequential compression device  Until discontinued      02/15/20 1702     Place DRE hose  Until discontinued      02/15/20 1652              Time spent in care of patient : > 35 minutes     Lc Fabian MD  Department of Hospital Medicine   [facility]

## 2020-02-19 NOTE — ASSESSMENT & PLAN NOTE
69 year-old male admitted with sepsis secondary to MSSA bacteremia found to have MSSA in the urine and right sided obstructing ureteral stone s/p stent placement on 2/15. His fevers and leukocytosis resolved with antibiotics. Clinically improved. Repeat blood cx negative. 2D echo negative.    Plan   Source of bacteremia unclear. Staph aureus is not usually endogenous to the urinary tract so do not suspect this was initial source of his infection and he likely seeded the urine while he was bacteremic. He also has a retained stone suggesting more complicated infectious picture. Given concerns for possible endovascular infection, feel this patient would best be served with long term IV antibiotic therapy.    Recommend Cefazolin 2 g IV q 8 hours x 6 weeks (end date 3/30/20)    Patient will need CBC and CMP to be drawn weekly with results faxed to the ID Department at  [phone]    We will arrange for a follow-up appointment in Infectious Diseases clinic in 2 weeks.    Prior to discharge, please ensure the patient's follow-up has been scheduled.  If there is still no follow-up scheduled in Infectious Diseases clinic, please send an EPIC message to the Infectious Diseases charge nurse Elizabet Manrique.    ID will sign off.

## 2020-02-19 NOTE — PROGRESS NOTES
[facility]  Infectious Disease  Progress Note    Patient Name: Antonio Gross  MRN: [id number]  Admission Date: 2/15/2020  Length of Stay: 4 days  Attending Physician: Lc Fabian MD  Primary Care Provider: Primary Doctor No    Isolation Status: No active isolations  Assessment/Plan:      Bacteremia due to Staphylococcus     69 year-old male admitted with sepsis secondary to MSSA bacteremia found to have MSSA in the urine and right sided obstructing ureteral stone s/p stent placement on 2/15. His fevers and leukocytosis resolved with antibiotics. Clinically improved. Repeat blood cx negative. 2D echo negative.    Plan   Source of bacteremia unclear. Staph aureus is not usually endogenous to the urinary tract so do not suspect this was initial source of his infection and he likely seeded the urine while he was bacteremic. He also has a retained stone suggesting more complicated infectious picture. Given concerns for possible endovascular infection, feel this patient would best be served with long term IV antibiotic therapy.    Recommend Cefazolin 2 g IV q 8 hours x 6 weeks (end date 3/30/20)    Patient will need CBC and CMP to be drawn weekly with results faxed to the ID Department at  [phone]    We will arrange for a follow-up appointment in Infectious Diseases clinic in 2 weeks.    Prior to discharge, please ensure the patient's follow-up has been scheduled.  If there is still no follow-up scheduled in Infectious Diseases clinic, please send an EPIC message to the Infectious Diseases charge nurse Elizabet Manrique.    ID will sign off.        Please call for any questions. Thank you.  Belinda Bean PA-C  Phone: [phone]  Pager: [phone]    Subjective:     Principal Problem:Right ureteral stone    HPI: Mr. Gloria is a 69 year old gentleman with PMH of HTN, A fib, prostate cancer s/p RRP in 2013 who presented to [facility] with a 3 day history of generalized weakness, fever and  decreased po intake. He also notes right flank and lower abdominal pain. He denies chills, nausea, vomiting, chest pain, shortness of breath or lower extremity swelling.  No diarrhea.     In the ED, he was febrile to 102.5F, tachypneic. WBC elevated at 18.8 and SCr was 1.4. Lactate normal. U/A showed > 100 WBC. CXR showed atelectasis without evidence of infection. CT A/P with contrast  revealed an obstructing calculus at the right ureteral pelvic junction measuring 5 mm associated with mild right hydronephrosis, renomegaly, and perinephric stranding. Urology consulted and patient is now s/p right ureteral stent placement on 2/15. Per op note, purulent drainage was seen.    Admission blood cultures are positive for 4/4 bottles with Staph species. Urine cx with CONS. Repeat urine cx 2/15 obtained during procedure with multiple organisms isolated. He denies recent newman placement or instrumentation prior to admission. No recent procedures or dental work. No hardware.    He has been on Vanc and Zosyn. Fevers resolved. WBC trending down. Abdominal pain stable. Reports episode of incontinence today.    The patient and his wife (professor at [employer]) live between [facility]. Reports recent diagnosis of A fib and HF. Had a few procedures in 2018 and 2019 related to this, including cardioversion. Denies cuts/abrasions thugh does have hyperpigmentation of lower extremities and scrathces frequently.   Interval History: NAEON. Patient overall feels improved with improvement in flank pain and near resolution of hematuria.     Review of Systems   Constitutional: Negative for chills, diaphoresis, fatigue and fever.   Respiratory: Negative for cough and shortness of breath.    Cardiovascular: Negative for chest pain.   Gastrointestinal: Positive for abdominal pain. Negative for diarrhea and nausea.   Genitourinary: Positive for dysuria, flank pain (improving) and hematuria. Negative for difficulty urinating.    Musculoskeletal: Negative for arthralgias and back pain.   Skin: Positive for color change (legs). Negative for rash and wound.   Neurological: Positive for weakness. Negative for dizziness and headaches.   Psychiatric/Behavioral: Negative for confusion. The patient is not nervous/anxious.    All other systems reviewed and are negative.    Objective:     Vital Signs (Most Recent):  Temp: 98.2 °F (36.8 °C) (02/19/20 1116)  Pulse: (!) 49 (02/19/20 1116)  Resp: 16 (02/19/20 1116)  BP: (!) 146/77 (02/19/20 1116)  SpO2: 97 % (02/19/20 1116) Vital Signs (24h Range):  Temp:  [98.2 °F (36.8 °C)-98.6 °F (37 °C)] 98.2 °F (36.8 °C)  Pulse:  [47-64] 49  Resp:  [16-20] 16  SpO2:  [97 %-99 %] 97 %  BP: (137-164)/(73-80) 146/77     Weight: 86.2 kg (190 lb)  Body mass index is 24.39 kg/m².    Estimated Creatinine Clearance: 98.5 mL/min (based on SCr of 0.8 mg/dL).    Physical Exam   Constitutional: He is oriented to person, place, and time. He appears well-developed and well-nourished. No distress.   HENT:   Head: Normocephalic and atraumatic.   Mouth/Throat: No oropharyngeal exudate.   Eyes: Conjunctivae are normal. No scleral icterus.   Cardiovascular: Normal rate and regular rhythm.   Pulmonary/Chest: Effort normal and breath sounds normal. No respiratory distress. He has no wheezes.   Abdominal: Soft. He exhibits no distension. There is tenderness (R).   Musculoskeletal: He exhibits no edema.   Neurological: He is alert and oriented to person, place, and time.   Skin: Skin is warm and dry. No rash noted. He is not diaphoretic.   Hyperpigmentation of lower extremities   Psychiatric: He has a normal mood and affect. His behavior is normal. Thought content normal.       Significant Labs: All pertinent labs within the past 24 hours have been reviewed.    Significant Imaging: I have reviewed all pertinent imaging results/findings within the past 24 hours.

## 2020-02-19 NOTE — PHYSICIAN QUERY
"PT Name: Antonio Gross  MR #: [id number]    Physician Query Form - Heart  Condition Clarification     CDS/: Rosa Hughes RN               Contact information: [phone]  This form is a permanent document in the medical record.     Query Date: February 19, 2020    By submitting this query, we are merely seeking further clarification of documentation. Please utilize your independent clinical judgment when addressing the question(s) below.    The medical record contains the following   Indicators     Supporting Clinical Findings Location in Medical Record    BNP     x EF EF 50% 2819 TTE    Radiology findings     x Echo Results Mildly decreased left ventricular systolic function.   The estimated ejection fraction is 50%.    Grade I (mild) left ventricular diastolic dysfunction consistent with impaired relaxation.     2/18 TTE    "Ascites" documented      "SOB" or "MAHAJAN" documented      "Hypoxia" documented      Heart Failure documented      "Edema" documented     x Diuretics/Meds Metoprolol 50mg po    Home meds:  Metoprolol 50mg PO daily  Lisinopril 5mg daily   2/16-18 MAR      2/15 HP       x Treatment: ECHO  Cardiac monitor  Strict intake and output 2/16 NSG orders    Other:      Heart failure (HF) can be acute, chronic or both. It is generally further specificed as systolic, diastolic, or combined. Lastly, it is important to identify an underlying etiology if known or suspected.     Common clues to acute exacerbation:  Rapidly progressive symptoms (w/in 2 weeks of presentation), using IV diuretics to treat, using supplemental O2, pulmonary edema on Xray, MI w/in 4 weeks, and/or BNP >500    Systolic Heart Failure: is defined as chart documentation of a left ventricular ejection fraction (LVEF) less than 40%     Diastolic Heart Failure: is defined as a left ventricular ejection fraction (LVEF) greater than 40%   +      Evidence of diastolic dysfunction on echocardiography OR    Right heart " catheterization wedge pressure above 12 mm Hg OR    Left heart catheterization left ventricular end diastolic pressure 18 mm Hg or above.    References: *American Heart Association    The clinical guidelines noted below are only system guidelines, and do not replace the providers clinical judgment.     Provider, please specify the diagnosis associated with above clinical findings      [ X  ] Chronic Diastolic Heart Failure - Pre-existing diastolic HF diagnosis.  EF > 40%  without  acute HF symptoms documented     [   ] Other Type of Heart Failure (please specify type):     [   ] Heart Failure Ruled Out     [   ] Other (please specify):     [  ] Clinically Undetermined                           Please document in your progress notes daily for the duration of treatment until resolved and include in your discharge summary.

## 2020-02-19 NOTE — PLAN OF CARE
02/19/20 1756   Post-Acute Status   Post-Acute Authorization Placement   Post-Acute Placement Status Referrals Sent       Received a call from the MD stating that family is worried about patient not being able to do ABX at home. Made a referral to [facility] for possible placement. Also recommend that they have [facility] company come and talk with patient and apoloniaioy to see if patient feels comfortable in going home.  SW in contact with CM and Medical staff.       Jose Wallace, [facility]   Ext. [phone]

## 2020-02-19 NOTE — PLAN OF CARE
02/19/20 1156   Post-Acute Status   Post-Acute Authorization Home Health/Hospice   Home Health/Hospice Status Awaiting Internal Medical Clearance      12 hour chart check.

## 2020-02-19 NOTE — CONSULTS
Single [id number] x 8- midline placed tleft brachial-- vein.  Max dwell date3/19/20-, Lot# [id number].  Needle advance into the vessel under real time ultrasound guidance.  Image recorded and saved.

## 2020-02-19 NOTE — SUBJECTIVE & OBJECTIVE
Interval History: NAEON. Patient overall feels improved with improvement in flank pain and near resolution of hematuria.     Review of Systems   Constitutional: Negative for chills, diaphoresis, fatigue and fever.   Respiratory: Negative for cough and shortness of breath.    Cardiovascular: Negative for chest pain.   Gastrointestinal: Positive for abdominal pain. Negative for diarrhea and nausea.   Genitourinary: Positive for dysuria, flank pain (improving) and hematuria. Negative for difficulty urinating.   Musculoskeletal: Negative for arthralgias and back pain.   Skin: Positive for color change (legs). Negative for rash and wound.   Neurological: Positive for weakness. Negative for dizziness and headaches.   Psychiatric/Behavioral: Negative for confusion. The patient is not nervous/anxious.    All other systems reviewed and are negative.    Objective:     Vital Signs (Most Recent):  Temp: 98.2 °F (36.8 °C) (02/19/20 1116)  Pulse: (!) 49 (02/19/20 1116)  Resp: 16 (02/19/20 1116)  BP: (!) 146/77 (02/19/20 1116)  SpO2: 97 % (02/19/20 1116) Vital Signs (24h Range):  Temp:  [98.2 °F (36.8 °C)-98.6 °F (37 °C)] 98.2 °F (36.8 °C)  Pulse:  [47-64] 49  Resp:  [16-20] 16  SpO2:  [97 %-99 %] 97 %  BP: (137-164)/(73-80) 146/77     Weight: 86.2 kg (190 lb)  Body mass index is 24.39 kg/m².    Estimated Creatinine Clearance: 98.5 mL/min (based on SCr of 0.8 mg/dL).    Physical Exam   Constitutional: He is oriented to person, place, and time. He appears well-developed and well-nourished. No distress.   HENT:   Head: Normocephalic and atraumatic.   Mouth/Throat: No oropharyngeal exudate.   Eyes: Conjunctivae are normal. No scleral icterus.   Cardiovascular: Normal rate and regular rhythm.   Pulmonary/Chest: Effort normal and breath sounds normal. No respiratory distress. He has no wheezes.   Abdominal: Soft. He exhibits no distension. There is tenderness (R).   Musculoskeletal: He exhibits no edema.   Neurological: He is alert and  oriented to person, place, and time.   Skin: Skin is warm and dry. No rash noted. He is not diaphoretic.   Hyperpigmentation of lower extremities   Psychiatric: He has a normal mood and affect. His behavior is normal. Thought content normal.       Significant Labs: All pertinent labs within the past 24 hours have been reviewed.    Significant Imaging: I have reviewed all pertinent imaging results/findings within the past 24 hours.

## 2020-02-20 ENCOUNTER — TELEPHONE (OUTPATIENT)
Dept: UROLOGY | Facility: CLINIC | Age: 70
End: 2020-02-20

## 2020-02-20 DIAGNOSIS — N20.1 RIGHT URETERAL STONE: Primary | ICD-10-CM

## 2020-02-20 PROBLEM — N17.9 AKI (ACUTE KIDNEY INJURY): Status: ACTIVE | Noted: 2020-02-20

## 2020-02-20 PROBLEM — R74.01 ELEVATED TRANSAMINASE LEVEL: Status: ACTIVE | Noted: 2020-02-20

## 2020-02-20 PROBLEM — R93.2 ABNORMAL FINDING ON IMAGING OF LIVER: Status: ACTIVE | Noted: 2020-02-20

## 2020-02-20 PROBLEM — A41.9 SEPSIS: Status: ACTIVE | Noted: 2020-02-20

## 2020-02-20 LAB
ALBUMIN SERPL BCP-MCNC: 2.5 G/DL (ref 3.5–5.2)
ALP SERPL-CCNC: 80 U/L (ref 55–135)
ALT SERPL W/O P-5'-P-CCNC: 56 U/L (ref 10–44)
ANION GAP SERPL CALC-SCNC: 6 MMOL/L (ref 8–16)
AST SERPL-CCNC: 62 U/L (ref 10–40)
BASOPHILS # BLD AUTO: 0.02 K/UL (ref 0–0.2)
BASOPHILS NFR BLD: 0.3 % (ref 0–1.9)
BILIRUB SERPL-MCNC: 1 MG/DL (ref 0.1–1)
BUN SERPL-MCNC: 16 MG/DL (ref 8–23)
CALCIUM SERPL-MCNC: 9.1 MG/DL (ref 8.7–10.5)
CHLORIDE SERPL-SCNC: 104 MMOL/L (ref 95–110)
CO2 SERPL-SCNC: 23 MMOL/L (ref 23–29)
CREAT SERPL-MCNC: 0.8 MG/DL (ref 0.5–1.4)
DIFFERENTIAL METHOD: ABNORMAL
EOSINOPHIL # BLD AUTO: 0.1 K/UL (ref 0–0.5)
EOSINOPHIL NFR BLD: 1.5 % (ref 0–8)
ERYTHROCYTE [DISTWIDTH] IN BLOOD BY AUTOMATED COUNT: 12.5 % (ref 11.5–14.5)
EST. GFR  (AFRICAN AMERICAN): >60 ML/MIN/1.73 M^2
EST. GFR  (NON AFRICAN AMERICAN): >60 ML/MIN/1.73 M^2
GLUCOSE SERPL-MCNC: 122 MG/DL (ref 70–110)
HCT VFR BLD AUTO: 35.9 % (ref 40–54)
HGB BLD-MCNC: 11.7 G/DL (ref 14–18)
IMM GRANULOCYTES # BLD AUTO: 0.09 K/UL (ref 0–0.04)
IMM GRANULOCYTES NFR BLD AUTO: 1.3 % (ref 0–0.5)
LYMPHOCYTES # BLD AUTO: 1 K/UL (ref 1–4.8)
LYMPHOCYTES NFR BLD: 13.7 % (ref 18–48)
MCH RBC QN AUTO: 33.5 PG (ref 27–31)
MCHC RBC AUTO-ENTMCNC: 32.6 G/DL (ref 32–36)
MCV RBC AUTO: 103 FL (ref 82–98)
MONOCYTES # BLD AUTO: 0.8 K/UL (ref 0.3–1)
MONOCYTES NFR BLD: 10.5 % (ref 4–15)
NEUTROPHILS # BLD AUTO: 5.2 K/UL (ref 1.8–7.7)
NEUTROPHILS NFR BLD: 72.7 % (ref 38–73)
NRBC BLD-RTO: 0 /100 WBC
PLATELET # BLD AUTO: 170 K/UL (ref 150–350)
PMV BLD AUTO: 9.9 FL (ref 9.2–12.9)
POTASSIUM SERPL-SCNC: 3.8 MMOL/L (ref 3.5–5.1)
PROT SERPL-MCNC: 6.7 G/DL (ref 6–8.4)
RBC # BLD AUTO: 3.49 M/UL (ref 4.6–6.2)
SODIUM SERPL-SCNC: 133 MMOL/L (ref 136–145)
WBC # BLD AUTO: 7.13 K/UL (ref 3.9–12.7)

## 2020-02-20 PROCEDURE — 85025 COMPLETE CBC W/AUTO DIFF WBC: CPT

## 2020-02-20 PROCEDURE — 36415 COLL VENOUS BLD VENIPUNCTURE: CPT

## 2020-02-20 PROCEDURE — 63600175 PHARM REV CODE 636 W HCPCS: Performed by: PHYSICIAN ASSISTANT

## 2020-02-20 PROCEDURE — 99233 SBSQ HOSP IP/OBS HIGH 50: CPT | Mod: ,,, | Performed by: INTERNAL MEDICINE

## 2020-02-20 PROCEDURE — 99233 PR SUBSEQUENT HOSPITAL CARE,LEVL III: ICD-10-PCS | Mod: ,,, | Performed by: INTERNAL MEDICINE

## 2020-02-20 PROCEDURE — 25000003 PHARM REV CODE 250: Performed by: STUDENT IN AN ORGANIZED HEALTH CARE EDUCATION/TRAINING PROGRAM

## 2020-02-20 PROCEDURE — 11000001 HC ACUTE MED/SURG PRIVATE ROOM

## 2020-02-20 PROCEDURE — 80053 COMPREHEN METABOLIC PANEL: CPT

## 2020-02-20 PROCEDURE — 25000003 PHARM REV CODE 250: Performed by: HOSPITALIST

## 2020-02-20 PROCEDURE — 25000003 PHARM REV CODE 250: Performed by: INTERNAL MEDICINE

## 2020-02-20 RX ADMIN — CEFAZOLIN 2 G: 1 INJECTION, POWDER, FOR SOLUTION INTRAMUSCULAR; INTRAVENOUS at 06:02

## 2020-02-20 RX ADMIN — Medication 6 MG: at 09:02

## 2020-02-20 RX ADMIN — CETIRIZINE HYDROCHLORIDE 5 MG: 5 TABLET ORAL at 08:02

## 2020-02-20 RX ADMIN — APIXABAN 5 MG: 5 TABLET, FILM COATED ORAL at 09:02

## 2020-02-20 RX ADMIN — CANDESARTAN 8 MG: 4 TABLET ORAL at 08:02

## 2020-02-20 RX ADMIN — CEFAZOLIN 2 G: 1 INJECTION, POWDER, FOR SOLUTION INTRAMUSCULAR; INTRAVENOUS at 02:02

## 2020-02-20 RX ADMIN — AMIODARONE HYDROCHLORIDE 100 MG: 100 TABLET ORAL at 08:02

## 2020-02-20 RX ADMIN — CEFAZOLIN 2 G: 1 INJECTION, POWDER, FOR SOLUTION INTRAMUSCULAR; INTRAVENOUS at 09:02

## 2020-02-20 RX ADMIN — TAMSULOSIN HYDROCHLORIDE 0.4 MG: 0.4 CAPSULE ORAL at 08:02

## 2020-02-20 RX ADMIN — APIXABAN 5 MG: 5 TABLET, FILM COATED ORAL at 08:02

## 2020-02-20 NOTE — PROGRESS NOTES
[facility]  Infectious Disease  Progress Note    Patient Name: Antonio Gross  MRN: [id number]  Admission Date: 2/15/2020  Length of Stay: 5 days  Attending Physician: Nancy Arteaga MD  Primary Care Provider: Primary Doctor No    Isolation Status: No active isolations  Assessment/Plan:      Bacteremia due to Staphylococcus  69-year-old male with history of afib on eliquis, HTN, bilateral LE chronic venous stasis, admitted with sepsis found to have MSSA pyelonephritis with right-sided obstructing ureteral stone s/p ureteral stent placement on 2/15/2020 with notable purulence c/b MSSA bacteremia, clinically improved after initiation of cefazolin IV.    Unclear source of MSSA bacteremia - patient with no recent surgeries or procedures, noted to have multiple skin lesions related to his chronic venous stasis which could have been a point of entry.  MSSA is not a urinary pathogen - suspect MSSA infection initiated an endovascular source with resulting seeding to his urinary tract. Lower suspicion for endocarditis as blood cultures cleared immediately, also TTE with no valvular lesions - will treat conservatively with 6 weeks of IV cefazolin.  At this point, ureteral stone and stent remain ongoing niduses of infection - recommend at least two weeks of cefazolin after urological procedure to remove ureteral stone.          Recommendations   - Place PICC line, remove midline  - Continue cefazolin 2 g IV q 8 hours while inpatient - on discharge transition to cefazolin 6g IV continuous infusion q24 hours, plan for 6 week course, last day 3/26/2020 (will need to ensure patient receives 2 weeks after ureteral stone removal)    Patient will need CBC and CMP to be drawn weekly with results faxed to Dr. Gamino in the ID Department at [phone]    We will arrange for a follow-up appointment with Dr. Gamino in Infectious Diseases clinic in 2 weeks.    Prior to discharge, please ensure the patient's  follow-up has been scheduled.  If there is still no follow-up scheduled in Infectious Diseases clinic, please send an EPIC message to the Infectious Diseases charge nurse Elizabet Manrique.            Thank you for your consult. I will follow-up with patient. Please contact us if you have any additional questions.    Mariaelena Gamino MD  Infectious Disease  [facility]    Subjective:     Principal Problem:Pyelonephritis    HPI: Mr. Gloria is a 69 year old gentleman with PMH of HTN, A fib, prostate cancer s/p RRP in 2013 who presented to [facility] with a 3 day history of generalized weakness, fever and decreased po intake. He also notes right flank and lower abdominal pain. He denies chills, nausea, vomiting, chest pain, shortness of breath or lower extremity swelling.  No diarrhea.     In the ED, he was febrile to 102.5F, tachypneic. WBC elevated at 18.8 and SCr was 1.4. Lactate normal. U/A showed > 100 WBC. CXR showed atelectasis without evidence of infection. CT A/P with contrast  revealed an obstructing calculus at the right ureteral pelvic junction measuring 5 mm associated with mild right hydronephrosis, renomegaly, and perinephric stranding. Urology consulted and patient is now s/p right ureteral stent placement on 2/15. Per op note, purulent drainage was seen.    Admission blood cultures are positive for 4/4 bottles with Staph species. Urine cx with CONS. Repeat urine cx 2/15 obtained during procedure with multiple organisms isolated. He denies recent newman placement or instrumentation prior to admission. No recent procedures or dental work. No hardware.    He has been on Vanc and Zosyn. Fevers resolved. WBC trending down. Abdominal pain stable. Reports episode of incontinence today.    The patient and his wife (professor at [employer]) live between [facility]. Reports recent diagnosis of A fib and HF. Had a few procedures in 2018 and 2019 related to this, including cardioversion. Denies  cuts/abrasions thugh does have hyperpigmentation of lower extremities and scrathces frequently.   Interval History:   No acute events overnight  Patient reports intermittent right sided flank pain  Patient's wife, two children, two children-in-law at bedside    Review of Systems   Constitutional: Negative for chills, diaphoresis and fever.   HENT: Negative for rhinorrhea and sore throat.    Respiratory: Negative for cough and shortness of breath.    Cardiovascular: Negative for chest pain and leg swelling.   Gastrointestinal: Negative for abdominal pain, diarrhea, nausea and vomiting.   Genitourinary: Positive for flank pain. Negative for dysuria and hematuria.   Musculoskeletal: Negative for arthralgias and myalgias.   Skin: Negative for rash.   Neurological: Negative for headaches.     Objective:     Vital Signs (Most Recent):  Temp: 98.6 °F (37 °C) (02/20/20 1720)  Pulse: (!) 45 (02/20/20 1720)  Resp: 18 (02/20/20 1720)  BP: (!) 153/78 (02/20/20 1720)  SpO2: (!) 94 % (02/20/20 1720) Vital Signs (24h Range):  Temp:  [97.3 °F (36.3 °C)-98.6 °F (37 °C)] 98.6 °F (37 °C)  Pulse:  [45-49] 45  Resp:  [14-18] 18  SpO2:  [94 %-99 %] 94 %  BP: (144-159)/(70-78) 153/78     Weight: 86.2 kg (190 lb)  Body mass index is 24.39 kg/m².    Estimated Creatinine Clearance: 98.5 mL/min (based on SCr of 0.8 mg/dL).    Physical Exam   Constitutional: He is oriented to person, place, and time. He appears well-developed and well-nourished. No distress.   HENT:   Head: Normocephalic and atraumatic.   Eyes: Conjunctivae and EOM are normal.   Neck: Normal range of motion. Neck supple.   Pulmonary/Chest: Effort normal. No respiratory distress.   Abdominal: Soft. He exhibits no distension.   Musculoskeletal: Normal range of motion. He exhibits no edema.   Neurological: He is alert and oriented to person, place, and time.   Skin: Skin is warm and dry. No rash noted. He is not diaphoretic. No erythema.   Hyperpigmentation on anterior bilateral  lower extremities   Psychiatric: He has a normal mood and affect. His behavior is normal.   Vitals reviewed.      Significant Labs: All pertinent labs within the past 24 hours have been reviewed.    Significant Imaging: I have reviewed all pertinent imaging results/findings within the past 24 hours.

## 2020-02-20 NOTE — CONSULTS
NIAS at bedside for PICC placement. Pt and family refusing PICC placement. Nancy Arteaga MD notified. Re consult if needed.

## 2020-02-20 NOTE — PLAN OF CARE
02/20/20 0912   Post-Acute Status   Post-Acute Authorization Home Health/Hospice   Home Health/Hospice Status Awaiting Internal Medical Clearance     Met with Pt, spouse and family in the room. Answered questions about infusion and d/c plans. Sw sent referral to [place] [facility] to see if family and Pt are comfortable with teaching with q8 abx due. Sw to follow and also provide sitter list of nurses and aides to help with Pt at home when spouse returns to work, sw to follow.

## 2020-02-20 NOTE — PROGRESS NOTES
[facility] Hospital Medicine  Progress Note    Patient Name: Antonio Gross  MRN: [id number]  Patient Class: IP- Inpatient   Admission Date: 2/15/2020  Length of Stay: 5 days  Attending Physician: Nancy Arteaga MD  Primary Care Provider: Primary Doctor [facility] Medicine Team: [facility] MED D Nancy Arteaga MD    Subjective:     Principal Problem:Pyelonephritis    Interval History:  Chief Complaint   Patient presents with    Multiple Complaints     thurs morning noted unsteady, diarrhea     Follow up visit for Pyelonephritis    History / Events Overnight: No significant events reported by Nursing. Patient with no new complaints.    Data reviewed 2/20/2020:  No leukocytosis.     Review of Systems   Constitutional: Positive for fatigue. Negative for fever.   Respiratory: Negative for shortness of breath.    Genitourinary: Positive for flank pain.     Objective:     Vital Signs (Most Recent):  Temp: 98.3 °F (36.8 °C) (02/20/20 1159)  Pulse: (!) 48 (02/20/20 1159)  Resp: 14 (02/20/20 1159)  BP: (!) 144/70 (02/20/20 1159)  SpO2: 97 % (02/20/20 1159) Vital Signs (24h Range):  Temp:  [97.3 °F (36.3 °C)-98.3 °F (36.8 °C)] 98.3 °F (36.8 °C)  Pulse:  [48-49] 48  Resp:  [14-20] 14  SpO2:  [97 %-99 %] 97 %  BP: (144-159)/(70-77) 144/70     Weight: 86.2 kg (190 lb)  Body mass index is 24.39 kg/m².  No intake or output data in the 24 hours ending 02/20/20 1541   Physical Exam   Constitutional: He is cooperative. No distress.   Eyes: Conjunctivae and lids are normal. No scleral icterus.   Cardiovascular: Regular rhythm, S1 normal and S2 normal. Bradycardia present.   Pulmonary/Chest: Effort normal and breath sounds normal. No respiratory distress. He has no wheezes. He has no rhonchi.   Abdominal: Soft. Normal appearance and bowel sounds are normal.   Musculoskeletal: Normal range of motion. He exhibits edema.   Neurological: He is alert. He is not disoriented.   Skin: Skin is warm and  dry. No cyanosis. No pallor.   Venous stasis dermatitis present     Significant Labs:   A1C:   Recent Labs   Lab 02/16/20  0502   HGBA1C 4.3     CBC:   Recent Labs   Lab 02/19/20  0742 02/20/20  0919   WBC 5.76 7.13   HGB 11.8* 11.7*   HCT 36.7* 35.9*    170     CMP:   Recent Labs   Lab 02/19/20  0742 02/20/20  0919    133*   K 3.9 3.8    104   CO2 26 23   * 122*   BUN 17 16   CREATININE 0.8 0.8   CALCIUM 8.9 9.1   PROT 6.3 6.7   ALBUMIN 2.3* 2.5*   BILITOT 1.1* 1.0   ALKPHOS 81 80   AST 71* 62*   ALT 67* 56*   ANIONGAP 7* 6*   EGFRNONAA >60.0 >60.0     Significant Imaging:   CT A/P: 2/15/2020    1. Obstructing calculus at the right ureteral pelvic junction measuring 5 mm associated with mild right hydronephrosis, renomegaly, and perinephric stranding.  2. Hepatosplenomegaly with slight nodularity of the liver suggesting the possibility of cirrhosis.  Correlate with liver function tests  3. Two indeterminate enhancing liver lesions.  Abdominal MRI is recommended for further characterization.  4. 1.3 cm right adrenal nodule with nonspecific enhancement characteristics.  This could also be further evaluated with abdominal MRI.  5. Postsurgical change of prior prostatectomy  6. Fat containing umbilical hernia and bilateral inguinal hernias       Assessment/Plan:      Active Diagnoses:    Diagnosis Date Noted POA    PRINCIPAL PROBLEM:  Pyelonephritis [N12] 02/15/2020 Yes    Sepsis [A41.9] 02/20/2020 Yes    ALBERTO (acute kidney injury) [N17.9] 02/20/2020 Yes    Abnormal finding on imaging of liver [R93.2] 02/20/2020 Yes    Bacteremia due to Staphylococcus [R78.81] 02/17/2020 Yes    Right ureteral stone [N20.1] 02/15/2020 Yes    Atrial fibrillation [I48.91] 02/15/2020 Yes    Current use of long term anticoagulation [Z79.01] 02/15/2020 Not Applicable    Essential hypertension [I10] 02/15/2020 Yes      Problems Resolved During this Admission:       Overview / ICU Course:    Antonio Alexandre  Juani is a 69 y.o. male with medical history significant for HTN, atrial fibrillation (on Eliquis), prostate cancer s/p RRP in 2013 admitted for Pyelonephritis associated with nephrolithiasis.    Inpatient Medications Prescribed for Management of Current Problems:     Scheduled Meds:    amiodarone  100 mg Oral Daily    apixaban  5 mg Oral BID    candesartan  8 mg Oral Daily    ceFAZolin (ANCEF) IVPB  2 g Intravenous Q8H    cetirizine  5 mg Oral Daily    metoprolol succinate  50 mg Oral QHS    sodium phosphate IVPB  20.01 mmol Intravenous Once    tamsulosin  0.4 mg Oral Daily     Continuous Infusions:   As Needed: acetaminophen, Dextrose 10% Bolus, Dextrose 10% Bolus, glucagon (human recombinant), glucose, glucose, hydrALAZINE, hydrOXYzine HCl, loperamide, melatonin, morphine, ondansetron, ondansetron, oxybutynin, oxyCODONE, promethazine (PHENERGAN) IVPB, senna-docusate 8.6-50 mg, sodium chloride 0.9%    Assessment and Plan by Problem    Sepsis due to pyelonephritis of right kidney   MSSA bacteremia   Obstructing right proximal ureteral stone   Hydronephrosis of right kidney   - febrile, tachycpneic, normotensive, sating well on room air on admission   - elevated WBC and procalcitonin; now down trending  - lactate wnl  - CXR negative   - CT A/P showed obstructing 5 mm calculus at the right UPJ associated with mild hydronephrosis, renomegaly and perinephric stranding.   - U/A positive for UTI.  - blood and urine cultures growing MSSA  - repeat blood culture (2/17) no growth   - ID consulted.  -- Discontinued Vancomycin and started Cefazolin for MSSA on 2/18  -- TTE negative for vegetation   -- Anticipate Cefazolin 2 g IV q 8 hours x 6 weeks (estimated end date 3/30/20)  -- Patient will need CBC and CMP to be drawn weekly with results faxed to the ID Department at  [phone] and a follow-up appointment in Infectious Diseases clinic in 2 weeks.  - Urology consulted.   -- s/p class A right ureteral stent  placement on 2/15  -- Plan for outpatient ureteroscopy once infection is adequately treated to definitively treat ureteral stone.     Hyponatremia  - NA on admit, 128   - likely due to hypovolemia  - off NS @ 100 cc/hr  - Problem is resolving; continuing to monitor serial electrolytes 2/20/2020.      Acute renal insufficiency - resolved   - baseline creatinine not documented   - s/p IVF     Hypomagnesemia   Hypophosphatemia   - replete prn      HTN  - resumed home metoprolol nightly and candesartan daily      Atrial fibrillation   - on home Eliquis and metoprolol       Right adrenal nodule  - Outpatient endocrinology follow up     Allergies  - continue home antihistamines    Abnormal finding on imaging of liver  CT revealed Hepatosplenomegaly with slight nodularity of the liver suggesting the possibility of cirrhosis and two indeterminate enhancing liver lesions.  Abdominal MRI recommended for further characterization.    Time spent in care of the patient:  Visit time, face-to-face and on the Unit, was > 35 minutes. Time was spent speaking with consultants and case management, reviewing records, and discussing the plan of care with patient/family at bedside. Greater than 50% of this time was spent counseling the patient on treatment options.   Total 48 minutes.    Discharge plan and follow up  IV Therapy Provider  THERON 2/21/2020  Previous admission:     Goals of Care:  General Prognosis: good  Goals: Curative  Comfort Only: No  Hospice: No  Code Status: Full Code    Diet: Diet [facility]; Low Fiber  GI Prophylaxis: Not indicated  Significant LDAs:   IV Access Type: Mid-line  Urinary Catheter Indication if present: Patient Does Not Have Urinary Catheter  Other Lines/Tubes/Drains:    VTE Risk Mitigation (From admission, onward)         Ordered     apixaban tablet 5 mg  2 times daily      02/15/20 2046     Place sequential compression device  Until discontinued      02/15/20 1746     IP VTE HIGH RISK  PATIENT  Once      02/15/20 1702     Place sequential compression device  Until discontinued      02/15/20 1702     Place DRE hose  Until discontinued      02/15/20 1652                Nancy Arteaga MD  Department of Hospital Medicine   [facility]

## 2020-02-20 NOTE — TELEPHONE ENCOUNTER
Spoke with the patient and the suggested procedure date of 3-4, he couldn't do so he asked for 3-11. His procedure is scheduled for that date

## 2020-02-20 NOTE — ASSESSMENT & PLAN NOTE
69-year-old male with history of afib on eliquis, HTN, bilateral LE chronic venous stasis, admitted with sepsis found to have MSSA pyelonephritis with right-sided obstructing ureteral stone s/p ureteral stent placement on 2/15/2020 with notable purulence c/b MSSA bacteremia, clinically improved after initiation of cefazolin IV.    Unclear source of MSSA bacteremia - patient with no recent surgeries or procedures, noted to have multiple skin lesions related to his chronic venous stasis which could have been a point of entry.  MSSA is not a urinary pathogen - suspect MSSA infection initiated an endovascular source with resulting seeding to his urinary tract. Lower suspicion for endocarditis as blood cultures cleared immediately, also TTE with no valvular lesions - will treat conservatively with 6 weeks of IV cefazolin.  At this point, ureteral stone and stent remain ongoing niduses of infection - recommend at least two weeks of cefazolin after urological procedure to remove ureteral stone.          Recommendations   - Place PICC line, remove midline  - Continue cefazolin 2 g IV q 8 hours while inpatient - on discharge transition to cefazolin 6g IV continuous infusion q24 hours, plan for 6 week course, last day 3/26/2020 (will need to ensure patient receives 2 weeks after ureteral stone removal)    Patient will need CBC and CMP to be drawn weekly with results faxed to Dr. Gamino in the ID Department at [phone]    We will arrange for a follow-up appointment with Dr. Gamino in Infectious Diseases clinic in 2 weeks.    Prior to discharge, please ensure the patient's follow-up has been scheduled.  If there is still no follow-up scheduled in Infectious Diseases clinic, please send an EPIC message to the Infectious Diseases charge nurse Elizabet Manrique.

## 2020-02-20 NOTE — SUBJECTIVE & OBJECTIVE
Interval History:   No acute events overnight  Patient reports intermittent right sided flank pain  Patient's wife, two children, two children-in-law at bedside    Review of Systems   Constitutional: Negative for chills, diaphoresis and fever.   HENT: Negative for rhinorrhea and sore throat.    Respiratory: Negative for cough and shortness of breath.    Cardiovascular: Negative for chest pain and leg swelling.   Gastrointestinal: Negative for abdominal pain, diarrhea, nausea and vomiting.   Genitourinary: Positive for flank pain. Negative for dysuria and hematuria.   Musculoskeletal: Negative for arthralgias and myalgias.   Skin: Negative for rash.   Neurological: Negative for headaches.     Objective:     Vital Signs (Most Recent):  Temp: 98.6 °F (37 °C) (02/20/20 1720)  Pulse: (!) 45 (02/20/20 1720)  Resp: 18 (02/20/20 1720)  BP: (!) 153/78 (02/20/20 1720)  SpO2: (!) 94 % (02/20/20 1720) Vital Signs (24h Range):  Temp:  [97.3 °F (36.3 °C)-98.6 °F (37 °C)] 98.6 °F (37 °C)  Pulse:  [45-49] 45  Resp:  [14-18] 18  SpO2:  [94 %-99 %] 94 %  BP: (144-159)/(70-78) 153/78     Weight: 86.2 kg (190 lb)  Body mass index is 24.39 kg/m².    Estimated Creatinine Clearance: 98.5 mL/min (based on SCr of 0.8 mg/dL).    Physical Exam   Constitutional: He is oriented to person, place, and time. He appears well-developed and well-nourished. No distress.   HENT:   Head: Normocephalic and atraumatic.   Eyes: Conjunctivae and EOM are normal.   Neck: Normal range of motion. Neck supple.   Pulmonary/Chest: Effort normal. No respiratory distress.   Abdominal: Soft. He exhibits no distension.   Musculoskeletal: Normal range of motion. He exhibits no edema.   Neurological: He is alert and oriented to person, place, and time.   Skin: Skin is warm and dry. No rash noted. He is not diaphoretic. No erythema.   Hyperpigmentation on anterior bilateral lower extremities   Psychiatric: He has a normal mood and affect. His behavior is normal.   Vitals  reviewed.      Significant Labs: All pertinent labs within the past 24 hours have been reviewed.    Significant Imaging: I have reviewed all pertinent imaging results/findings within the past 24 hours.

## 2020-02-21 LAB
ALBUMIN SERPL BCP-MCNC: 2.7 G/DL (ref 3.5–5.2)
ALP SERPL-CCNC: 84 U/L (ref 55–135)
ALT SERPL W/O P-5'-P-CCNC: 51 U/L (ref 10–44)
ANION GAP SERPL CALC-SCNC: 7 MMOL/L (ref 8–16)
AST SERPL-CCNC: 66 U/L (ref 10–40)
BASOPHILS # BLD AUTO: 0.03 K/UL (ref 0–0.2)
BASOPHILS NFR BLD: 0.5 % (ref 0–1.9)
BILIRUB SERPL-MCNC: 1 MG/DL (ref 0.1–1)
BUN SERPL-MCNC: 14 MG/DL (ref 8–23)
CALCIUM SERPL-MCNC: 9.1 MG/DL (ref 8.7–10.5)
CHLORIDE SERPL-SCNC: 104 MMOL/L (ref 95–110)
CO2 SERPL-SCNC: 24 MMOL/L (ref 23–29)
CREAT SERPL-MCNC: 0.9 MG/DL (ref 0.5–1.4)
DIFFERENTIAL METHOD: ABNORMAL
EOSINOPHIL # BLD AUTO: 0.1 K/UL (ref 0–0.5)
EOSINOPHIL NFR BLD: 1.8 % (ref 0–8)
ERYTHROCYTE [DISTWIDTH] IN BLOOD BY AUTOMATED COUNT: 12.7 % (ref 11.5–14.5)
EST. GFR  (AFRICAN AMERICAN): >60 ML/MIN/1.73 M^2
EST. GFR  (NON AFRICAN AMERICAN): >60 ML/MIN/1.73 M^2
GLUCOSE SERPL-MCNC: 112 MG/DL (ref 70–110)
HCT VFR BLD AUTO: 37.1 % (ref 40–54)
HGB BLD-MCNC: 12.4 G/DL (ref 14–18)
IMM GRANULOCYTES # BLD AUTO: 0.11 K/UL (ref 0–0.04)
IMM GRANULOCYTES NFR BLD AUTO: 1.8 % (ref 0–0.5)
LYMPHOCYTES # BLD AUTO: 1.1 K/UL (ref 1–4.8)
LYMPHOCYTES NFR BLD: 16.9 % (ref 18–48)
MCH RBC QN AUTO: 34.3 PG (ref 27–31)
MCHC RBC AUTO-ENTMCNC: 33.4 G/DL (ref 32–36)
MCV RBC AUTO: 103 FL (ref 82–98)
MONOCYTES # BLD AUTO: 0.7 K/UL (ref 0.3–1)
MONOCYTES NFR BLD: 10.4 % (ref 4–15)
NEUTROPHILS # BLD AUTO: 4.3 K/UL (ref 1.8–7.7)
NEUTROPHILS NFR BLD: 68.6 % (ref 38–73)
NRBC BLD-RTO: 0 /100 WBC
PLATELET # BLD AUTO: 201 K/UL (ref 150–350)
PMV BLD AUTO: 9.5 FL (ref 9.2–12.9)
POTASSIUM SERPL-SCNC: 3.7 MMOL/L (ref 3.5–5.1)
PROT SERPL-MCNC: 7.2 G/DL (ref 6–8.4)
RBC # BLD AUTO: 3.62 M/UL (ref 4.6–6.2)
SODIUM SERPL-SCNC: 135 MMOL/L (ref 136–145)
WBC # BLD AUTO: 6.27 K/UL (ref 3.9–12.7)

## 2020-02-21 PROCEDURE — 80053 COMPREHEN METABOLIC PANEL: CPT

## 2020-02-21 PROCEDURE — 99233 SBSQ HOSP IP/OBS HIGH 50: CPT | Mod: ,,, | Performed by: INTERNAL MEDICINE

## 2020-02-21 PROCEDURE — 36573 INSJ PICC RS&I 5 YR+: CPT

## 2020-02-21 PROCEDURE — 99233 PR SUBSEQUENT HOSPITAL CARE,LEVL III: ICD-10-PCS | Mod: ,,, | Performed by: INTERNAL MEDICINE

## 2020-02-21 PROCEDURE — 63600175 PHARM REV CODE 636 W HCPCS: Performed by: PHYSICIAN ASSISTANT

## 2020-02-21 PROCEDURE — 99232 SBSQ HOSP IP/OBS MODERATE 35: CPT | Mod: ,,, | Performed by: INTERNAL MEDICINE

## 2020-02-21 PROCEDURE — C1751 CATH, INF, PER/CENT/MIDLINE: HCPCS

## 2020-02-21 PROCEDURE — 25000003 PHARM REV CODE 250: Performed by: STUDENT IN AN ORGANIZED HEALTH CARE EDUCATION/TRAINING PROGRAM

## 2020-02-21 PROCEDURE — A4216 STERILE WATER/SALINE, 10 ML: HCPCS | Performed by: INTERNAL MEDICINE

## 2020-02-21 PROCEDURE — 85025 COMPLETE CBC W/AUTO DIFF WBC: CPT

## 2020-02-21 PROCEDURE — 36415 COLL VENOUS BLD VENIPUNCTURE: CPT

## 2020-02-21 PROCEDURE — 25000003 PHARM REV CODE 250: Performed by: INTERNAL MEDICINE

## 2020-02-21 PROCEDURE — 99232 PR SUBSEQUENT HOSPITAL CARE,LEVL II: ICD-10-PCS | Mod: ,,, | Performed by: INTERNAL MEDICINE

## 2020-02-21 PROCEDURE — 76937 US GUIDE VASCULAR ACCESS: CPT

## 2020-02-21 PROCEDURE — 11000001 HC ACUTE MED/SURG PRIVATE ROOM

## 2020-02-21 PROCEDURE — 25000003 PHARM REV CODE 250: Performed by: HOSPITALIST

## 2020-02-21 RX ORDER — SODIUM CHLORIDE 0.9 % (FLUSH) 0.9 %
10 SYRINGE (ML) INJECTION
Status: DISCONTINUED | OUTPATIENT
Start: 2020-02-21 | End: 2020-02-23 | Stop reason: HOSPADM

## 2020-02-21 RX ORDER — SODIUM CHLORIDE 0.9 % (FLUSH) 0.9 %
10 SYRINGE (ML) INJECTION EVERY 6 HOURS
Status: DISCONTINUED | OUTPATIENT
Start: 2020-02-21 | End: 2020-02-23 | Stop reason: HOSPADM

## 2020-02-21 RX ADMIN — APIXABAN 5 MG: 5 TABLET, FILM COATED ORAL at 08:02

## 2020-02-21 RX ADMIN — METOPROLOL SUCCINATE 50 MG: 50 TABLET, EXTENDED RELEASE ORAL at 10:02

## 2020-02-21 RX ADMIN — Medication 10 ML: at 06:02

## 2020-02-21 RX ADMIN — APIXABAN 5 MG: 5 TABLET, FILM COATED ORAL at 10:02

## 2020-02-21 RX ADMIN — Medication 6 MG: at 10:02

## 2020-02-21 RX ADMIN — AMIODARONE HYDROCHLORIDE 100 MG: 100 TABLET ORAL at 08:02

## 2020-02-21 RX ADMIN — CEFAZOLIN 2 G: 1 INJECTION, POWDER, FOR SOLUTION INTRAMUSCULAR; INTRAVENOUS at 02:02

## 2020-02-21 RX ADMIN — Medication 10 ML: at 12:02

## 2020-02-21 RX ADMIN — TAMSULOSIN HYDROCHLORIDE 0.4 MG: 0.4 CAPSULE ORAL at 08:02

## 2020-02-21 RX ADMIN — CEFAZOLIN 2 G: 1 INJECTION, POWDER, FOR SOLUTION INTRAMUSCULAR; INTRAVENOUS at 10:02

## 2020-02-21 RX ADMIN — CEFAZOLIN 2 G: 1 INJECTION, POWDER, FOR SOLUTION INTRAMUSCULAR; INTRAVENOUS at 06:02

## 2020-02-21 RX ADMIN — CETIRIZINE HYDROCHLORIDE 5 MG: 5 TABLET ORAL at 08:02

## 2020-02-21 RX ADMIN — CANDESARTAN 8 MG: 4 TABLET ORAL at 08:02

## 2020-02-21 NOTE — PROGRESS NOTES
[facility] Hospital Medicine  Progress Note    Patient Name: Antonio Gross  MRN: [id number]  Patient Class: IP- Inpatient   Admission Date: 2/15/2020  Length of Stay: 6 days  Attending Physician: Nancy Arteaga MD  Primary Care Provider: Primary Doctor     Hospital Medicine Team: [facility] HOSP MED D Nancy Arteaga MD    Subjective:     Principal Problem:Pyelonephritis    Interval History:  Chief Complaint   Patient presents with    Multiple Complaints     thurs morning noted unsteady, diarrhea     Follow up visit for Pyelonephritis    History / Events Overnight: No significant events reported by Nursing. Patient with no new complaints.    Data reviewed 2/21/2020:  No leukocytosis.     Review of Systems   Constitutional: Negative for fever.   Respiratory: Negative for shortness of breath.      Objective:     Vital Signs (Most Recent):  Temp: 96.7 °F (35.9 °C) (02/21/20 1701)  Pulse: (!) 46 (02/21/20 1701)  Resp: 16 (02/21/20 1701)  BP: (!) 163/85 (02/21/20 1701)  SpO2: 100 % (02/21/20 1701) Vital Signs (24h Range):  Temp:  [96.7 °F (35.9 °C)-98.6 °F (37 °C)] 96.7 °F (35.9 °C)  Pulse:  [45-51] 46  Resp:  [16-18] 16  SpO2:  [94 %-100 %] 100 %  BP: (153-167)/(74-85) 163/85     Weight: 86.2 kg (190 lb)  Body mass index is 24.39 kg/m².    Intake/Output Summary (Last 24 hours) at 2/21/2020 1718  Last data filed at 2/20/2020 1800  Gross per 24 hour   Intake 720 ml   Output --   Net 720 ml      Physical Exam   Constitutional: He is cooperative. No distress.   Eyes: Conjunctivae and lids are normal. No scleral icterus.   Cardiovascular: Regular rhythm, S1 normal and S2 normal. Bradycardia present.   Pulmonary/Chest: Effort normal and breath sounds normal. No respiratory distress. He has no wheezes. He has no rhonchi.   Abdominal: Soft. Normal appearance and bowel sounds are normal.   Musculoskeletal: Normal range of motion. He exhibits edema.   Neurological: He is alert. He is not  disoriented.   Skin: Skin is warm and dry. No cyanosis. No pallor.   Venous stasis dermatitis present     Significant Labs:   A1C:   Recent Labs   Lab 02/16/20  0502   HGBA1C 4.3     CBC:   Recent Labs   Lab 02/20/20  0919 02/21/20  0852   WBC 7.13 6.27   HGB 11.7* 12.4*   HCT 35.9* 37.1*    201     CMP:   Recent Labs   Lab 02/20/20  0919 02/21/20  0852   * 135*   K 3.8 3.7    104   CO2 23 24   * 112*   BUN 16 14   CREATININE 0.8 0.9   CALCIUM 9.1 9.1   PROT 6.7 7.2   ALBUMIN 2.5* 2.7*   BILITOT 1.0 1.0   ALKPHOS 80 84   AST 62* 66*   ALT 56* 51*   ANIONGAP 6* 7*   EGFRNONAA >60.0 >60.0     Significant Imaging:   CT A/P: 2/15/2020    1. Obstructing calculus at the right ureteral pelvic junction measuring 5 mm associated with mild right hydronephrosis, renomegaly, and perinephric stranding.  2. Hepatosplenomegaly with slight nodularity of the liver suggesting the possibility of cirrhosis.  Correlate with liver function tests  3. Two indeterminate enhancing liver lesions.  Abdominal MRI is recommended for further characterization.  4. 1.3 cm right adrenal nodule with nonspecific enhancement characteristics.  This could also be further evaluated with abdominal MRI.  5. Postsurgical change of prior prostatectomy  6. Fat containing umbilical hernia and bilateral inguinal hernias       Assessment/Plan:      Active Diagnoses:    Diagnosis Date Noted POA    PRINCIPAL PROBLEM:  Pyelonephritis [N12] 02/15/2020 Yes    Sepsis [A41.9] 02/20/2020 Yes    ALBERTO (acute kidney injury) [N17.9] 02/20/2020 Yes    Abnormal finding on imaging of liver [R93.2] 02/20/2020 Yes    Bacteremia due to Staphylococcus [R78.81] 02/17/2020 Yes    Right ureteral stone [N20.1] 02/15/2020 Yes    Atrial fibrillation [I48.91] 02/15/2020 Yes    Current use of long term anticoagulation [Z79.01] 02/15/2020 Not Applicable    Essential hypertension [I10] 02/15/2020 Yes      Problems Resolved During this Admission:        Overview / ICU Course:    Antonio Gross is a 69 y.o. male with medical history significant for HTN, atrial fibrillation (on Eliquis), prostate cancer s/p RRP in 2013 admitted for Pyelonephritis associated with nephrolithiasis.    Inpatient Medications Prescribed for Management of Current Problems:     Scheduled Meds:    amiodarone  100 mg Oral Daily    apixaban  5 mg Oral BID    candesartan  8 mg Oral Daily    ceFAZolin (ANCEF) IVPB  2 g Intravenous Q8H    cetirizine  5 mg Oral Daily    metoprolol succinate  50 mg Oral QHS    sodium chloride 0.9%  10 mL Intravenous Q6H    sodium phosphate IVPB  20.01 mmol Intravenous Once    tamsulosin  0.4 mg Oral Daily     Continuous Infusions:   As Needed: acetaminophen, Dextrose 10% Bolus, Dextrose 10% Bolus, glucagon (human recombinant), glucose, glucose, hydrALAZINE, hydrOXYzine HCl, loperamide, melatonin, morphine, ondansetron, ondansetron, oxybutynin, oxyCODONE, promethazine (PHENERGAN) IVPB, senna-docusate 8.6-50 mg, sodium chloride 0.9%, Flushing PICC Protocol **AND** sodium chloride 0.9% **AND** sodium chloride 0.9%    Assessment and Plan by Problem    Sepsis due to pyelonephritis of right kidney   MSSA bacteremia   Obstructing right proximal ureteral stone   Hydronephrosis of right kidney   - febrile, tachycpneic, normotensive, sating well on room air on admission   - elevated WBC and procalcitonin; now down trending  - lactate wnl  - CXR negative   - CT A/P showed obstructing 5 mm calculus at the right UPJ associated with mild hydronephrosis, renomegaly and perinephric stranding.   - U/A positive for UTI.  - blood and urine cultures growing MSSA  - repeat blood culture (2/17) no growth   - ID consulted.  -- Discontinued Vancomycin and started Cefazolin for MSSA on 2/18  -- TTE negative for vegetation   -- Anticipate Cefazolin 2 g IV q 8 hours x 6 weeks   - Continue cefazolin 2 g IV q 8 hours while inpatient   - on discharge transition to  cefazolin 6g IV continuous infusion q24 hours, plan for 6 week course, last day 3/26/2020 (will need to ensure patient receives 2 weeks after ureteral stone removal)  -- Patient will need CBC and CMP to be drawn weekly with results faxed to the ID Department at  [phone] and a follow-up appointment in Infectious Diseases clinic with Dr. Gamino in 2 weeks.  - Urology consulted.   -- s/p class A right ureteral stent placement on 2/15  -- Plan for outpatient ureteroscopy once infection is adequately treated to definitively treat ureteral stone prior to antibiotic end date in 6 weeks.     Hyponatremia  - NA on admit, 128   - likely due to hypovolemia  - off NS @ 100 cc/hr  - Problem is resolving; continuing to monitor serial electrolytes 2/21/2020.      Acute renal insufficiency - resolved   - baseline creatinine not documented   - s/p IVF     Hypomagnesemia   Hypophosphatemia   - replete prn      HTN  - resumed home metoprolol nightly and candesartan daily      Atrial fibrillation   - on home Eliquis and metoprolol       Right adrenal nodule  - Outpatient Endocrinology follow up     Allergies  - continue home antihistamines    Abnormal finding on imaging of liver  CT revealed Hepatosplenomegaly with slight nodularity of the liver suggesting the possibility of cirrhosis and two indeterminate enhancing liver lesions.  Abdominal MRI recommended for further characterization.    Discharge plan and follow up  IV Therapy Provider  THERON 2/21/2020  Previous admission:     Goals of Care:  General Prognosis: good  Goals: Curative  Comfort Only: No  Hospice: No  Code Status: Full Code    Diet: Diet [facility]; Low Fiber  GI Prophylaxis: Not indicated  Significant LDAs:   IV Access Type: Mid-line  Urinary Catheter Indication if present: Patient Does Not Have Urinary Catheter  Other Lines/Tubes/Drains:    VTE Risk Mitigation (From admission, onward)         Ordered     apixaban tablet 5 mg  2 times daily       02/15/20 2046     Place sequential compression device  Until discontinued      02/15/20 1746     IP VTE HIGH RISK PATIENT  Once      02/15/20 1702     Place sequential compression device  Until discontinued      02/15/20 1702     Place DRE hose  Until discontinued      02/15/20 1652                Nancy Arteaga MD  Department of Hospital Medicine   [facility]

## 2020-02-21 NOTE — PLAN OF CARE
"Sw met with Pt, spouse and son in the room. Asked Sw about coverage with home health and [employer] insurance. Sw informed home health will come out 3 x a week via the Medicare guidelines not matter the insurance and any other amenities would be an out of pocket expensive. Spouse very frustrated "[place] and [place] the [place] that is like this." Sw attempted to redirect spouse to focus on d.c needs and what Sw can offer from infusion company with [employer] and home health nurse. Spouse/Pt are wanting to hire an infusion nurse/nurse at home to help with Pt and administer medications, Sw informed that  nurse and infusion company can manage the IV abx at home until the Pt/family feel comfortable. Spouse informed Sw "we do not feel comfortable," Sw informed of LTAC placement as a back up option which the spouse informed "he will just get sick again." Sw to provide disc of Pt's images to family and follow with d.c when stable.   "

## 2020-02-21 NOTE — ASSESSMENT & PLAN NOTE
69-year-old male with history of afib on eliquis, HTN, bilateral LE chronic venous stasis, admitted with sepsis found to have MSSA pyelonephritis with right-sided obstructing ureteral stone s/p ureteral stent placement on 2/15/2020 with notable purulence c/b MSSA bacteremia, clinically improved after initiation of cefazolin IV.    Unclear source of MSSA bacteremia - patient with no recent surgeries or procedures, noted to have multiple skin lesions related to his chronic venous stasis which could have been a point of entry.  MSSA is not a urinary pathogen - suspect MSSA infection initiated an endovascular source with resulting seeding to his urinary tract. Lower suspicion for endocarditis as blood cultures cleared immediately, also TTE with no valvular lesions - will treat conservatively with 6 weeks of IV cefazolin.  At this point, ureteral stone and stent remain ongoing niduses of infection - recommend at least two weeks of cefazolin after urological procedure to remove ureteral stone.          Recommendations   - Continue cefazolin 2 g IV q 8 hours while inpatient - on discharge transition to cefazolin 6g IV continuous infusion q24 hours, plan for 6 week course, last day 3/26/2020 (will need to ensure patient receives 2 weeks after ureteral stone removal)    Patient will need CBC and CMP to be drawn weekly with results faxed to Dr. Gamino in the ID Department at [phone]    Patient has follow-up with Dr. Gamino on 3/13/2020 at 3:30 PM.    Case also discussed with PCP Dr. Kowalski and ID physician Dr. Hough in [place]

## 2020-02-21 NOTE — SUBJECTIVE & OBJECTIVE
Interval History:   No acute events overnight  PICC line placed    Review of Systems   Constitutional: Negative for chills, diaphoresis and fever.   HENT: Negative for rhinorrhea and sore throat.    Respiratory: Negative for cough and shortness of breath.    Cardiovascular: Negative for chest pain and leg swelling.   Gastrointestinal: Negative for abdominal pain, diarrhea, nausea and vomiting.   Genitourinary: Positive for flank pain. Negative for dysuria and hematuria.   Musculoskeletal: Negative for arthralgias and myalgias.   Skin: Negative for rash.   Neurological: Negative for headaches.     Objective:     Vital Signs (Most Recent):  Temp: 97.7 °F (36.5 °C) (02/21/20 0752)  Pulse: (!) 50 (02/21/20 0752)  Resp: 16 (02/21/20 0752)  BP: (!) 158/74 (02/21/20 0752)  SpO2: 97 % (02/21/20 0752) Vital Signs (24h Range):  Temp:  [97.4 °F (36.3 °C)-98.6 °F (37 °C)] 97.7 °F (36.5 °C)  Pulse:  [45-51] 50  Resp:  [16-18] 16  SpO2:  [94 %-98 %] 97 %  BP: (153-167)/(74-83) 158/74     Weight: 86.2 kg (190 lb)  Body mass index is 24.39 kg/m².    Estimated Creatinine Clearance: 87.5 mL/min (based on SCr of 0.9 mg/dL).    Physical Exam   Constitutional: He is oriented to person, place, and time. He appears well-developed and well-nourished. No distress.   HENT:   Head: Normocephalic and atraumatic.   Eyes: Conjunctivae and EOM are normal.   Neck: Normal range of motion. Neck supple.   Pulmonary/Chest: Effort normal. No respiratory distress.   Abdominal: Soft. He exhibits no distension.   Musculoskeletal: Normal range of motion. He exhibits no edema.   Neurological: He is alert and oriented to person, place, and time.   Skin: Skin is warm and dry. No rash noted. He is not diaphoretic. No erythema.   Hyperpigmentation on anterior bilateral lower extremities   Psychiatric: He has a normal mood and affect. His behavior is normal.   Vitals reviewed.      Significant Labs: All pertinent labs within the past 24 hours have been  reviewed.    Significant Imaging: I have reviewed all pertinent imaging results/findings within the past 24 hours.

## 2020-02-21 NOTE — PLAN OF CARE
02/21/20 0852   Post-Acute Status   Post-Acute Authorization Home Health/Hospice   Home Health/Hospice Status Awaiting Internal Medical Clearance

## 2020-02-21 NOTE — PROGRESS NOTES
[facility]  Infectious Disease  Progress Note    Patient Name: Antonio Gross  MRN: [id number]  Admission Date: 2/15/2020  Length of Stay: 6 days  Attending Physician: Nancy Arteaga MD  Primary Care Provider: Primary Doctor No    Isolation Status: No active isolations  Assessment/Plan:      Bacteremia due to Staphylococcus  69-year-old male with history of afib on eliquis, HTN, bilateral LE chronic venous stasis, admitted with sepsis found to have MSSA pyelonephritis with right-sided obstructing ureteral stone s/p ureteral stent placement on 2/15/2020 with notable purulence c/b MSSA bacteremia, clinically improved after initiation of cefazolin IV.    Unclear source of MSSA bacteremia - patient with no recent surgeries or procedures, noted to have multiple skin lesions related to his chronic venous stasis which could have been a point of entry.  MSSA is not a urinary pathogen - suspect MSSA infection initiated an endovascular source with resulting seeding to his urinary tract. Lower suspicion for endocarditis as blood cultures cleared immediately, also TTE with no valvular lesions - will treat conservatively with 6 weeks of IV cefazolin.  At this point, ureteral stone and stent remain ongoing niduses of infection - recommend at least two weeks of cefazolin after urological procedure to remove ureteral stone.          Recommendations   - Continue cefazolin 2 g IV q 8 hours while inpatient - on discharge transition to cefazolin 6g IV continuous infusion q24 hours, plan for 6 week course, last day 3/26/2020 (will need to ensure patient receives 2 weeks after ureteral stone removal)    Patient will need CBC and CMP to be drawn weekly with results faxed to Dr. Gamino in the ID Department at [phone]    Patient has follow-up with Dr. Gamino on 3/13/2020 at 3:30 PM.    Case also discussed with PCP Dr. Kowalski and ID physician Dr. Hough in [place]        Anticipated Disposition: Tomorrow    Thank  you for your consult. I will sign off. Please contact us if you have any additional questions.    Mariaelena Gamino MD  Infectious Disease  [facility]    Subjective:     Principal Problem:Pyelonephritis    HPI: Mr. Gloria is a 69 year old gentleman with PMH of HTN, A fib, prostate cancer s/p RRP in 2013 who [facility] [place] [facility] with a 3 day history of generalized weakness, fever and decreased po intake. He also notes right flank and lower abdominal pain. He denies chills, nausea, vomiting, chest pain, shortness of breath or lower extremity swelling.  No diarrhea.     In the ED, he was febrile to 102.5F, tachypneic. WBC elevated at 18.8 and SCr was 1.4. Lactate normal. U/A showed > 100 WBC. CXR showed atelectasis without evidence of infection. CT A/P with contrast  revealed an obstructing calculus at the right ureteral pelvic junction measuring 5 mm associated with mild right hydronephrosis, renomegaly, and perinephric stranding. Urology consulted and patient is now s/p right ureteral stent placement on 2/15. Per op note, purulent drainage was seen.    Admission blood cultures are positive for 4/4 bottles with Staph species. Urine cx with CONS. Repeat urine cx 2/15 obtained during procedure with multiple organisms isolated. He denies recent newman placement or instrumentation prior to admission. No recent procedures or dental work. No hardware.    He has been on Vanc and Zosyn. Fevers resolved. WBC trending down. Abdominal pain stable. Reports episode of incontinence today.    The patient and his wife (professor at [employer]) live between [place] and [place]. Reports recent diagnosis of A fib and HF. Had a few procedures in 2018 and 2019 related to this, including cardioversion. Denies cuts/abrasions thugh does have hyperpigmentation of lower extremities and scrathces frequently.   Interval History:   No acute events overnight  PICC line placed    Review of Systems   Constitutional: Negative for chills,  diaphoresis and fever.   HENT: Negative for rhinorrhea and sore throat.    Respiratory: Negative for cough and shortness of breath.    Cardiovascular: Negative for chest pain and leg swelling.   Gastrointestinal: Negative for abdominal pain, diarrhea, nausea and vomiting.   Genitourinary: Positive for flank pain. Negative for dysuria and hematuria.   Musculoskeletal: Negative for arthralgias and myalgias.   Skin: Negative for rash.   Neurological: Negative for headaches.     Objective:     Vital Signs (Most Recent):  Temp: 97.7 °F (36.5 °C) (02/21/20 0752)  Pulse: (!) 50 (02/21/20 0752)  Resp: 16 (02/21/20 0752)  BP: (!) 158/74 (02/21/20 0752)  SpO2: 97 % (02/21/20 0752) Vital Signs (24h Range):  Temp:  [97.4 °F (36.3 °C)-98.6 °F (37 °C)] 97.7 °F (36.5 °C)  Pulse:  [45-51] 50  Resp:  [16-18] 16  SpO2:  [94 %-98 %] 97 %  BP: (153-167)/(74-83) 158/74     Weight: 86.2 kg (190 lb)  Body mass index is 24.39 kg/m².    Estimated Creatinine Clearance: 87.5 mL/min (based on SCr of 0.9 mg/dL).    Physical Exam   Constitutional: He is oriented to person, place, and time. He appears well-developed and well-nourished. No distress.   HENT:   Head: Normocephalic and atraumatic.   Eyes: Conjunctivae and EOM are normal.   Neck: Normal range of motion. Neck supple.   Pulmonary/Chest: Effort normal. No respiratory distress.   Abdominal: Soft. He exhibits no distension.   Musculoskeletal: Normal range of motion. He exhibits no edema.   Neurological: He is alert and oriented to person, place, and time.   Skin: Skin is warm and dry. No rash noted. He is not diaphoretic. No erythema.   Hyperpigmentation on anterior bilateral lower extremities   Psychiatric: He has a normal mood and affect. His behavior is normal.   Vitals reviewed.      Significant Labs: All pertinent labs within the past 24 hours have been reviewed.    Significant Imaging: I have reviewed all pertinent imaging results/findings within the past 24 hours.

## 2020-02-21 NOTE — CONSULTS
Double lumen PICC placed in right brachial vein of MELVIN, 34cm in length with 0cm exposed and 31cm arm circumference. Lot#[id number].

## 2020-02-21 NOTE — PROCEDURES
"Antonio Gross is a 69 y.o. male patient.    Temp: 97.7 °F (36.5 °C) (02/21/20 0752)  Pulse: (!) 50 (02/21/20 0752)  Resp: 16 (02/21/20 0752)  BP: (!) 158/74 (02/21/20 0752)  SpO2: 97 % (02/21/20 0752)  Weight: 86.2 kg (190 lb) (02/18/20 1520)  Height: 6' 2" (188 cm) (02/18/20 1520)    PICC  Date/Time: 2/21/2020 11:07 AM  Performed by: Jaylen Hilario RN  Assisting provider: Asia Bauer LPN  Consent Done: Yes  Time out: Immediately prior to procedure a time out was called to verify the correct patient, procedure, equipment, support staff and site/side marked as required  Indications: vascular access and med administration  Anesthesia: local infiltration  Local anesthetic: lidocaine 1% without epinephrine  Anesthetic Total (mL): 3  Preparation: skin prepped with ChloraPrep  Skin prep agent dried: skin prep agent completely dried prior to procedure  Sterile barriers: all five maximum sterile barriers used - cap, mask, sterile gown, sterile gloves, and large sterile sheet  Hand hygiene: hand hygiene performed prior to central venous catheter insertion  Location details: right brachial  Catheter type: double lumen  Catheter size: 5 Fr  Catheter Length: 34cm    Ultrasound guidance: yes  Vessel Caliber: medium and patent, compressibility normal  Vascular Doppler: not done  Needle advanced into vessel with real time Ultrasound guidance.  Guidewire confirmed in vessel.  Image recorded and saved.  Sterile sheath used.  Number of attempts: 1  Post-procedure: blood return through all ports, chlorhexidine patch and sterile dressing applied  Technical procedures used: 3cg  Specimens: No  Implants: No  Assessment: placement verified by x-ray  Complications: none          Asia Bauer  2/21/2020  "

## 2020-02-22 VITALS
HEIGHT: 74 IN | DIASTOLIC BLOOD PRESSURE: 81 MMHG | BODY MASS INDEX: 24.38 KG/M2 | HEART RATE: 46 BPM | RESPIRATION RATE: 18 BRPM | TEMPERATURE: 98 F | SYSTOLIC BLOOD PRESSURE: 166 MMHG | WEIGHT: 190 LBS | OXYGEN SATURATION: 99 %

## 2020-02-22 PROBLEM — N17.9 AKI (ACUTE KIDNEY INJURY): Status: RESOLVED | Noted: 2020-02-20 | Resolved: 2020-02-22

## 2020-02-22 PROBLEM — R93.2 ABNORMAL FINDING ON IMAGING OF LIVER: Status: RESOLVED | Noted: 2020-02-20 | Resolved: 2020-02-22

## 2020-02-22 PROBLEM — A41.9 SEPSIS: Status: RESOLVED | Noted: 2020-02-20 | Resolved: 2020-02-22

## 2020-02-22 LAB
ALBUMIN SERPL BCP-MCNC: 2.9 G/DL (ref 3.5–5.2)
ALP SERPL-CCNC: 89 U/L (ref 55–135)
ALT SERPL W/O P-5'-P-CCNC: 46 U/L (ref 10–44)
ANION GAP SERPL CALC-SCNC: 6 MMOL/L (ref 8–16)
AST SERPL-CCNC: 64 U/L (ref 10–40)
BACTERIA BLD CULT: NORMAL
BACTERIA BLD CULT: NORMAL
BASOPHILS # BLD AUTO: 0.06 K/UL (ref 0–0.2)
BASOPHILS NFR BLD: 1.1 % (ref 0–1.9)
BILIRUB SERPL-MCNC: 1 MG/DL (ref 0.1–1)
BUN SERPL-MCNC: 12 MG/DL (ref 8–23)
CALCIUM SERPL-MCNC: 9.5 MG/DL (ref 8.7–10.5)
CHLORIDE SERPL-SCNC: 104 MMOL/L (ref 95–110)
CO2 SERPL-SCNC: 24 MMOL/L (ref 23–29)
CREAT SERPL-MCNC: 0.9 MG/DL (ref 0.5–1.4)
DIFFERENTIAL METHOD: ABNORMAL
EOSINOPHIL # BLD AUTO: 0.1 K/UL (ref 0–0.5)
EOSINOPHIL NFR BLD: 1.1 % (ref 0–8)
ERYTHROCYTE [DISTWIDTH] IN BLOOD BY AUTOMATED COUNT: 13.1 % (ref 11.5–14.5)
EST. GFR  (AFRICAN AMERICAN): >60 ML/MIN/1.73 M^2
EST. GFR  (NON AFRICAN AMERICAN): >60 ML/MIN/1.73 M^2
GLUCOSE SERPL-MCNC: 102 MG/DL (ref 70–110)
HCT VFR BLD AUTO: 42.3 % (ref 40–54)
HGB BLD-MCNC: 13.2 G/DL (ref 14–18)
IMM GRANULOCYTES # BLD AUTO: 0.09 K/UL (ref 0–0.04)
IMM GRANULOCYTES NFR BLD AUTO: 1.7 % (ref 0–0.5)
LYMPHOCYTES # BLD AUTO: 1.1 K/UL (ref 1–4.8)
LYMPHOCYTES NFR BLD: 20.7 % (ref 18–48)
MCH RBC QN AUTO: 33.8 PG (ref 27–31)
MCHC RBC AUTO-ENTMCNC: 31.2 G/DL (ref 32–36)
MCV RBC AUTO: 108 FL (ref 82–98)
MONOCYTES # BLD AUTO: 0.5 K/UL (ref 0.3–1)
MONOCYTES NFR BLD: 9.6 % (ref 4–15)
NEUTROPHILS # BLD AUTO: 3.6 K/UL (ref 1.8–7.7)
NEUTROPHILS NFR BLD: 65.8 % (ref 38–73)
NRBC BLD-RTO: 0 /100 WBC
PLATELET # BLD AUTO: 193 K/UL (ref 150–350)
PMV BLD AUTO: 11.7 FL (ref 9.2–12.9)
POTASSIUM SERPL-SCNC: 3.8 MMOL/L (ref 3.5–5.1)
PROT SERPL-MCNC: 7.8 G/DL (ref 6–8.4)
RBC # BLD AUTO: 3.91 M/UL (ref 4.6–6.2)
SODIUM SERPL-SCNC: 134 MMOL/L (ref 136–145)
WBC # BLD AUTO: 5.41 K/UL (ref 3.9–12.7)

## 2020-02-22 PROCEDURE — 85025 COMPLETE CBC W/AUTO DIFF WBC: CPT

## 2020-02-22 PROCEDURE — A4216 STERILE WATER/SALINE, 10 ML: HCPCS | Performed by: INTERNAL MEDICINE

## 2020-02-22 PROCEDURE — 25000003 PHARM REV CODE 250: Performed by: INTERNAL MEDICINE

## 2020-02-22 PROCEDURE — 99239 HOSP IP/OBS DSCHRG MGMT >30: CPT | Mod: ,,, | Performed by: INTERNAL MEDICINE

## 2020-02-22 PROCEDURE — 80053 COMPREHEN METABOLIC PANEL: CPT

## 2020-02-22 PROCEDURE — 36415 COLL VENOUS BLD VENIPUNCTURE: CPT

## 2020-02-22 PROCEDURE — 25000003 PHARM REV CODE 250: Performed by: HOSPITALIST

## 2020-02-22 PROCEDURE — 25000003 PHARM REV CODE 250: Performed by: STUDENT IN AN ORGANIZED HEALTH CARE EDUCATION/TRAINING PROGRAM

## 2020-02-22 PROCEDURE — 63600175 PHARM REV CODE 636 W HCPCS: Performed by: PHYSICIAN ASSISTANT

## 2020-02-22 PROCEDURE — 99239 PR HOSPITAL DISCHARGE DAY,>30 MIN: ICD-10-PCS | Mod: ,,, | Performed by: INTERNAL MEDICINE

## 2020-02-22 RX ADMIN — CEFAZOLIN 2 G: 1 INJECTION, POWDER, FOR SOLUTION INTRAMUSCULAR; INTRAVENOUS at 07:02

## 2020-02-22 RX ADMIN — Medication 10 ML: at 07:02

## 2020-02-22 RX ADMIN — CETIRIZINE HYDROCHLORIDE 5 MG: 5 TABLET ORAL at 10:02

## 2020-02-22 RX ADMIN — AMIODARONE HYDROCHLORIDE 100 MG: 100 TABLET ORAL at 10:02

## 2020-02-22 RX ADMIN — Medication 1 CAPSULE: at 09:02

## 2020-02-22 RX ADMIN — TAMSULOSIN HYDROCHLORIDE 0.4 MG: 0.4 CAPSULE ORAL at 10:02

## 2020-02-22 RX ADMIN — CEFAZOLIN 2 G: 1 INJECTION, POWDER, FOR SOLUTION INTRAMUSCULAR; INTRAVENOUS at 02:02

## 2020-02-22 RX ADMIN — Medication 10 ML: at 12:02

## 2020-02-22 RX ADMIN — APIXABAN 5 MG: 5 TABLET, FILM COATED ORAL at 10:02

## 2020-02-22 RX ADMIN — CANDESARTAN 8 MG: 4 TABLET ORAL at 10:02

## 2020-02-22 NOTE — PLAN OF CARE
02/22/20 1135   Post-Acute Status   Post-Acute Authorization Home Health/Hospice   Post-Acute Placement Status Referrals Sent     SW spoke to patients daughter Alisa who reported she had no preference for a  agency. SW sent referrals.    Cassie Bennett LMSW  [facility]  [phone]

## 2020-02-22 NOTE — PLAN OF CARE
02/22/20 1202   Post-Acute Status   Post-Acute Authorization Home Health/Hospice   Post-Acute Placement Status Set-up Complete   Discharge Plan   Discharge Plan A Home Health     Set up complete with [facility] and [facility].     Cassie Bennett LMSW  [facility]  [phone]

## 2020-02-22 NOTE — NURSING
"Hematoma noted on pt inner left upper arm after midline removal. No redness, no drainage, no warmth noted.  Patient stated he felt " a little bit of pain" upon touch.  MD notified, placed heat pack on affected area per orders.  Will continue to monitor.  "

## 2020-02-22 NOTE — PLAN OF CARE
[facility]    HOME HEALTH ORDERS  FACE TO FACE ENCOUNTER    Patient Name: Antonio Gross  YOB: 1950    PCP:  PCP Dr. Kowalski and ID physician Dr. Hough in [place], [place]  PCP Address: None  PCP Phone Number: None  PCP Fax: None    Encounter Date: 02/22/2020    Admit to Home Health    Diagnoses:  Active Hospital Problems    Diagnosis  POA    *Pyelonephritis [N12]  Yes    Abnormal finding on imaging of liver [R93.2]  Yes    Bacteremia due to Staphylococcus [R78.81]  Yes    Right ureteral stone [N20.1]  Yes    Atrial fibrillation [I48.91]  Yes    Current use of long term anticoagulation [Z79.01]  Not Applicable    Essential hypertension [I10]  Yes      Resolved Hospital Problems    Diagnosis Date Resolved POA    Sepsis [A41.9] 02/22/2020 Yes    ALBERTO (acute kidney injury) [N17.9] 02/22/2020 Yes       Future Appointments   Date Time Provider Department Center   3/12/2020  3:30 PM Mariaelena Gamino MD [facility] ID [facility] [place]     Follow-up Information     Alejandro Anguiano MD. Schedule an appointment as soon as possible for a visit in 2 weeks.    Specialty:  Urology  Why:  Follow-up for ureteroscopy surgery to remove ureteral stone.  Contact information:  [address] [facility] [id number] [phone]         Mariaelena Gamino MD. Schedule an appointment as soon as possible for a visit in 2 weeks.    Specialty:  Infectious Diseases  Why:  For discharge from hospital follow up, As previously planned  Contact information:  [address] [facility] [address]  [phone]             I have seen and examined this patient face to face today. My clinical findings that support the need for the home health skilled services and home bound status are the following:  Medical restrictions requiring assistance of another human to leave home due to  IV infusion Needs.    Allergies:Review of patient's allergies indicates:  No Known Allergies    Diet: cardiac diet and 2 gram  sodium diet     Activities: activity as tolerated    Nursing:   SN to complete comprehensive assessment including routine vital signs. Instruct on disease process and s/s of complications to report to MD. Review/verify medication list sent home with the patient at time of discharge  and instruct patient/caregiver as needed. Frequency may be adjusted depending on start of care date.    Notify MD if SBP > 160 or < 90; DBP > 90 or < 50; HR > 120 or < 50; Temp > 101    MISCELLANEOUS CARE:  Home Infusion Therapy:   SN to perform Infusion Therapy/Central Line Care.  Review Central Line Care & Central Line Flush with patient.    Administer (drug and dose): cefazolin 6g IV continuous infusion  Every 24 hours, plan for 6 week course, last day 3/30/2020 (will need to ensure patient receives 2-week course after ureteral stone removal)     CBC and CMP weekly with results faxed to Dr. Gamino in the ID Department at [phone]    Last dose given: 2/22/2020                       Home dose due: 2/22/2020    Scrub the Hub: Prior to accessing the line, always perform a 30 second alcohol scrub  Each lumen of the central line is to be flushed at least daily with 10 mL Normal Saline and 3 mL Heparin flush (10 units/mL)  Skilled Nurse (SN) may draw blood from IV access  Blood Draw Procedure:   - Aspirate at least 5 mL of blood   - Discard   - Obtain specimen   - Change injection cap   - Flush with 20 mL Normal Saline followed by a                 3-5 mL Heparin flush (10 units/mL)  Central :   - Sterile dressing changes are done weekly and as needed.   - Use chlor-hexadine scrub to cleanse site, apply Biopatch to insertion site,       apply securement device dressing   - Injection caps are changed weekly and after EVERY lab draw.   - If sterile gauze is under dressing to control oozing,                 dressing change must be performed every 24 hours until gauze is not needed.    Medications: Review discharge medications  with patient and family and provide education.      Current Discharge Medication List      START taking these medications    Details   dextrose 5 % SolP 500 mL with ceFAZolin 1 gram SolR 6 g Inject 6 g into the vein continuous.      Lactobacillus acidophilus Cap Take 2 capsules by mouth 2 (two) times daily.  Qty: 100 each, Refills: prn         CONTINUE these medications which have NOT CHANGED    Details   candesartan (ATACAND) 8 MG tablet Take 8 mg by mouth once daily.      furosemide (LASIX) 80 MG tablet Take 80 mg by mouth daily as needed (pt takes 1/2 - 1 tab as needed for fluid).      amiodarone HCl (AMIODARONE ORAL) Take 100 mg by mouth once daily.       apixaban (ELIQUIS ORAL) Take 5 mg by mouth 2 (two) times daily.       metoprolol succinate (TOPROL-XL) 50 MG 24 hr tablet Take 50 mg by mouth once daily.         STOP taking these medications       levocetirizine (XYZAL) 5 MG tablet Comments:   Reason for Stopping:               I certify that this patient is confined to his home and needs intermittent skilled nursing care.      Nancy Arteaga MD

## 2020-02-22 NOTE — DISCHARGE SUMMARY
[facility] Hospital Medicine  Discharge Summary      Patient Name: Antonio Gross  MRN: [id number]  Admission Date: 2/15/2020  Hospital Length of Stay: 7 days  Discharge Date and Time: 2/22/2020  7:38 PM  Attending Physician: Nancy Arteaga MD   Discharging Provider: Nancy Arteaga MD  Primary Care Provider: Primary Doctor [facility] Medicine Team: [facility]OS[facility] MED D Nancy Arteaga MD    HPI: 69 year old gentleman with PMH of HTN, atrial fibrillation (on Eliquis), prostate cancer s/p RRP in 2013 who presented to [facility]ED with 3 day history of generalized weakness, fatigue, and decreased po intake. He reports that he has had several episodes of diarrhea at the onset of his symptoms which have resolved. He also notes right flank and lower abdominal pain that is intermittent, non-radiating, crampy and 5/10 in severity. At baseline, the patient has a mild unsteady gait which has worsened due to the recent illness. He denies chills, nausea, vomiting, chest pain, shortness of breath or lower extremity swelling.   In the ED, he was febrile to 102.5F, tachypneic but not tachycardic. BP was stable and patient sating well on room air. WBC elevated at 18.8K and sCr was 1.4. Lactate normal and U/A showed > 100 WBC. CXR showed atelectasis without evidence of infection. CT A/P with contrast revealed mild hydronephrosis and obstructing 5 mm calculus at the right UPJ. The patient was started on Zosyn, blood cultures sent and Urology was consulted.     Procedure(s) (LRB):  CYSTOSCOPY, WITH URETERAL STENT INSERTION (Right)      Hospital Course: 69 y.o. male with medical history significant for HTN, atrial fibrillation (on Eliquis), prostate cancer s/p RRP in 2013 admitted for pyelonephritis associated with nephrolithiasis and MSSA bacteremia.    Sepsis due to pyelonephritis of right kidney   MSSA bacteremia   Obstructing right proximal ureteral stone   Hydronephrosis of right kidney   - febrile,  "tachycpneic, normotensive, sating well on room air on admission   - elevated WBC and procalcitonin; now down trending  - lactate wnl  - CXR negative   - CT A/P showed obstructing 5 mm calculus at the right UPJ associated with mild hydronephrosis, renomegaly and perinephric stranding.   - U/A positive for UTI.  - blood and urine cultures growing MSSA  - repeat blood culture (2/17) no growth   - ID consulted.  -- Discontinued Vancomycin and started Cefazolin for MSSA on 2/18  -- TTE negative for vegetation   -- Anticipate Cefazolin 2 g IV q 8 hours x 6 weeks   - Continue cefazolin 2 g IV q 8 hours while inpatient   - on discharge transition to cefazolin 6g IV continuous infusion q24 hours, plan for 6 week course, last day 3/26/2020 (will need to ensure patient receives 2 weeks after ureteral stone removal)  -- Patient will need CBC and CMP to be drawn weekly with results faxed to the ID Department at  [phone] and a follow-up appointment in Infectious Diseases clinic with Dr. Gamino in 2 weeks.  - PER ID: "Unclear source of MSSA bacteremia - patient with no recent surgeries or procedures, noted to have multiple skin lesions related to his chronic venous stasis which could have been a point of entry.  MSSA is not a urinary pathogen - suspect MSSA infection initiated an endovascular source with resulting seeding to his urinary tract. Lower suspicion for endocarditis as blood cultures cleared immediately, also TTE with no valvular lesions - will treat conservatively with 6 weeks of IV cefazolin.  At this point, ureteral stone and stent remain ongoing niduses of infection - recommend at least two weeks of cefazolin after urological procedure to remove ureteral stone."  - Urology consulted.   -- s/p class A right ureteral stent placement on 2/15  -- Plan for outpatient ureteroscopy once infection is adequately treated to definitively treat ureteral stone prior to antibiotic end date in 6 weeks.    Abnormal finding on " imaging of liver  CT revealed Hepatosplenomegaly with slight nodularity of the liver suggesting the possibility of cirrhosis and two indeterminate enhancing liver lesions.  Abdominal MRI recommended for further characterization.     Hyponatremia  - NA on admit, 128   - likely due to hypovolemia.      Acute renal insufficiency - resolved   - baseline creatinine not documented   - s/p IVF     Hypomagnesemia   Hypophosphatemia   - replete prn      HTN  - resumed home metoprolol nightly and candesartan daily      Atrial fibrillation   - on home Eliquis and metoprolol       Right adrenal nodule  - Outpatient Endocrinology follow up     Allergies  - continue home antihistamines       Consults:   Consults (From admission, onward)        Status Ordering Provider     [facility] Medicine PharmD Consult  Once     Provider:  (Not yet assigned)    Completed SHELTON CURRIE     Inpatient consult to Infectious Diseases  Once     Provider:  (Not yet assigned)    Completed SHELTON CURRIE     Inpatient consult to PICC team ([facility])  Once     Provider:  (Not yet assigned)    Completed SHELTON CURRIE     Inpatient consult to PICC team ([facility])  Once     Provider:  (Not yet assigned)    Completed VON HIGGINBOTHAM     Inpatient consult to PICC team ([facility])  Once     Provider:  (Not yet assigned)    Completed VON HIGGINBOTHAM     Inpatient consult to Urology  Once     Provider:  (Not yet assigned)    Completed WALTER GOMEZ          Final Active Diagnoses:    Diagnosis Date Noted POA    PRINCIPAL PROBLEM:  Pyelonephritis [N12] 02/15/2020 Yes    Bacteremia due to Staphylococcus [R78.81] 02/17/2020 Yes    Right ureteral stone [N20.1] 02/15/2020 Yes    Atrial fibrillation [I48.91] 02/15/2020 Yes    Current use of long term anticoagulation [Z79.01] 02/15/2020 Not Applicable    Essential hypertension [I10] 02/15/2020 Yes      Problems Resolved During this Admission:    Diagnosis Date Noted Date Resolved POA    Sepsis [A41.9]  02/20/2020 02/22/2020 Yes    ALBERTO (acute kidney injury) [N17.9] 02/20/2020 02/22/2020 Yes    Abnormal finding on imaging of liver [R93.2] 02/20/2020 02/22/2020 Yes      Discharged Condition: good    Disposition:  IV Therapy Provider    Follow Up:  Follow-up Information     Alejandro Anguiano MD. Schedule an appointment as soon as possible for a visit in 2 weeks.    Specialty:  Urology  Why:  Follow-up for ureteroscopy surgery to remove ureteral stone.  Contact information:  [address] [facility] [address]  [phone]             Mariaelena Gamino MD. Schedule an appointment as soon as possible for a visit in 2 weeks.    Specialty:  Infectious Diseases  Why:  For discharge from hospital follow up, As previously planned  Contact information:  [address] [facility] [address]  [phone]                 Patient Instructions:      Diet Cardiac     Notify your health care provider if you experience any of the following:  temperature >100.4     Notify your health care provider if you experience any of the following:  persistent nausea and vomiting or diarrhea     Notify your health care provider if you experience any of the following:  difficulty breathing or increased cough     Notify your health care provider if you experience any of the following:  persistent dizziness, light-headedness, or visual disturbances     Activity as tolerated     Medications:  Reconciled Home Medications:      Medication List      START taking these medications    dextrose 5 % SolP 500 mL with ceFAZolin 1 gram SolR 6 g  Inject 6 g into the vein continuous.     Lactobacillus acidophilus Cap  Take 2 capsules by mouth 2 (two) times daily.        CONTINUE taking these medications    AMIODARONE ORAL  Take 100 mg by mouth once daily.     candesartan 8 MG tablet  Commonly known as:  ATACAND  Take 8 mg by mouth once daily.     ELIQUIS ORAL  Take 5 mg by mouth 2 (two) times daily.     furosemide 80 MG tablet  Commonly known as:   LASIX  Take 80 mg by mouth daily as needed (pt takes 1/2 - 1 tab as needed for fluid).     metoprolol succinate 50 MG 24 hr tablet  Commonly known as:  TOPROL-XL  Take 50 mg by mouth once daily.            Significant Diagnostic Studies: Labs:      Recent Labs   Lab 02/21/20  0852 02/22/20  0856   * 134*   K 3.7 3.8    104   CO2 24 24   * 102   BUN 14 12   CREATININE 0.9 0.9   CALCIUM 9.1 9.5   PROT 7.2 7.8   ALBUMIN 2.7* 2.9*   BILITOT 1.0 1.0   ALKPHOS 84 89   AST 66* 64*   ALT 51* 46*   ANIONGAP 7* 6*   ESTGFRAFRICA >60.0 >60.0   EGFRNONAA >60.0 >60.0     Recent Labs   Lab 02/21/20  0852 02/22/20  0857   WBC 6.27 5.41   HGB 12.4* 13.2*   HCT 37.1* 42.3    193     Recent Labs   Lab 02/18/20  0839   TROPONINI 0.157*     Recent Labs   Lab 02/16/20  0502   HGBA1C 4.3     Microbiology:   Microbiology Results (last 7 days)     Procedure Component Value Units Date/Time    Blood culture [[id number]] Collected:  02/17/20 1208    Order Status:  Completed Specimen:  Blood Updated:  02/21/20 1612     Blood Culture, Routine No Growth to date      No Growth to date      No Growth to date      No Growth to date      No Growth to date    Narrative:       Collection has been rescheduled by DW1 at 02/17/2020 11:15 Reason:   patient in the shower   Collection has been rescheduled by DW1 at 02/17/2020 11:15 Reason:   patient in the shower     Blood culture [[id number]] Collected:  02/17/20 1208    Order Status:  Completed Specimen:  Blood Updated:  02/21/20 1612     Blood Culture, Routine No Growth to date      No Growth to date      No Growth to date      No Growth to date      No Growth to date    Narrative:       Collection has been rescheduled by DW1 at 02/17/2020 11:15 Reason:   patient in the shower   Collection has been rescheduled by DW1 at 02/17/2020 11:15 Reason:   patient in the shower     Blood culture x two cultures. Draw prior to antibiotics. [[id number]]  (Abnormal) Collected:  02/15/20 0996     Order Status:  Completed Specimen:  Blood from Peripheral, Forearm, Right Updated:  02/18/20 0913     Blood Culture, Routine Gram stain imelda bottle: Gram positive cocci in clusters resembling Staph       Results called to and read back by:Deyanira Nava RN 02/16/2020  06:04       Gram stain aer bottle: Gram positive cocci in clusters resembling Staph       02/16/2020  06:04      STAPHYLOCOCCUS AUREUS  ID consult required at [facility] and [facility].  For susceptibility see order #[id number]      Narrative:       Aerobic and anaerobic    Blood culture x two cultures. Draw prior to antibiotics. [[id number]]  (Abnormal)  (Susceptibility) Collected:  02/15/20 0918    Order Status:  Completed Specimen:  Blood from Peripheral, Forearm, Left Updated:  02/18/20 0910     Blood Culture, Routine Gram stain imelda bottle: Gram positive cocci in clusters resembling Staph       Positive results previously called 02/16/2020  06:05       Gram stain aer bottle: Gram positive cocci in clusters resembling Staph       02/16/2020  06:05      STAPHYLOCOCCUS AUREUS    Narrative:       Aerobic and anaerobic    Urine culture [[id number]]  (Abnormal)  (Susceptibility) Collected:  02/15/20 1145    Order Status:  Completed Specimen:  Urine Updated:  02/18/20 0820     Urine Culture, Routine STAPHYLOCOCCUS AUREUS  50,000 - 99,999 cfu/ml  No other significant isolate      Narrative:       Preferred Collection Type->Urine, Clean Catch    Urine Culture High Risk [[id number]] Collected:  02/15/20 1712    Order Status:  Completed Specimen:  Urine, Catheterized Updated:  02/16/20 2311     Urine Culture, Routine Multiple organisms isolated. None in predominance.  Repeat if      clinically necessary.    Narrative:       Indicated criteria for high risk culture:->Other  Other (specify):->septic stone        Radiology: X-Ray: CXR: X-Ray Chest 1 View (CXR):   Results for orders placed or performed during the hospital encounter of 02/15/20   X-Ray  Chest 1 View for PICC_Central line    Narrative    EXAMINATION:  XR CHEST 1 VIEW    CLINICAL HISTORY:  Evaluate PICC line placement;    FINDINGS:  Central line mid SVC.  Heart size is normal.  Lungs are clear.      Impression    No acute process seen.      Electronically signed by: Yunior Goyal MD  Date:    02/21/2020  Time:    15:01     Results for orders placed or performed during the hospital encounter of 02/15/20   CT Abdomen Pelvis With Contrast    Narrative    EXAMINATION:  CT ABDOMEN PELVIS WITH CONTRAST    CLINICAL HISTORY:  Weakness, diarrhea, abdominal pain.    TECHNIQUE:  Low dose axial images, sagittal and coronal reformations were obtained from the lung bases to the pubic symphysis following the IV administration of  Omnipaque 350 without oral contrast.    COMPARISON:  None    FINDINGS:  The lung bases are clear.    The liver is mildly enlarged measuring approximately 19 cm and there is a suggestion of slight peripheral nodularity..  Background hepatic parenchyma is diffusely low attenuation suggesting steatosis.  There is an enhancing lesion in hepatic segment 8 measuring 2 cm (2:19).  A 2nd similar masses noted adjacent to the gallbladder fossa measuring approximately 2.5 cm (02:40).  The gallbladder is nondistended.  The portal vein is patent.  The spleen is mildly enlarged, measuring 13 cm.    There is a 1.3 cm right adrenal nodule with nonspecific attenuation characteristics.    There is relative hypoattenuation of the right kidney with renomegaly mild hydronephrosis as well as perinephric stranding associated with an obstructing calculus at the right ureteropelvic junction measuring 5 mm.    Stomach and small bowel are nondilated.  The colon has normal appearance    The bladder has a normal appearance.  There is postsurgical change of prior prostatectomy.  There is a fat containing umbilical hernia.    There is no free air or free fluid in the abdomen or pelvis.  No adenopathy is seen.  There  are bilateral fat containing inguinal hernias.    There is osteopenia and degenerative change of the spine.      Impression    1. Obstructing calculus at the right ureteral pelvic junction measuring 5 mm associated with mild right hydronephrosis, renomegaly, and perinephric stranding.  2. Hepatosplenomegaly with slight nodularity of the liver suggesting the possibility of cirrhosis.  Correlate with liver function tests  3. Two indeterminate enhancing liver lesions.  Abdominal MRI is recommended for further characterization.  4. 1.3 cm right adrenal nodule with nonspecific enhancement characteristics.  This could also be further evaluated with abdominal MRI.  5. Postsurgical change of prior prostatectomy  6. Fat containing umbilical hernia and bilateral inguinal hernias  This report was flagged in Epic as abnormal.      Electronically signed by: Rosa Brizuela MD  Date:    02/15/2020  Time:    14:08     Cardiac Graphics: Echocardiogram:   Results for orders placed or performed during the hospital encounter of 02/15/20   Echo Color Flow Doppler? Yes   Result Value Ref Range    Ascending aorta 3.91 cm    STJ 3.37 cm    AV mean gradient 5 mmHg    Ao peak costa 1.65 m/s    Ao VTI 35.83 cm    IVRT 0.11 msec    IVS 0.94 0.6 - 1.1 cm    LA size 4.58 cm    Left Atrium Major Axis 6.27 cm    Left Atrium Minor Axis 6.53 cm    LVIDD 5.52 3.5 - 6.0 cm    LVIDS 3.79 2.1 - 4.0 cm    LVOT diameter 2.21 cm    LVOT peak VTI 26.82 cm    PW 1.04 0.6 - 1.1 cm    MV Peak A Costa 0.45 m/s    E wave decelartion time 213.65 msec    MV Peak E Costa 0.74 m/s    PV Peak D Costa 0.67 m/s    PV Peak S Costa 0.38 m/s    RA Major Axis 5.27 cm    RA Width 5.03 cm    RVDD 5.06 cm    Sinus 3.59 cm    TAPSE 3.28 cm    TR Max Costa 2.54 m/s    TDI LATERAL 0.09 m/s    TDI SEPTAL 0.05 m/s    LA WIDTH 5.81 cm    LV Diastolic Volume 148.87 mL    LV Systolic Volume 61.59 mL    RV S' 14.08 cm/s    LVOT peak costa 1.19 m/s    LV LATERAL E/E' RATIO 8.22 m/s    LV SEPTAL  E/E' RATIO 14.80 m/s    FS 31 %    LA volume 144.70 cm3    LV mass 211.66 g    Left Ventricle Relative Wall Thickness 0.38 cm    AV valve area 2.87 cm2    AV Velocity Ratio 0.72     AV index (prosthetic) 0.75     E/A ratio 1.64     Mean e' 0.07 m/s    Pulm vein S/D ratio 0.57     LVOT area 3.8 cm2    LVOT stroke volume 102.83 cm3    AV peak gradient 11 mmHg    E/E' ratio 10.57 m/s    LV Systolic Volume Index 29.0 mL/m2    LV Diastolic Volume Index 70.01 mL/m2    LA Volume Index 68.1 mL/m2    LV Mass Index 100 g/m2    Triscuspid Valve Regurgitation Peak Gradient 26 mmHg    BSA 2.12 m2    Right Atrial Pressure (from IVC) 3 mmHg    TV rest pulmonary artery pressure 29 mmHg    Narrative    · Mildly decreased left ventricular systolic function. The estimated   ejection fraction is 50%.  · Septal wall has abnormal motion.  · Local segmental wall motion abnormalities.  · Grade I (mild) left ventricular diastolic dysfunction consistent with   impaired relaxation.  · Severe left atrial enlargement.  · Mild right ventricular enlargement.  · Normal right ventricular systolic function.  · Mild right atrial enlargement.  · Mild mitral regurgitation.  · Mild aortic regurgitation.  · Normal central venous pressure (3 mmHg).  · The estimated PA systolic pressure is 29 mmHg.  · The ascending aorta is mildly dilated.        Indwelling Lines/Drains at time of discharge:   Lines/Drains/Airways     Peripherally Inserted Central Catheter Line            PICC Double Lumen 02/21/20 1105 right brachial 1 day                Time spent on the discharge of patient: 60 minutes  Patient was seen and examined on the date of discharge and determined to be suitable for discharge.  Disc with Imaging was given to patient and his wife.       Nancy Arteaga MD  Department of Hospital Medicine  [facility]

## 2020-02-23 PROCEDURE — G0180 MD CERTIFICATION HHA PATIENT: HCPCS | Mod: ,,, | Performed by: UROLOGY

## 2020-02-23 PROCEDURE — G0180 PR HOME HEALTH MD CERTIFICATION: ICD-10-PCS | Mod: ,,, | Performed by: UROLOGY

## 2020-02-23 NOTE — PLAN OF CARE
Maureen with [facility] contacted and stated home health plans to visit patient tomorrow.  Left message with patient's daughter, Rachel, at [phone].

## 2020-02-23 NOTE — NURSING
Patient alert and oriented X4, assessment and ordered interventions completed.  Discharge instructions given, pt, wife, son and daughter (via telephone) verbalize understanding.  [place] nurse provided education on infusion equipment and administration.  PICC line dressing was changed, new dressing in place.  Patient no longer complain of hematoma previously discussed.  No falls or injuries to report during shift.

## 2020-02-24 ENCOUNTER — TELEPHONE (OUTPATIENT)
Dept: UROLOGY | Facility: CLINIC | Age: 70
End: 2020-02-24

## 2020-02-24 ENCOUNTER — LAB VISIT (OUTPATIENT)
Dept: LAB | Facility: HOSPITAL | Age: 70
End: 2020-02-24
Attending: UROLOGY
Payer: MEDICARE

## 2020-02-24 DIAGNOSIS — A41.9 SEPTICEMIA DURING LABOR: Primary | ICD-10-CM

## 2020-02-24 LAB
ALBUMIN SERPL BCP-MCNC: 2.9 G/DL (ref 3.5–5.2)
ALP SERPL-CCNC: 79 U/L (ref 55–135)
ALT SERPL W/O P-5'-P-CCNC: 30 U/L (ref 10–44)
ANION GAP SERPL CALC-SCNC: 11 MMOL/L (ref 8–16)
AST SERPL-CCNC: 59 U/L (ref 10–40)
BASOPHILS # BLD AUTO: 0.03 K/UL (ref 0–0.2)
BASOPHILS NFR BLD: 0.4 % (ref 0–1.9)
BILIRUB SERPL-MCNC: 0.8 MG/DL (ref 0.1–1)
BUN SERPL-MCNC: 14 MG/DL (ref 8–23)
CALCIUM SERPL-MCNC: 9.3 MG/DL (ref 8.7–10.5)
CHLORIDE SERPL-SCNC: 103 MMOL/L (ref 95–110)
CO2 SERPL-SCNC: 24 MMOL/L (ref 23–29)
CREAT SERPL-MCNC: 1 MG/DL (ref 0.5–1.4)
DIFFERENTIAL METHOD: ABNORMAL
EOSINOPHIL # BLD AUTO: 0.1 K/UL (ref 0–0.5)
EOSINOPHIL NFR BLD: 1.3 % (ref 0–8)
ERYTHROCYTE [DISTWIDTH] IN BLOOD BY AUTOMATED COUNT: 12.7 % (ref 11.5–14.5)
EST. GFR  (AFRICAN AMERICAN): >60 ML/MIN/1.73 M^2
EST. GFR  (NON AFRICAN AMERICAN): >60 ML/MIN/1.73 M^2
GLUCOSE SERPL-MCNC: 105 MG/DL (ref 70–110)
HCT VFR BLD AUTO: 36.1 % (ref 40–54)
HGB BLD-MCNC: 11.6 G/DL (ref 14–18)
IMM GRANULOCYTES # BLD AUTO: 0.05 K/UL (ref 0–0.04)
IMM GRANULOCYTES NFR BLD AUTO: 0.7 % (ref 0–0.5)
LYMPHOCYTES # BLD AUTO: 1.3 K/UL (ref 1–4.8)
LYMPHOCYTES NFR BLD: 18.3 % (ref 18–48)
MCH RBC QN AUTO: 33.6 PG (ref 27–31)
MCHC RBC AUTO-ENTMCNC: 32.1 G/DL (ref 32–36)
MCV RBC AUTO: 105 FL (ref 82–98)
MONOCYTES # BLD AUTO: 0.6 K/UL (ref 0.3–1)
MONOCYTES NFR BLD: 8.2 % (ref 4–15)
NEUTROPHILS # BLD AUTO: 5.1 K/UL (ref 1.8–7.7)
NEUTROPHILS NFR BLD: 71.1 % (ref 38–73)
NRBC BLD-RTO: 0 /100 WBC
PLATELET # BLD AUTO: 212 K/UL (ref 150–350)
PMV BLD AUTO: 10.1 FL (ref 9.2–12.9)
POTASSIUM SERPL-SCNC: 3.2 MMOL/L (ref 3.5–5.1)
PROT SERPL-MCNC: 7.3 G/DL (ref 6–8.4)
RBC # BLD AUTO: 3.45 M/UL (ref 4.6–6.2)
SODIUM SERPL-SCNC: 138 MMOL/L (ref 136–145)
WBC # BLD AUTO: 7.16 K/UL (ref 3.9–12.7)

## 2020-02-24 PROCEDURE — 85025 COMPLETE CBC W/AUTO DIFF WBC: CPT

## 2020-02-24 PROCEDURE — 80053 COMPREHEN METABOLIC PANEL: CPT

## 2020-02-24 NOTE — TELEPHONE ENCOUNTER
----- Message from Rhnoda Nj MA sent at 2/24/2020  1:22 PM CST -----  Contact: Patient daughter cassidy  Pt daughter wanted to make sure that her dad could still have his pic line antibiotics for this procedure. He currently has a pic line that has to stay in place for 6 weeks. Can you please give her a call.  Thanks

## 2020-02-24 NOTE — PRE-PROCEDURE INSTRUCTIONS
Received message from Dr. Rose . She has not seen pt since 2018 and was not on Eliquis. Attempted to call Dr. Inman in [place] but unable to contact.

## 2020-02-24 NOTE — ANESTHESIA PAT ROS NOTE
02/24/2020  Antonio Gross is a 69 y.o., male.      Pre-op Assessment         Review of Systems  Anesthesia Hx:  No problems with previous Anesthesia  History of prior surgery of interest to airway management or planning: Previous anesthesia: General cysto with stent placed 2/15/20 with general anesthesia.    Social:  Social Alcohol Use, Non-Smoker    Hematology/Oncology:     Oncology Normal   Hematology Comments: Pt. On Eliquis for A-Fib   EENT/Dental:EENT/Dental Normal   Cardiovascular:   Exercise tolerance: poor Hypertension Dysrhythmias atrial fibrillation CHF Pt on eliquis  Lasix as needed   Pulmonary:  Pulmonary Normal    Renal/:   Rt. ureteral stone  Recent pyelonephritis-- on ancef IV at home thru [facility]   Neurological:  Neurology Normal    Endocrine:  Endocrine Normal    Dermatological:   Bruises easily due to anti-coagulant  Has PICC line   Psych:  Psychiatric Normal              Anesthesia Assessment: Preoperative EQUATION    Planned Procedure: Procedure(s) (LRB):  URETEROSCOPY (Right)  Requested Anesthesia Type:General  Surgeon: Alejandro Anguiano MD  Service: Urology  Known or anticipated Date of Surgery:3/4/2020    Surgeon notes: reviewed    Electronic QUestionnaire Assessment completed via nurse interview with patient.        Triage considerations:       Previous anesthesia records:GETA 2/15/20  Airway Placement Date: 02/15/20 Placement Time: 1658 , created via procedure documentation  Method of Intubation: Direct laryngoscopy Mask Ventilation: Not Attempted Intubated: Postinduction Blade: Patel #2 Airway Device Size: 8.0 Placement Verified By: Capnometry Complicating Factors: Anterior larynx Findings Post-Intubation: Bilateral breath sounds Secured at: Lips Complications: None Removal Date: 02/15/20 Removal Time: 1728   Airway Placement Date: 02/15/20 Placement Time: 1658 ,  created via procedure documentation  Method of Intubation: Direct laryngoscopy Airway Device Size: 8.0 Placement Verified By: Capnometry Removal Date: 02/15/20 Removal Time: 1728       Last PCP note: outside [facility] Dr. DOMINIQUE Topete PCP here// DR. JOANNA Brooks PCP in [facility]    Subspecialty notes: Cardiology: General and Heart Failure, Urology   Cardiologist in [facility]. Dr. Modesto Inman      Other important co-morbidities: A FIB, CHF and HTN      Tests already available:  Available tests,  within 1 month , within [facility] .   EKG 12-lead   Order: [id number]   Status:  Final result   Visible to patient:  Yes (Patient Portal and MyChart Bedside) Next appt:  03/12/2020 at 03:30 PM in Infectious Diseases (Mariaelena Gamino MD) Dx:  Fever      Narrative   Performed by: GEMUSE   Test Reason : R50.9,    Vent. Rate : 069 BPM     Atrial Rate : 069 BPM     P-R Int : 178 ms          QRS Dur : 158 ms      QT Int : 472 ms       P-R-T Axes : 026 -26 079 degrees     QTc Int : 505 ms    Normal sinus rhythm  Left bundle branch block  Abnormal ECG  No previous ECGs available  Confirmed by Eloina GLASS, Ines ([id number]) on 2/15/2020 12:44:28 PM    Referred By: AAAREFERR   SELF           Confirmed By:Ines Duvall MD      Specimen Collected: 02/15/20 09:16 Last Resulted: 02/15/20 19:46                       Instructions given. (See in Nurse's note)    Optimization:  Recent hospitalization, released 2/22/20-- had procedure 2/15  Pt. On IV antibiotics      Plan:    Testing:  none   Pre-anesthesia  visit       Visit focus: recent anesthesia 2/15/20     Consultation:messaged PCP for Eliquis instructions// pt cardiologist in [facility]      Navigation:  Consults scheduled.             Results will be tracked by Preop Clinic.

## 2020-02-24 NOTE — PRE-PROCEDURE INSTRUCTIONS
Spoke to pt recently discharged from hospital-- pyelonephritis. Pt on IV antibiotics // has a PICC line. Reviewed meds with pt.-- does not use portal, mailing instructions to pt.  Pt lives here part of year and [facility].. Other part. [place]

## 2020-02-26 ENCOUNTER — PATIENT MESSAGE (OUTPATIENT)
Dept: SURGERY | Facility: HOSPITAL | Age: 70
End: 2020-02-26

## 2020-02-26 ENCOUNTER — TELEPHONE (OUTPATIENT)
Dept: INFECTIOUS DISEASES | Facility: CLINIC | Age: 70
End: 2020-02-26

## 2020-02-26 NOTE — PRE-PROCEDURE INSTRUCTIONS
Discussed with Dr. Dempsey -- messaged cardiologist in [facility]  -- Dr. BUCK Inman for instructions on Eliquis.   Called wife to advise of request (she is out of [place]) Suggests calling Dr. Yves Brooks (PCP) in [place] -- states that he follows pt. Yohannes.  Spoke to Dr. Brooks -- [facility] [place] pt can hold eliquis day before surgery and resume as soon as possible post-op.

## 2020-02-26 NOTE — TELEPHONE ENCOUNTER
----- Message from Mariaelena Gamino MD sent at 2/26/2020  4:25 PM CST -----  Please call patient and let him know that his kidney and liver function are stable    ----- Message -----  From: Comfort Aragon MA  Sent: 2/26/2020   1:49 PM CST  To: Mariaelena Gamino MD

## 2020-02-27 ENCOUNTER — PATIENT MESSAGE (OUTPATIENT)
Dept: INFECTIOUS DISEASES | Facility: CLINIC | Age: 70
End: 2020-02-27

## 2020-02-27 NOTE — PLAN OF CARE
SW Supervisor received a voicemail from Pt's daughter, Rachel ([phone]). SW returned Pt's daughter's call. Rachel stated that the Case Management staff did not sufficiently plan for her father's discharge. She stated that she works in the field and manages staff and that all three individuals involved form Case Management were insufficient and not well educated. SW Supervisor had reviewed case and Pt's daughter for clarification regarding her concerns, as it appeared Pt was set up with Home Health and IV antibiotics. SW Supervisor asked if her father was getting the home health and IV antibiotics. Pt's daughter stated that he was, but that it was because of her. GLORIA listened to Pt's daughter and apologized that she had a negative experience with the Case Management staff and agreed to follow-up with the team members involved. Pt's daughter also requested the full names and titles of the Case Management staff involved, which GLORIA provided.     Sharla Bruno, [facility]   , Case Management

## 2020-02-29 ENCOUNTER — PATIENT MESSAGE (OUTPATIENT)
Dept: INFECTIOUS DISEASES | Facility: CLINIC | Age: 70
End: 2020-02-29

## 2020-03-02 NOTE — PHYSICIAN QUERY
PT Name: Antonio Gross  MR #: [id number]  Physician Query Form - Renal Condition Clarification     CDS/: Rosa Hughes RN               Contact information: [phone]    This form is a permanent document in the medical record.     QueryDate: March 2, 2020    By submitting this query, we are merely seeking further clarification of documentation. Please utilize your independent clinical judgment when addressing the question(s) below.    The Medical record contains the following:   Indicator Supporting Clinical Findings Location in Medical Record   x Kidney (Renal) Insufficiency Acute renal insufficiency - resolved   - baseline creatinine not documented   - s/p IVF   2/20 Hosp med PN   x Kidney (Renal) Failure / Injury Acute kidney injury 2/21 Hosp med PN    Nephrotoxic Agents     x BUN/Creatinine GFR Bun/cr= 18/1.4   GFR=50.2  Bun/cr=19/ 1.2   GFR=>60  Bun/cr= 29/ 1.1  GFR= >60 2/15 Lab  2/16 Lab  217 Lab    Urine: Casts         Eosinophils      Dehydration      Nausea/Vomiting      Dialysis/CRRT     x Treatment: BMP    NS @ 100cc/hr  NS @ 2586cc/hr 2/15 NSG orders    2/15 MAR    Other:      Acute Kidney Injury / Acute Renal Failure has different defining criteria. A generally accepted guideline  is:   A greater than 100% (2X) rise in serum creatinine from baseline* occurring during the course of a single hospital stay.   *Baseline as determined by the providers judgment and consideration of previous lab values and other documentation, if available.    A diagnosis of Acute Kidney Injury/ Acute Renal Failure should incorporate abnormal labs and clinical findings that are clinically significant      References: 1. Trish et al. Acute renal failure-definition, outcome measures, animal models, fluid therapy and information technology needs: the Second International Consensus Conference of the Acute Dialysis Quality Initiative (ADQI) Group. Crit Care 2004; 8:B204; 2. Leo et al. Acute Kidney Injury  Network: report of an initiative to improve outcomes in acute kidney injury. Crit Care 2007; 11:R31; 3. Kidney Disease: Improving Global Outcomes (KDIGO). Acute Kidney Injury Work Group. KDIGO clinical practice guidelines for acute kidney injury. Kidney Int Suppl 2012; 2:1.    The clinical guidelines noted below is only a system guideline, it does not replace the providers clinical judgment.    Provider, please specify the diagnosis or diagnoses associated with above clinical findings.    Please clarify conflicting diagnosis as it relates to renal diagnosis:      [   ] Other Acute Kidney Failure/Injury (please specify): ____________       [ X  ] Unspecified Acute Kidney Failure/Injury        [   ] Acute Renal Insufficiency  Consider if SCr rise is transient and normalizes quickly with no efforts at real resuscitation of vital signs and perfusion     [   ] Other (please specify): _________________________________     [   ]  Clinically Undetermined       Please document in your progress notes daily for the duration of treatment until resolved and include in your discharge summary.

## 2020-03-03 ENCOUNTER — PATIENT MESSAGE (OUTPATIENT)
Dept: INFECTIOUS DISEASES | Facility: CLINIC | Age: 70
End: 2020-03-03

## 2020-03-03 ENCOUNTER — PATIENT MESSAGE (OUTPATIENT)
Dept: UROLOGY | Facility: CLINIC | Age: 70
End: 2020-03-03

## 2020-03-03 ENCOUNTER — TELEPHONE (OUTPATIENT)
Dept: UROLOGY | Facility: CLINIC | Age: 70
End: 2020-03-03

## 2020-03-03 ENCOUNTER — LAB VISIT (OUTPATIENT)
Dept: LAB | Facility: HOSPITAL | Age: 70
End: 2020-03-03
Attending: UROLOGY
Payer: MEDICARE

## 2020-03-03 DIAGNOSIS — L73.9 STAPHYLOCOCCUS AUREUS SUPERFICIAL FOLLICULITIS: Primary | ICD-10-CM

## 2020-03-03 DIAGNOSIS — B95.61 STAPHYLOCOCCUS AUREUS SUPERFICIAL FOLLICULITIS: Primary | ICD-10-CM

## 2020-03-03 PROCEDURE — 80053 COMPREHEN METABOLIC PANEL: CPT

## 2020-03-03 PROCEDURE — 85025 COMPLETE CBC W/AUTO DIFF WBC: CPT

## 2020-03-03 NOTE — TELEPHONE ENCOUNTER
Called pt to confirm arrival time of 215pm for procedure on 3-4-20. Gave pt NPO instructions and gave pt opportunity to ask questions. Pt verbalized understanding.

## 2020-03-04 ENCOUNTER — PATIENT MESSAGE (OUTPATIENT)
Dept: INFECTIOUS DISEASES | Facility: CLINIC | Age: 70
End: 2020-03-04

## 2020-03-04 ENCOUNTER — ANESTHESIA (OUTPATIENT)
Dept: SURGERY | Facility: HOSPITAL | Age: 70
End: 2020-03-04
Payer: MEDICARE

## 2020-03-04 ENCOUNTER — ANESTHESIA EVENT (OUTPATIENT)
Dept: SURGERY | Facility: HOSPITAL | Age: 70
End: 2020-03-04
Payer: MEDICARE

## 2020-03-04 ENCOUNTER — HOSPITAL ENCOUNTER (OUTPATIENT)
Facility: HOSPITAL | Age: 70
Discharge: HOME | End: 2020-03-04
Attending: UROLOGY | Admitting: UROLOGY
Payer: MEDICARE

## 2020-03-04 VITALS
BODY MASS INDEX: 24.39 KG/M2 | OXYGEN SATURATION: 97 % | HEIGHT: 74 IN | TEMPERATURE: 99 F | SYSTOLIC BLOOD PRESSURE: 155 MMHG | DIASTOLIC BLOOD PRESSURE: 68 MMHG | WEIGHT: 190.06 LBS | RESPIRATION RATE: 19 BRPM | HEART RATE: 53 BPM

## 2020-03-04 DIAGNOSIS — N20.1 URETERAL STONE: Primary | ICD-10-CM

## 2020-03-04 DIAGNOSIS — N20.0 KIDNEY STONE: ICD-10-CM

## 2020-03-04 PROBLEM — R78.81 BACTEREMIA DUE TO STAPHYLOCOCCUS: Status: RESOLVED | Noted: 2020-02-17 | Resolved: 2020-03-04

## 2020-03-04 PROBLEM — B95.8 BACTEREMIA DUE TO STAPHYLOCOCCUS: Status: RESOLVED | Noted: 2020-02-17 | Resolved: 2020-03-04

## 2020-03-04 PROBLEM — N12 PYELONEPHRITIS: Status: RESOLVED | Noted: 2020-02-15 | Resolved: 2020-03-04

## 2020-03-04 LAB
ALBUMIN SERPL BCP-MCNC: 3 G/DL (ref 3.5–5.2)
ALP SERPL-CCNC: 80 U/L (ref 55–135)
ALT SERPL W/O P-5'-P-CCNC: 23 U/L (ref 10–44)
ANION GAP SERPL CALC-SCNC: 10 MMOL/L (ref 8–16)
AST SERPL-CCNC: 67 U/L (ref 10–40)
BASOPHILS # BLD AUTO: 0.01 K/UL (ref 0–0.2)
BASOPHILS NFR BLD: 0.2 % (ref 0–1.9)
BILIRUB SERPL-MCNC: 0.8 MG/DL (ref 0.1–1)
BUN SERPL-MCNC: 11 MG/DL (ref 8–23)
CALCIUM SERPL-MCNC: 9.3 MG/DL (ref 8.7–10.5)
CHLORIDE SERPL-SCNC: 102 MMOL/L (ref 95–110)
CO2 SERPL-SCNC: 25 MMOL/L (ref 23–29)
CREAT SERPL-MCNC: 0.8 MG/DL (ref 0.5–1.4)
DIFFERENTIAL METHOD: ABNORMAL
EOSINOPHIL # BLD AUTO: 0.1 K/UL (ref 0–0.5)
EOSINOPHIL NFR BLD: 2 % (ref 0–8)
ERYTHROCYTE [DISTWIDTH] IN BLOOD BY AUTOMATED COUNT: 13.2 % (ref 11.5–14.5)
EST. GFR  (AFRICAN AMERICAN): >60 ML/MIN/1.73 M^2
EST. GFR  (NON AFRICAN AMERICAN): >60 ML/MIN/1.73 M^2
GLUCOSE SERPL-MCNC: 73 MG/DL (ref 70–110)
HCT VFR BLD AUTO: 32.5 % (ref 40–54)
HGB BLD-MCNC: 10.4 G/DL (ref 14–18)
IMM GRANULOCYTES # BLD AUTO: 0.01 K/UL (ref 0–0.04)
IMM GRANULOCYTES NFR BLD AUTO: 0.2 % (ref 0–0.5)
LYMPHOCYTES # BLD AUTO: 1.3 K/UL (ref 1–4.8)
LYMPHOCYTES NFR BLD: 28.3 % (ref 18–48)
MCH RBC QN AUTO: 33.8 PG (ref 27–31)
MCHC RBC AUTO-ENTMCNC: 32 G/DL (ref 32–36)
MCV RBC AUTO: 106 FL (ref 82–98)
MONOCYTES # BLD AUTO: 0.5 K/UL (ref 0.3–1)
MONOCYTES NFR BLD: 10.5 % (ref 4–15)
NEUTROPHILS # BLD AUTO: 2.6 K/UL (ref 1.8–7.7)
NEUTROPHILS NFR BLD: 58.8 % (ref 38–73)
NRBC BLD-RTO: 0 /100 WBC
PLATELET # BLD AUTO: 148 K/UL (ref 150–350)
PMV BLD AUTO: 10.7 FL (ref 9.2–12.9)
POTASSIUM SERPL-SCNC: 3.3 MMOL/L (ref 3.5–5.1)
PROT SERPL-MCNC: 7.2 G/DL (ref 6–8.4)
RBC # BLD AUTO: 3.08 M/UL (ref 4.6–6.2)
SODIUM SERPL-SCNC: 137 MMOL/L (ref 136–145)
WBC # BLD AUTO: 4.49 K/UL (ref 3.9–12.7)

## 2020-03-04 PROCEDURE — 63600175 PHARM REV CODE 636 W HCPCS: Performed by: NURSE ANESTHETIST, CERTIFIED REGISTERED

## 2020-03-04 PROCEDURE — 63600175 PHARM REV CODE 636 W HCPCS: Performed by: STUDENT IN AN ORGANIZED HEALTH CARE EDUCATION/TRAINING PROGRAM

## 2020-03-04 PROCEDURE — D9220A PRA ANESTHESIA: ICD-10-PCS | Mod: ANES,,, | Performed by: ANESTHESIOLOGY

## 2020-03-04 PROCEDURE — C1769 GUIDE WIRE: HCPCS | Performed by: UROLOGY

## 2020-03-04 PROCEDURE — 71000015 HC POSTOP RECOV 1ST HR: Performed by: UROLOGY

## 2020-03-04 PROCEDURE — 25000003 PHARM REV CODE 250: Performed by: NURSE ANESTHETIST, CERTIFIED REGISTERED

## 2020-03-04 PROCEDURE — D9220A PRA ANESTHESIA: ICD-10-PCS | Mod: CRNA,,, | Performed by: NURSE ANESTHETIST, CERTIFIED REGISTERED

## 2020-03-04 PROCEDURE — D9220A PRA ANESTHESIA: Mod: ANES,,, | Performed by: ANESTHESIOLOGY

## 2020-03-04 PROCEDURE — 82365 CALCULUS SPECTROSCOPY: CPT

## 2020-03-04 PROCEDURE — 36000707: Performed by: UROLOGY

## 2020-03-04 PROCEDURE — 52352 PR CYSTO/URETERO/PYELOSCOPY, CALCULUS TX: ICD-10-PCS | Mod: RT,,, | Performed by: UROLOGY

## 2020-03-04 PROCEDURE — 27201423 OPTIME MED/SURG SUP & DEVICES STERILE SUPPLY: Performed by: UROLOGY

## 2020-03-04 PROCEDURE — D9220A PRA ANESTHESIA: Mod: CRNA,,, | Performed by: NURSE ANESTHETIST, CERTIFIED REGISTERED

## 2020-03-04 PROCEDURE — 36000706: Performed by: UROLOGY

## 2020-03-04 PROCEDURE — 71000044 HC DOSC ROUTINE RECOVERY FIRST HOUR: Performed by: UROLOGY

## 2020-03-04 PROCEDURE — 37000008 HC ANESTHESIA 1ST 15 MINUTES: Performed by: UROLOGY

## 2020-03-04 PROCEDURE — 37000009 HC ANESTHESIA EA ADD 15 MINS: Performed by: UROLOGY

## 2020-03-04 PROCEDURE — S0028 INJECTION, FAMOTIDINE, 20 MG: HCPCS | Performed by: NURSE ANESTHETIST, CERTIFIED REGISTERED

## 2020-03-04 PROCEDURE — 52352 CYSTOURETERO W/STONE REMOVE: CPT | Mod: RT,,, | Performed by: UROLOGY

## 2020-03-04 RX ORDER — LIDOCAINE HCL/PF 100 MG/5ML
SYRINGE (ML) INTRAVENOUS
Status: DISCONTINUED | OUTPATIENT
Start: 2020-03-04 | End: 2020-03-04

## 2020-03-04 RX ORDER — ONDANSETRON 2 MG/ML
INJECTION INTRAMUSCULAR; INTRAVENOUS
Status: DISCONTINUED | OUTPATIENT
Start: 2020-03-04 | End: 2020-03-04

## 2020-03-04 RX ORDER — FENTANYL CITRATE 50 UG/ML
INJECTION, SOLUTION INTRAMUSCULAR; INTRAVENOUS
Status: DISCONTINUED | OUTPATIENT
Start: 2020-03-04 | End: 2020-03-04

## 2020-03-04 RX ORDER — GLYCOPYRROLATE 0.2 MG/ML
INJECTION INTRAMUSCULAR; INTRAVENOUS
Status: DISCONTINUED | OUTPATIENT
Start: 2020-03-04 | End: 2020-03-04

## 2020-03-04 RX ORDER — SODIUM CHLORIDE 9 MG/ML
INJECTION, SOLUTION INTRAVENOUS CONTINUOUS
Status: DISCONTINUED | OUTPATIENT
Start: 2020-03-04 | End: 2020-03-04 | Stop reason: HOSPADM

## 2020-03-04 RX ORDER — HYDROCODONE BITARTRATE AND ACETAMINOPHEN 5; 325 MG/1; MG/1
1 TABLET ORAL EVERY 4 HOURS PRN
Status: DISCONTINUED | OUTPATIENT
Start: 2020-03-04 | End: 2020-03-04 | Stop reason: HOSPADM

## 2020-03-04 RX ORDER — ONDANSETRON 8 MG/1
8 TABLET, ORALLY DISINTEGRATING ORAL EVERY 8 HOURS PRN
Status: DISCONTINUED | OUTPATIENT
Start: 2020-03-04 | End: 2020-03-04 | Stop reason: HOSPADM

## 2020-03-04 RX ORDER — PROPOFOL 10 MG/ML
VIAL (ML) INTRAVENOUS
Status: DISCONTINUED | OUTPATIENT
Start: 2020-03-04 | End: 2020-03-04

## 2020-03-04 RX ORDER — FAMOTIDINE 10 MG/ML
INJECTION INTRAVENOUS
Status: DISCONTINUED | OUTPATIENT
Start: 2020-03-04 | End: 2020-03-04

## 2020-03-04 RX ORDER — HYDROMORPHONE HYDROCHLORIDE 1 MG/ML
0.2 INJECTION, SOLUTION INTRAMUSCULAR; INTRAVENOUS; SUBCUTANEOUS EVERY 5 MIN PRN
Status: DISCONTINUED | OUTPATIENT
Start: 2020-03-04 | End: 2020-03-04 | Stop reason: HOSPADM

## 2020-03-04 RX ORDER — SODIUM CHLORIDE 0.9 % (FLUSH) 0.9 %
10 SYRINGE (ML) INJECTION
Status: DISCONTINUED | OUTPATIENT
Start: 2020-03-04 | End: 2020-03-04 | Stop reason: HOSPADM

## 2020-03-04 RX ORDER — MIDAZOLAM HYDROCHLORIDE 1 MG/ML
INJECTION, SOLUTION INTRAMUSCULAR; INTRAVENOUS
Status: DISCONTINUED | OUTPATIENT
Start: 2020-03-04 | End: 2020-03-04

## 2020-03-04 RX ORDER — ROCURONIUM BROMIDE 10 MG/ML
INJECTION, SOLUTION INTRAVENOUS
Status: DISCONTINUED | OUTPATIENT
Start: 2020-03-04 | End: 2020-03-04

## 2020-03-04 RX ORDER — CEFAZOLIN SODIUM 1 G/3ML
2 INJECTION, POWDER, FOR SOLUTION INTRAMUSCULAR; INTRAVENOUS
Status: DISCONTINUED | OUTPATIENT
Start: 2020-03-04 | End: 2020-03-04 | Stop reason: HOSPADM

## 2020-03-04 RX ORDER — LIDOCAINE HYDROCHLORIDE 10 MG/ML
1 INJECTION, SOLUTION EPIDURAL; INFILTRATION; INTRACAUDAL; PERINEURAL ONCE
Status: DISCONTINUED | OUTPATIENT
Start: 2020-03-04 | End: 2020-03-04 | Stop reason: HOSPADM

## 2020-03-04 RX ORDER — TAMSULOSIN HYDROCHLORIDE 0.4 MG/1
0.4 CAPSULE ORAL DAILY PRN
Qty: 15 CAPSULE | Refills: 0 | Status: SHIPPED | OUTPATIENT
Start: 2020-03-04

## 2020-03-04 RX ADMIN — GLYCOPYRROLATE 0.2 MG: 0.2 INJECTION, SOLUTION INTRAMUSCULAR; INTRAVENOUS at 01:03

## 2020-03-04 RX ADMIN — FENTANYL CITRATE 50 MCG: 50 INJECTION, SOLUTION INTRAMUSCULAR; INTRAVENOUS at 02:03

## 2020-03-04 RX ADMIN — MIDAZOLAM HYDROCHLORIDE 2 MG: 1 INJECTION, SOLUTION INTRAMUSCULAR; INTRAVENOUS at 01:03

## 2020-03-04 RX ADMIN — FAMOTIDINE 20 MG: 10 INJECTION, SOLUTION INTRAVENOUS at 01:03

## 2020-03-04 RX ADMIN — FENTANYL CITRATE 50 MCG: 50 INJECTION, SOLUTION INTRAMUSCULAR; INTRAVENOUS at 01:03

## 2020-03-04 RX ADMIN — ROCURONIUM BROMIDE 40 MG: 10 INJECTION, SOLUTION INTRAVENOUS at 01:03

## 2020-03-04 RX ADMIN — LIDOCAINE HYDROCHLORIDE 80 MG: 20 INJECTION, SOLUTION INTRAVENOUS at 01:03

## 2020-03-04 RX ADMIN — SODIUM CHLORIDE: 0.9 INJECTION, SOLUTION INTRAVENOUS at 01:03

## 2020-03-04 RX ADMIN — PROPOFOL 180 MG: 10 INJECTION, EMULSION INTRAVENOUS at 01:03

## 2020-03-04 RX ADMIN — ONDANSETRON 4 MG: 2 INJECTION INTRAMUSCULAR; INTRAVENOUS at 01:03

## 2020-03-04 NOTE — DISCHARGE SUMMARY
[facility]  Discharge Note  Short Stay    Admit Date: 3/4/2020    Discharge Date and Time: 03/04/2020 2:03 PM      Attending Physician: Alejandro Anguiano MD     Discharge Provider: Marlena Comer    Diagnoses:  Active Hospital Problems    Diagnosis  POA    *Ureteral stone [N20.1]  Yes    Atrial fibrillation [I48.91]  Yes    Current use of long term anticoagulation [Z79.01]  Not Applicable    Essential hypertension [I10]  Yes      Resolved Hospital Problems   No resolved problems to display.       Discharged Condition: good    Hospital Course: Patient was admitted for right URS and tolerated the procedure well with no complications. The patient was discharged home in good condition on the same day.       Final Diagnoses: Same as principal problem.    Disposition: Home or Self Care    Follow up/Patient Instructions:    Medications:  Reconciled Home Medications:   Current Discharge Medication List      START taking these medications    Details   tamsulosin (FLOMAX) 0.4 mg Cap Take 1 capsule (0.4 mg total) by mouth daily as needed (flank pain).  Qty: 15 capsule, Refills: 0         CONTINUE these medications which have NOT CHANGED    Details   amiodarone HCl (AMIODARONE ORAL) Take 100 mg by mouth once daily.       apixaban (ELIQUIS ORAL) Take 5 mg by mouth 2 (two) times daily.       candesartan (ATACAND) 8 MG tablet Take 8 mg by mouth once daily.      dextrose 5 % SolP 500 mL with ceFAZolin 1 gram SolR 6 g Inject 6 g into the vein continuous.      Lactobacillus acidophilus Cap Take 2 capsules by mouth 2 (two) times daily.  Qty: 100 each, Refills: prn      metoprolol succinate (TOPROL-XL) 50 MG 24 hr tablet Take 50 mg by mouth once daily.      furosemide (LASIX) 80 MG tablet Take 80 mg by mouth daily as needed (pt takes 1/2 - 1 tab as needed for fluid).           Discharge Procedure Orders   Diet general     Call MD for:  temperature >100.4     Call MD for:  persistent nausea and vomiting     Call MD  for:  severe uncontrolled pain     Call MD for:  redness, tenderness, or signs of infection (pain, swelling, redness, odor or green/yellow discharge around incision site)     No dressing needed     Activity as tolerated     Follow-up Information     Alejandro Anguiano MD In 3 months.    Specialty:  Urology  Why:  post op urs  Contact information:  [address]  [facility] [address]  [phone]                     Marlena Comer MD  Urology, PGY- 5  Pager# [phone]

## 2020-03-04 NOTE — ANESTHESIA PROCEDURE NOTES
Intubation  Performed by: Ewa Friedman CRNA  Authorized by: Sharif Suarez MD     Intubation:     Induction:  Intravenous    Intubated:  Postinduction    Mask Ventilation:  Easy mask    Attempts:  1    Attempted By:  Student    Method of Intubation:  Direct    Blade:  Patel 2    Laryngeal View Grade: Grade I - full view of chords      Difficult Airway Encountered?: No      Complications:  None    Airway Device:  Oral endotracheal tube    Airway Device Size:  7.5    Style/Cuff Inflation:  Cuffed (inflated to minimal occlusive pressure)    Tube secured:  24    Secured at:  The lips    Placement Verified By:  Capnometry    Complicating Factors:  None    Findings Post-Intubation:  BS equal bilateral and atraumatic/condition of teeth unchanged

## 2020-03-04 NOTE — OP NOTE
Ureteroscopic Stone Extraction Operative Note    Preoperative Diagnosis:   Urolithiasis    Postoperative Diagnosis:  Urolithiasis    Procedure:  1. Ureteroscopic stone extraction without lithotripsy with basket extraction of stone (right) (55687)    Attending Surgeon: Alejandro Anguiano MD    Anesthesia: General Endotracheal    EBL: <10 mL    Complications: None    Findings: No remaining stone fragments were seen    Drains: None    Reason for procedure: Antonio Gross is a very pleasant 69 y.o. male who presented with a history of urolithiasis. He had placement of a ureteral stent for an obstructing stone with infection 2 weeks ago.     Procedure in detail:  Informed consent was obtained by explaining all risks and benefits of the procedure to the patient in detail.  After informed consent, the patient was brought to the OR where General Endotracheal anesthesia  was administered by the anesthesia staff. Appropriate perioperative antibiotics were given within 30 minutes of beginning the procedure. A formal timeout was performed prior to the procedure. After induction, the patient was gently placed in lithotomy position with all pressure points padded. Bilateral sequential compression devices were applied and activated prior to induction. The patient was prepped and draped in standard fashion.    A 20 Uzbek rigid cystoscope was passed per urethra into the bladder. Inspection of the bladder demonstrated no primary bladder pathology and the suspected urolithiasis was not seen to have passed into the bladder. The right ureteral stent was grasped and brought to the external meatus and through this a a flexible-tipped Glidewire was advanced into the ureter under fluoroscopic vision.     A dual-lumen ureteral catheter was used to place a second working wire. With a working wire and safety wire in place, the semi-rigid ureterscope was advanced to the level of the stone.     Under direct vision, the stone was  fragmented with the holmium laser into fragments appropriate for extraction.  Significant stone fragments remained at the end of the procedure and could not be entirely cleared. No ureteral stent was left in place.     He tolerated the procedure well and was extubated and transferred in stable condition to the recovery room.     We will plan to see him back in 2-3 months for continued evaluation including metabolic evaluation if appropriate.

## 2020-03-04 NOTE — TRANSFER OF CARE
"Anesthesia Transfer of Care Note    Patient: Antonio Gross    Procedure(s) Performed: Procedure(s) (LRB):  URETEROSCOPY (Right)  CYSTOSCOPY, WITH URETERAL STENT REMOVAL  CYSTOURETEROSCOPY, WITH HOLMIUM LASER LITHOTRIPSY OF URETERAL CALCULUS AND STENT INSERTION (Right)    Patient location: PACU    Anesthesia Type: general    Transport from OR: Transported from OR on 6-10 L/min O2 by face mask with adequate spontaneous ventilation    Post pain: adequate analgesia    Post assessment: no apparent anesthetic complications and tolerated procedure well    Post vital signs: stable    Level of consciousness: awake and alert    Nausea/Vomiting: no nausea/vomiting    Complications: none    Transfer of care protocol was followed      Last vitals:   Visit Vitals  BP (!) 183/89 (BP Location: Left arm, Patient Position: Lying)   Pulse (!) 59   Temp 37.1 °C (98.7 °F) (Temporal)   Resp 19   Ht 6' 2" (1.88 m)   Wt 86.2 kg (190 lb 0.6 oz)   SpO2 100%   BMI 24.40 kg/m²     "

## 2020-03-04 NOTE — INTERVAL H&P NOTE
The patient has been examined and the H&P has been reviewed:    I concur with the findings and changes have been noted since the H&P was written: We placed a ureteral stent approximately 2 weeks ago and patient has been treated with antibiotics.     Anesthesia/Surgery risks, benefits and alternative options discussed and understood by patient/family.          Active Hospital Problems    Diagnosis  POA    Ureteral stone [N20.1]  Yes      Resolved Hospital Problems   No resolved problems to display.

## 2020-03-04 NOTE — PROGRESS NOTES
Patient assigned to this writer by charge nurse. The patient is in the waiting room but, will be escorted to [facility] room 5. This writer is completing a chart review and reviewing MD orders.

## 2020-03-04 NOTE — ANESTHESIA PREPROCEDURE EVALUATION
03/04/2020  Antonio Gross is a 69 y.o., male.    Anesthesia Evaluation    I have reviewed the Patient Summary Reports.     I have reviewed the Medications.     Review of Systems  Anesthesia Hx:  Denies Hx of Anesthetic complications  History of prior surgery of interest to airway management or planning: Denies Family Hx of Anesthesia complications.   Denies Personal Hx of Anesthesia complications.   Social:  Non-Smoker, Social Alcohol Use    Hematology/Oncology:  Hematology Normal   Oncology Normal     EENT/Dental:EENT/Dental Normal   Cardiovascular:   Hypertension Dysrhythmias atrial fibrillation CHF Conclusion     · Mildly decreased left ventricular systolic function. The estimated ejection fraction is 50%.  · Septal wall has abnormal motion.  · Local segmental wall motion abnormalities.  · Grade I (mild) left ventricular diastolic dysfunction consistent with impaired relaxation.  · Severe left atrial enlargement.  · Mild right ventricular enlargement.  · Normal right ventricular systolic function.  · Mild right atrial enlargement.  · Mild mitral regurgitation.  · Mild aortic regurgitation.  · Normal central venous pressure (3 mmHg).  · The estimated PA systolic pressure is 29 mmHg.  · The ascending aorta is mildly dilated.     Pulmonary:  Pulmonary Normal    Renal/:  Renal/ Normal     Musculoskeletal:  Musculoskeletal Normal    Neurological:  Neurology Normal    Endocrine:  Endocrine Normal    Dermatological:  Skin Normal    Psych:  Psychiatric Normal           Physical Exam  General:  Well nourished    Airway/Jaw/Neck:  Airway Findings: Mouth Opening: Normal Tongue: Normal  General Airway Assessment: Adult  Mallampati: II  Improves to II with phonation.  TM Distance: Normal, at least 6 cm      Dental:  Dental Findings:   Chest/Lungs:  Chest/Lungs Findings: Clear to auscultation, Normal  Respiratory Rate     Heart/Vascular:  Heart Findings: Rhythm: Irregularly Irregular        Mental Status:  Mental Status Findings:  Cooperative, Alert and Oriented         Anesthesia Plan  Type of Anesthesia, risks & benefits discussed:  Anesthesia Type:  general  Patient's Preference:   Intra-op Monitoring Plan: standard ASA monitors  Intra-op Monitoring Plan Comments:   Post Op Pain Control Plan: multimodal analgesia  Post Op Pain Control Plan Comments:   Induction:   IV  Beta Blocker:  Patient is not currently on a Beta-Blocker (No further documentation required).       Informed Consent: Patient understands risks and agrees with Anesthesia plan.  Questions answered. Anesthesia consent signed with patient.  ASA Score: 2     Day of Surgery Review of History & Physical:    H&P update referred to the surgeon.         Ready For Surgery From Anesthesia Perspective.

## 2020-03-04 NOTE — H&P
[facility]    CC: right ureteral stone    HPI:  Antonio Gross is a 69 y.o. male with a right ureteral stone. He underwent stent placement on 2/15 secondary to a UTI. Cultures grew MRSA. He has been on IV abx since that time.    He is without complaints today and has had no recent fevers.       ROS:  Neg except per HPI    Past Medical History:   Diagnosis Date    A-fib     Anticoagulant long-term use     CHF (congestive heart failure)     previous hx heart failure    Hypertension        Past Surgical History:   Procedure Laterality Date    CYSTOSCOPY W/ URETERAL STENT PLACEMENT Right 2/15/2020    Procedure: CYSTOSCOPY, WITH URETERAL STENT INSERTION;  Surgeon: Alejandro Anguiano MD;  Location: [facility] OR [address] [place] [address];  Service: Urology;  Laterality: Right;    PROSTATECTOMY         Social History     Socioeconomic History    Marital status:      Spouse name: Not on file    Number of children: Not on file    Years of education: Not on file    Highest education level: Not on file   Occupational History    Not on file   Social Needs    Financial resource strain: Not on file    Food insecurity:     Worry: Not on file     Inability: Not on file    Transportation needs:     Medical: Not on file     Non-medical: Not on file   Tobacco Use    Smoking status: Never Smoker    Smokeless tobacco: Never Used   Substance and Sexual Activity    Alcohol use: Yes    Drug use: No    Sexual activity: Not on file   Lifestyle    Physical activity:     Days per week: Not on file     Minutes per session: Not on file    Stress: Not on file   Relationships    Social connections:     Talks on phone: Not on file     Gets together: Not on file     Attends Orthodoxy service: Not on file     Active member of club or organization: Not on file     Attends meetings of clubs or organizations: Not on file     Relationship status: Not on file   Other Topics Concern    Not on file   Social History Narrative     "Not on file       Family History   Problem Relation Age of Onset    No Known Problems Mother     No Known Problems Father        Review of patient's allergies indicates:  No Known Allergies    No current facility-administered medications on file prior to encounter.      Current Outpatient Medications on File Prior to Encounter   Medication Sig Dispense Refill    amiodarone HCl (AMIODARONE ORAL) Take 100 mg by mouth once daily.       apixaban (ELIQUIS ORAL) Take 5 mg by mouth 2 (two) times daily.       candesartan (ATACAND) 8 MG tablet Take 8 mg by mouth once daily.      dextrose 5 % SolP 500 mL with ceFAZolin 1 gram SolR 6 g Inject 6 g into the vein continuous.      furosemide (LASIX) 80 MG tablet Take 80 mg by mouth daily as needed (pt takes 1/2 - 1 tab as needed for fluid).      Lactobacillus acidophilus Cap Take 2 capsules by mouth 2 (two) times daily. 100 each prn    metoprolol succinate (TOPROL-XL) 50 MG 24 hr tablet Take 50 mg by mouth once daily.         Anticoagulation:  Yes - eliquis    Physical Exam:  Estimated body mass index is 24.4 kg/m² as calculated from the following:    Height as of this encounter: 6' 2" (1.88 m).    Weight as of this encounter: 86.2 kg (190 lb 0.6 oz).    General: No acute distress, well developed. AAOx3  Head: Normocephalic, Atraumatic  Lungs: normal respiratory effort, no respiratory distress, no wheezes  CV: regular rate, 2+ pulses  Abdomen: soft, non-tender, non-distended  MSK: no edema, no deformities, normal ROM  Skin: skin color, texture, turgor normal.  Neurologic: no focal deficits, sensation intact    Labs:    Urine dipstick today - negative for blood, leuks, nitrite     Lab Results   Component Value Date    WBC 4.49 03/03/2020    HGB 10.4 (L) 03/03/2020    HCT 32.5 (L) 03/03/2020     (H) 03/03/2020     (L) 03/03/2020           BMP  Lab Results   Component Value Date     03/03/2020    K 3.3 (L) 03/03/2020     03/03/2020    CO2 25 " 03/03/2020    BUN 11 03/03/2020    CREATININE 0.8 03/03/2020    CALCIUM 9.3 03/03/2020    ANIONGAP 10 03/03/2020    ESTGFRAFRICA >60.0 03/03/2020    EGFRNONAA >60.0 03/03/2020         Assessment: Antonio Gross is a 69 y.o. male with a right proximal 5 mm ureteral stone.     Plan:     1. To OR today for right URS  2. Consents signed   3. I have explained the risk, benefits, and alternatives of the procedure in detail. The patient voices understanding and all questions have been answered. The patient agrees to proceed as planned.       Marlena Comer MD

## 2020-03-04 NOTE — OP NOTE
[facility]  Operative Note    Date: 03/04/2020    Pre-Op Diagnosis:   1. Right ureteral stone    Patient Active Problem List   Diagnosis    Right ureteral stone    Atrial fibrillation    Current use of long term anticoagulation    Essential hypertension    Ureteral stone       Post-Op Diagnosis: same    Procedure(s) Performed:   1.  Right ureteroscopy  2.  Cystoscopy  3.  Laser lithotripsy  4.  Stone basket extraction   5.  Right ureteral stent removal  6.  Fluoro < 1 h    Specimen(s): stone for analysis    Staff Surgeon: Alejandro Anguiano MD    Assistant Surgeon: Marlena Comer MD    Anesthesia: General endotracheal anesthesia    Indications: Antonio Gross is a 69 y.o. male with a right ureteral stone, presenting for definitive stone management.  He currently does have a JJ ureteral stent in place. Originally presented with pyelonephritis and has been treated with >2 weeks of IV abx.     Findings:   - no bladder abnormalities  - stone radiopaque  - stone fragmented and removed with basket  - no significant ureteral trauma noted    Estimated Blood Loss: min    Drains: none    Procedure in detail:  After informed consent was obtained, the patient was brought the the cystoscopy suite and placed in the supine position.  SCDs were applied and working.  Anesthesia was administered.  The patient was then placed in the dorsal lithotomy position and prepped and draped in the usual sterile fashion.      A rigid cystoscope in a 22 Fr sheath was introduced into the patient's urethra.  This passed easily.  The entire urethra was visualized which showed no strictures or masses.  Formal cystoscopy was performed which revealed no masses or lesions suspicious for malignancy, no bladder stones, no bladder diverticuli, no trabeculations.  The ureteral orifices were visualized in the normal anatomic position bilaterally, there was a right stent in place.      A motion wire was passed up the right  ureteral orifice and up into the kidney alongside the stent.  This passed easily and placement was confirmed using fluoro.  The cystoscope was removed keeping the wire in place. The cystoscope was reinserted and the stent was removed.      An 8 Fr rigid ureteroscope was passed into the patient's bladder alongside the wire under direct vision.  It was then passed through the right ureteral orifice alongside the wire.  A stone was encountered at the level of the proximal ureter.  A 200 micron laser fiber was passed through the ureteroscope.  The stone was fragmented using the laser.  The laser fiber was removed and a Nitinol tipless basket was introduced through the ureteroscope.  Stone fragments were removed and placed in the bladder.  The ureteroscope was removed keeping the motion wire in place.  A cystoscope was reinserted and the bladder was irrigated to remove the stone fragments.  The bladder was drained and the wire was removed.       The patient tolerated the procedure well and was transferred to the recovery room in stable condition.      Disposition:  The patient will follow up with Dr. Anguiano in 3 months with a KUB and renal US.      Marlena Comer MD

## 2020-03-04 NOTE — DISCHARGE INSTRUCTIONS
Cystoscopy    Cystoscopy is a procedure that lets your doctor look directly inside your urethra and bladder. It can be used to:  · Help diagnose a problem with your urethra, bladder, or kidneys.  · Take a sample (biopsy) of bladder or urethral tissue.  · Treat certain problems (such as removing kidney stones).  · Place a stent to bypass an obstruction.  · Take special X-rays of the kidneys.  Based on the findings, your doctor may recommend other tests or treatments.  What is a cystoscope?  A cystoscope is a telescope-like instrument that contains lenses and fiberoptics (small glass wires that make bright light). The cystoscope may be straight and rigid, or flexible to bend around curves in the urethra. The doctor may look directly into the cystoscope, or project the image onto a monitor.  Getting ready  · Ask your doctor if you should stop taking any medicines before the procedure.  · Ask whether you should avoid eating or drinking anything after midnight before the procedure.  · Follow any other instructions your doctor gives you.  Tell your doctor before the exam if you:  · Take any medicines, such as aspirin or blood thinners  · Have allergies to any medicines  · Are pregnant   The procedure  Cystoscopy is done in the doctors office, surgery center, or hospital. The doctor and a nurse are present during the procedure. It takes only a few minutes, longer if a biopsy, X-ray, or treatment needs to be done.  During the procedure:  · You lie on an exam table on your back, knees bent and legs apart. You are covered with a drape.  · Your urethra and the area around it are washed. Anesthetic jelly may be applied to numb the urethra. Other pain medicine is usually not needed. In some cases, you may be offered a mild sedative to help you relax. If a more extensive procedure is to be done, such as a biopsy or kidney stone removal, general anesthesia may be needed.  · The cystoscope is inserted. A sterile fluid is put  into the bladder to expand it. You may feel pressure from this fluid.  · When the procedure is done, the cystoscope is removed.  After the procedure  If you had a sedative, general anesthesia, or spinal anesthesia, you must have someone drive you home. Once youre home:  · Drink plenty of fluids.  · You may have burning or light bleeding when you urinate--this is normal.  · Medicines may be prescribed to ease any discomfort or prevent infection. Take these as directed.  · Call your doctor if you have heavy bleeding or blood clots, burning that lasts more than a day, a fever over 100°F  (38° C), or trouble urinating.  Date Last Reviewed: 1/1/2017  © 1548-4043 [employer]. [address] [place] [address], [place] [address]. All rights reserved. This information is not intended as a substitute for professional medical care. Always follow your healthcare professional's instructions.      Continue IV abx per Infectious Disease recommendations.

## 2020-03-05 ENCOUNTER — PATIENT MESSAGE (OUTPATIENT)
Dept: UROLOGY | Facility: CLINIC | Age: 70
End: 2020-03-05

## 2020-03-05 ENCOUNTER — TELEPHONE (OUTPATIENT)
Dept: UROLOGY | Facility: CLINIC | Age: 70
End: 2020-03-05

## 2020-03-05 NOTE — ANESTHESIA POSTPROCEDURE EVALUATION
Anesthesia Post Evaluation    Patient: Antonio Gross    Procedure(s) Performed: Procedure(s) (LRB):  URETEROSCOPY (Right)  CYSTOSCOPY, WITH URETERAL STENT REMOVAL  CYSTOURETEROSCOPY, WITH HOLMIUM LASER LITHOTRIPSY OF URETERAL CALCULUS AND STENT INSERTION (Right)    Final Anesthesia Type: general    Patient location during evaluation: PACU  Patient participation: Yes- Able to Participate  Level of consciousness: awake and alert and oriented  Post-procedure vital signs: reviewed and stable  Pain management: adequate  Airway patency: patent    PONV status at discharge: No PONV  Anesthetic complications: no      Cardiovascular status: blood pressure returned to baseline and hemodynamically stable  Respiratory status: unassisted  Hydration status: euvolemic  Follow-up not needed.          Vitals Value Taken Time   /68 3/4/2020  3:46 PM   Temp 37.1 °C (98.7 °F) 3/4/2020  2:14 PM   Pulse 44 3/4/2020  3:51 PM   Resp 19 3/4/2020  2:14 PM   SpO2 98 % 3/4/2020  3:51 PM   Vitals shown include unvalidated device data.      No case tracking events are documented in the log.      Pain/Zabrina Score: Zabrina Score: 10 (3/4/2020  3:49 PM)

## 2020-03-05 NOTE — TELEPHONE ENCOUNTER
----- Message from Rhonda Ocampo RN sent at 3/4/2020  7:31 PM CST -----      ----- Message -----  From: Marlena Comer MD  Sent: 3/4/2020   2:08 PM CST  To: Silv[facility] Staff    Please set up in 3 months with renal US and KUB with Dr. Anguiano.    Thanks,  Marlena

## 2020-03-06 ENCOUNTER — PATIENT MESSAGE (OUTPATIENT)
Dept: INFECTIOUS DISEASES | Facility: CLINIC | Age: 70
End: 2020-03-06

## 2020-03-07 LAB
COMPN STONE: NORMAL
SPECIMEN SOURCE: NORMAL
STONE ANALYSIS IR-IMP: NORMAL

## 2020-03-10 ENCOUNTER — LAB VISIT (OUTPATIENT)
Dept: LAB | Facility: OTHER | Age: 70
End: 2020-03-10
Attending: UROLOGY
Payer: MEDICARE

## 2020-03-10 DIAGNOSIS — N10 ACUTE PYELONEPHRITIS WITH LESION OF RENAL MEDULLARY NECROSIS: Primary | ICD-10-CM

## 2020-03-10 DIAGNOSIS — N17.2 ACUTE PYELONEPHRITIS WITH LESION OF RENAL MEDULLARY NECROSIS: Primary | ICD-10-CM

## 2020-03-10 LAB
ALBUMIN SERPL BCP-MCNC: 3.1 G/DL (ref 3.5–5.2)
ALP SERPL-CCNC: 85 U/L (ref 55–135)
ALT SERPL W/O P-5'-P-CCNC: 13 U/L (ref 10–44)
ANION GAP SERPL CALC-SCNC: 9 MMOL/L (ref 8–16)
AST SERPL-CCNC: 47 U/L (ref 10–40)
BASOPHILS # BLD AUTO: 0.01 K/UL (ref 0–0.2)
BASOPHILS NFR BLD: 0.2 % (ref 0–1.9)
BILIRUB SERPL-MCNC: 0.7 MG/DL (ref 0.1–1)
BUN SERPL-MCNC: 7 MG/DL (ref 8–23)
CALCIUM SERPL-MCNC: 9.4 MG/DL (ref 8.7–10.5)
CHLORIDE SERPL-SCNC: 104 MMOL/L (ref 95–110)
CO2 SERPL-SCNC: 27 MMOL/L (ref 23–29)
CREAT SERPL-MCNC: 0.8 MG/DL (ref 0.5–1.4)
DIFFERENTIAL METHOD: ABNORMAL
EOSINOPHIL # BLD AUTO: 0.3 K/UL (ref 0–0.5)
EOSINOPHIL NFR BLD: 6.7 % (ref 0–8)
ERYTHROCYTE [DISTWIDTH] IN BLOOD BY AUTOMATED COUNT: 13.2 % (ref 11.5–14.5)
EST. GFR  (AFRICAN AMERICAN): >60 ML/MIN/1.73 M^2
EST. GFR  (NON AFRICAN AMERICAN): >60 ML/MIN/1.73 M^2
GLUCOSE SERPL-MCNC: 97 MG/DL (ref 70–110)
HCT VFR BLD AUTO: 34.3 % (ref 40–54)
HGB BLD-MCNC: 10.7 G/DL (ref 14–18)
IMM GRANULOCYTES # BLD AUTO: 0.01 K/UL (ref 0–0.04)
IMM GRANULOCYTES NFR BLD AUTO: 0.2 % (ref 0–0.5)
LYMPHOCYTES # BLD AUTO: 1.2 K/UL (ref 1–4.8)
LYMPHOCYTES NFR BLD: 28.8 % (ref 18–48)
MCH RBC QN AUTO: 32 PG (ref 27–31)
MCHC RBC AUTO-ENTMCNC: 31.2 G/DL (ref 32–36)
MCV RBC AUTO: 103 FL (ref 82–98)
MONOCYTES # BLD AUTO: 0.4 K/UL (ref 0.3–1)
MONOCYTES NFR BLD: 9.7 % (ref 4–15)
NEUTROPHILS # BLD AUTO: 2.2 K/UL (ref 1.8–7.7)
NEUTROPHILS NFR BLD: 54.4 % (ref 38–73)
NRBC BLD-RTO: 0 /100 WBC
PLATELET # BLD AUTO: 152 K/UL (ref 150–350)
PMV BLD AUTO: 10.8 FL (ref 9.2–12.9)
POTASSIUM SERPL-SCNC: 3.1 MMOL/L (ref 3.5–5.1)
PROT SERPL-MCNC: 7.3 G/DL (ref 6–8.4)
RBC # BLD AUTO: 3.34 M/UL (ref 4.6–6.2)
SODIUM SERPL-SCNC: 140 MMOL/L (ref 136–145)
WBC # BLD AUTO: 4.03 K/UL (ref 3.9–12.7)

## 2020-03-10 PROCEDURE — 80053 COMPREHEN METABOLIC PANEL: CPT

## 2020-03-10 PROCEDURE — 36415 COLL VENOUS BLD VENIPUNCTURE: CPT

## 2020-03-10 PROCEDURE — 85025 COMPLETE CBC W/AUTO DIFF WBC: CPT

## 2020-03-12 ENCOUNTER — PATIENT MESSAGE (OUTPATIENT)
Dept: INFECTIOUS DISEASES | Facility: CLINIC | Age: 70
End: 2020-03-12

## 2020-03-12 ENCOUNTER — TELEPHONE (OUTPATIENT)
Dept: INFECTIOUS DISEASES | Facility: CLINIC | Age: 70
End: 2020-03-12

## 2020-03-12 DIAGNOSIS — A49.01 MSSA (METHICILLIN SUSCEPTIBLE STAPHYLOCOCCUS AUREUS) INFECTION: Primary | ICD-10-CM

## 2020-03-12 NOTE — TELEPHONE ENCOUNTER
Spoke to pt's wife Tracy, she would like to know if its necessary for him to come in to his appointment today. Mrs. Molina states she would like to keep her  at home, would exposing him to any virus. They would like to do a the consult through the phone.

## 2020-03-12 NOTE — TELEPHONE ENCOUNTER
"----- Message from Yoli Burger sent at 3/12/2020  9:23 AM CDT -----  Contact: Del   Name of caller: spouse   Provider name: Hanny Ferrell   Contact Preference:  [phone]  Is this regarding current patient or new patient?: current   What is the nature of the call?    - pt calling with questions about if the pt should come in  for his next appts. Please call and advise his wife. Per her request     Additional Notes:   "Thank you for all that you do for our patients'"     "

## 2020-03-17 ENCOUNTER — LAB VISIT (OUTPATIENT)
Dept: LAB | Facility: OTHER | Age: 70
End: 2020-03-17
Attending: INTERNAL MEDICINE
Payer: MEDICARE

## 2020-03-17 DIAGNOSIS — N10 ACUTE PYELONEPHRITIS WITH LESION OF RENAL MEDULLARY NECROSIS: Primary | ICD-10-CM

## 2020-03-17 DIAGNOSIS — N17.2 ACUTE PYELONEPHRITIS WITH LESION OF RENAL MEDULLARY NECROSIS: Primary | ICD-10-CM

## 2020-03-17 LAB
ALBUMIN SERPL BCP-MCNC: 3.3 G/DL (ref 3.5–5.2)
ALP SERPL-CCNC: 76 U/L (ref 55–135)
ALT SERPL W/O P-5'-P-CCNC: 12 U/L (ref 10–44)
ANION GAP SERPL CALC-SCNC: 9 MMOL/L (ref 8–16)
AST SERPL-CCNC: 51 U/L (ref 10–40)
BASOPHILS # BLD AUTO: 0.02 K/UL (ref 0–0.2)
BASOPHILS NFR BLD: 0.5 % (ref 0–1.9)
BILIRUB SERPL-MCNC: 0.8 MG/DL (ref 0.1–1)
BUN SERPL-MCNC: 9 MG/DL (ref 8–23)
CALCIUM SERPL-MCNC: 9.4 MG/DL (ref 8.7–10.5)
CHLORIDE SERPL-SCNC: 103 MMOL/L (ref 95–110)
CO2 SERPL-SCNC: 27 MMOL/L (ref 23–29)
CREAT SERPL-MCNC: 0.8 MG/DL (ref 0.5–1.4)
DIFFERENTIAL METHOD: ABNORMAL
EOSINOPHIL # BLD AUTO: 0.3 K/UL (ref 0–0.5)
EOSINOPHIL NFR BLD: 7.5 % (ref 0–8)
ERYTHROCYTE [DISTWIDTH] IN BLOOD BY AUTOMATED COUNT: 13.2 % (ref 11.5–14.5)
EST. GFR  (AFRICAN AMERICAN): >60 ML/MIN/1.73 M^2
EST. GFR  (NON AFRICAN AMERICAN): >60 ML/MIN/1.73 M^2
GLUCOSE SERPL-MCNC: 92 MG/DL (ref 70–110)
HCT VFR BLD AUTO: 34 % (ref 40–54)
HGB BLD-MCNC: 11 G/DL (ref 14–18)
IMM GRANULOCYTES # BLD AUTO: 0.01 K/UL (ref 0–0.04)
IMM GRANULOCYTES NFR BLD AUTO: 0.2 % (ref 0–0.5)
LYMPHOCYTES # BLD AUTO: 1.2 K/UL (ref 1–4.8)
LYMPHOCYTES NFR BLD: 28.6 % (ref 18–48)
MCH RBC QN AUTO: 32.8 PG (ref 27–31)
MCHC RBC AUTO-ENTMCNC: 32.4 G/DL (ref 32–36)
MCV RBC AUTO: 102 FL (ref 82–98)
MONOCYTES # BLD AUTO: 0.3 K/UL (ref 0.3–1)
MONOCYTES NFR BLD: 8 % (ref 4–15)
NEUTROPHILS # BLD AUTO: 2.3 K/UL (ref 1.8–7.7)
NEUTROPHILS NFR BLD: 55.2 % (ref 38–73)
NRBC BLD-RTO: 0 /100 WBC
PLATELET # BLD AUTO: 140 K/UL (ref 150–350)
PMV BLD AUTO: 10.5 FL (ref 9.2–12.9)
POTASSIUM SERPL-SCNC: 3.7 MMOL/L (ref 3.5–5.1)
PROT SERPL-MCNC: 7.4 G/DL (ref 6–8.4)
RBC # BLD AUTO: 3.35 M/UL (ref 4.6–6.2)
SODIUM SERPL-SCNC: 139 MMOL/L (ref 136–145)
WBC # BLD AUTO: 4.12 K/UL (ref 3.9–12.7)

## 2020-03-17 PROCEDURE — 80053 COMPREHEN METABOLIC PANEL: CPT

## 2020-03-17 PROCEDURE — 36415 COLL VENOUS BLD VENIPUNCTURE: CPT

## 2020-03-17 PROCEDURE — 85025 COMPLETE CBC W/AUTO DIFF WBC: CPT

## 2020-03-19 NOTE — TELEPHONE ENCOUNTER
Called pt to inform he about stopping the IV ABX., and removing the line  Called [facility] with verbal orders

## 2020-03-20 ENCOUNTER — TELEPHONE (OUTPATIENT)
Dept: INFECTIOUS DISEASES | Facility: CLINIC | Age: 70
End: 2020-03-20

## 2020-03-20 NOTE — TELEPHONE ENCOUNTER
----- Message from Julieta Pattie sent at 3/20/2020  9:02 AM CDT -----  Contact: Kim zee/ [facility]    tel:    [phone]   Caller says she needs to find out asap if IV antibiotic  is discontinued.     Pt. Says he is still to get another dose today.    Needs clarification on this.   Dose is due at 10:30am.       Caller says the pt. Called and texted  Her several times last night questioning this.      Pls call asap.

## 2020-03-24 ENCOUNTER — PATIENT MESSAGE (OUTPATIENT)
Dept: INFECTIOUS DISEASES | Facility: CLINIC | Age: 70
End: 2020-03-24

## 2020-04-24 ENCOUNTER — EXTERNAL HOME HEALTH (OUTPATIENT)
Dept: HOME HEALTH SERVICES | Facility: HOSPITAL | Age: 70
End: 2020-04-24
Payer: MEDICARE

## 2020-05-25 ENCOUNTER — EXTERNAL HOME HEALTH (OUTPATIENT)
Dept: HOME HEALTH SERVICES | Facility: HOSPITAL | Age: 70
End: 2020-05-25
Payer: MEDICARE

## 2021-01-01 NOTE — PLAN OF CARE
Pt remains free from falls and injury. Pt makes statement of no pain. Pt requested no interruptions till 7AM. Bed low and locked, Call light within reach.    13.77

## 2021-04-28 ENCOUNTER — PATIENT MESSAGE (OUTPATIENT)
Dept: RESEARCH | Facility: HOSPITAL | Age: 71
End: 2021-04-28

## 2023-04-11 NOTE — NURSING TRANSFER
Nursing Transfer Note      2/15/2020     Transfer To: [id number]    Transfer via stretcher    Transfer with 2liters to O2, cardiac monitoring    Transported by escort with ticket to ride    Medicines sent: iv fluids infusing    Chart send with patient: Yes    Notified: spouse at bedside    Patient reassessed at: (date, time)    Upon arrival to floor: to room no complaints no distress noted. Also called telemetry to make sure they could see patient on monitor. No distress no complaints noted.  
No

## (undated) DEVICE — PACK CYSTO

## (undated) DEVICE — SYR 10CC LUER LOCK

## (undated) DEVICE — CATH 5FR OPEN END URETERAL

## (undated) DEVICE — GLOVE BIOGEL ECLIPSE SZ 7.5

## (undated) DEVICE — SOL IRR NACL .9% 3000ML

## (undated) DEVICE — TRAY CYSTO BASIN

## (undated) DEVICE — GOWN X-LG STERILE BACK

## (undated) DEVICE — GUIDE WIRE MOTION .035 X 150CM

## (undated) DEVICE — GOWN SMARTGOWN LVL4 X-LONG XL

## (undated) DEVICE — SOL IRR WATER STRL 3000 ML

## (undated) DEVICE — FIBER MOSES 200 DFL

## (undated) DEVICE — WIRE GD LUB STD 3CM .038 150CM

## (undated) DEVICE — SET CYSTO IRRIGATION UNIV SPIK

## (undated) DEVICE — GUIDEWIRE STF .035X150CM ANG

## (undated) DEVICE — EXTRACTOR TIPLESS 2.4FRX1115CM

## (undated) DEVICE — GLOVE BIOGEL 7.5

## (undated) DEVICE — SOL WATER STRL IRR 1000ML

## (undated) DEVICE — WIRE GD LUB ANG 3CM .038 150CM